# Patient Record
Sex: FEMALE | Race: WHITE | Employment: FULL TIME | URBAN - METROPOLITAN AREA
[De-identification: names, ages, dates, MRNs, and addresses within clinical notes are randomized per-mention and may not be internally consistent; named-entity substitution may affect disease eponyms.]

---

## 2017-03-08 ENCOUNTER — OFFICE VISIT (OUTPATIENT)
Dept: INTERNAL MEDICINE CLINIC | Age: 32
End: 2017-03-08

## 2017-03-08 VITALS
TEMPERATURE: 98.5 F | HEIGHT: 64 IN | DIASTOLIC BLOOD PRESSURE: 78 MMHG | BODY MASS INDEX: 50.02 KG/M2 | SYSTOLIC BLOOD PRESSURE: 124 MMHG | HEART RATE: 80 BPM | WEIGHT: 293 LBS

## 2017-03-08 DIAGNOSIS — Z01.419 ENCOUNTER FOR WELL WOMAN EXAM WITH ROUTINE GYNECOLOGICAL EXAM: ICD-10-CM

## 2017-03-08 DIAGNOSIS — I10 ESSENTIAL HYPERTENSION: Primary | ICD-10-CM

## 2017-03-08 LAB
ANION GAP SERPL CALCULATED.3IONS-SCNC: 18 MMOL/L (ref 3–16)
BUN BLDV-MCNC: 13 MG/DL (ref 7–20)
CALCIUM SERPL-MCNC: 9 MG/DL (ref 8.3–10.6)
CHLORIDE BLD-SCNC: 101 MMOL/L (ref 99–110)
CO2: 20 MMOL/L (ref 21–32)
CREAT SERPL-MCNC: 0.8 MG/DL (ref 0.6–1.1)
GFR AFRICAN AMERICAN: >60
GFR NON-AFRICAN AMERICAN: >60
GLUCOSE BLD-MCNC: 108 MG/DL (ref 70–99)
POTASSIUM SERPL-SCNC: 4.5 MMOL/L (ref 3.5–5.1)
SODIUM BLD-SCNC: 139 MMOL/L (ref 136–145)

## 2017-03-08 PROCEDURE — 99213 OFFICE O/P EST LOW 20 MIN: CPT | Performed by: NURSE PRACTITIONER

## 2017-05-25 RX ORDER — ERGOCALCIFEROL 1.25 MG/1
CAPSULE ORAL
Qty: 12 CAPSULE | Refills: 1 | Status: SHIPPED | OUTPATIENT
Start: 2017-05-25 | End: 2017-11-20 | Stop reason: SDUPTHER

## 2017-05-30 ENCOUNTER — OFFICE VISIT (OUTPATIENT)
Dept: INTERNAL MEDICINE CLINIC | Age: 32
End: 2017-05-30

## 2017-05-30 VITALS
HEART RATE: 91 BPM | WEIGHT: 293 LBS | OXYGEN SATURATION: 97 % | DIASTOLIC BLOOD PRESSURE: 88 MMHG | BODY MASS INDEX: 51.94 KG/M2 | TEMPERATURE: 99.1 F | SYSTOLIC BLOOD PRESSURE: 134 MMHG

## 2017-05-30 DIAGNOSIS — J20.9 ACUTE BRONCHITIS, UNSPECIFIED ORGANISM: Primary | ICD-10-CM

## 2017-05-30 PROCEDURE — 99213 OFFICE O/P EST LOW 20 MIN: CPT | Performed by: NURSE PRACTITIONER

## 2017-05-30 RX ORDER — BENZONATATE 200 MG/1
200 CAPSULE ORAL 3 TIMES DAILY PRN
Qty: 21 CAPSULE | Refills: 0 | Status: SHIPPED | OUTPATIENT
Start: 2017-05-30 | End: 2017-06-06

## 2017-05-30 RX ORDER — AZITHROMYCIN 250 MG/1
TABLET, FILM COATED ORAL
Qty: 6 TABLET | Refills: 0 | Status: SHIPPED | OUTPATIENT
Start: 2017-05-30 | End: 2017-09-12 | Stop reason: ALTCHOICE

## 2017-05-30 ASSESSMENT — ENCOUNTER SYMPTOMS
COUGH: 1
SORE THROAT: 1

## 2017-06-12 ENCOUNTER — OFFICE VISIT (OUTPATIENT)
Dept: GYNECOLOGY | Age: 32
End: 2017-06-12

## 2017-06-12 VITALS
WEIGHT: 293 LBS | HEART RATE: 73 BPM | DIASTOLIC BLOOD PRESSURE: 83 MMHG | BODY MASS INDEX: 50.02 KG/M2 | RESPIRATION RATE: 17 BRPM | HEIGHT: 64 IN | SYSTOLIC BLOOD PRESSURE: 138 MMHG

## 2017-06-12 DIAGNOSIS — N92.6 IRREGULAR MENSES: ICD-10-CM

## 2017-06-12 DIAGNOSIS — Z01.419 WELL WOMAN EXAM WITH ROUTINE GYNECOLOGICAL EXAM: Primary | ICD-10-CM

## 2017-06-12 PROCEDURE — 99385 PREV VISIT NEW AGE 18-39: CPT | Performed by: OBSTETRICS & GYNECOLOGY

## 2017-06-14 LAB
HPV COMMENT: NORMAL
HPV TYPE 16: NOT DETECTED
HPV TYPE 18: NOT DETECTED
HPVOH (OTHER TYPES): NOT DETECTED

## 2017-06-14 ASSESSMENT — ENCOUNTER SYMPTOMS
RESPIRATORY NEGATIVE: 1
GASTROINTESTINAL NEGATIVE: 1
EYES NEGATIVE: 1

## 2017-07-14 DIAGNOSIS — I10 ESSENTIAL HYPERTENSION: ICD-10-CM

## 2017-07-14 RX ORDER — CHLORTHALIDONE 25 MG/1
TABLET ORAL
Qty: 30 TABLET | Refills: 2 | Status: SHIPPED | OUTPATIENT
Start: 2017-07-14 | End: 2017-10-26 | Stop reason: SDUPTHER

## 2017-07-14 RX ORDER — LISINOPRIL 40 MG/1
TABLET ORAL
Qty: 30 TABLET | Refills: 2 | Status: SHIPPED | OUTPATIENT
Start: 2017-07-14 | End: 2017-10-26 | Stop reason: SDUPTHER

## 2017-09-12 ENCOUNTER — OFFICE VISIT (OUTPATIENT)
Dept: INTERNAL MEDICINE CLINIC | Age: 32
End: 2017-09-12

## 2017-09-12 VITALS
TEMPERATURE: 98.9 F | SYSTOLIC BLOOD PRESSURE: 130 MMHG | WEIGHT: 293 LBS | BODY MASS INDEX: 52.87 KG/M2 | HEART RATE: 97 BPM | OXYGEN SATURATION: 97 % | DIASTOLIC BLOOD PRESSURE: 80 MMHG

## 2017-09-12 DIAGNOSIS — I10 ESSENTIAL HYPERTENSION: Primary | ICD-10-CM

## 2017-09-12 LAB
ANION GAP SERPL CALCULATED.3IONS-SCNC: 19 MMOL/L (ref 3–16)
BUN BLDV-MCNC: 18 MG/DL (ref 7–20)
CALCIUM SERPL-MCNC: 8.8 MG/DL (ref 8.3–10.6)
CHLORIDE BLD-SCNC: 96 MMOL/L (ref 99–110)
CO2: 19 MMOL/L (ref 21–32)
CREAT SERPL-MCNC: 0.7 MG/DL (ref 0.6–1.1)
GFR AFRICAN AMERICAN: >60
GFR NON-AFRICAN AMERICAN: >60
GLUCOSE BLD-MCNC: 107 MG/DL (ref 70–99)
POTASSIUM SERPL-SCNC: 4.4 MMOL/L (ref 3.5–5.1)
SODIUM BLD-SCNC: 134 MMOL/L (ref 136–145)

## 2017-09-12 PROCEDURE — 99213 OFFICE O/P EST LOW 20 MIN: CPT | Performed by: NURSE PRACTITIONER

## 2017-10-12 DIAGNOSIS — E87.1 HYPONATREMIA: Primary | ICD-10-CM

## 2017-10-12 LAB
ANION GAP SERPL CALCULATED.3IONS-SCNC: 17 MMOL/L (ref 3–16)
BUN BLDV-MCNC: 16 MG/DL (ref 7–20)
CALCIUM SERPL-MCNC: 8.9 MG/DL (ref 8.3–10.6)
CHLORIDE BLD-SCNC: 97 MMOL/L (ref 99–110)
CO2: 23 MMOL/L (ref 21–32)
CREAT SERPL-MCNC: 0.8 MG/DL (ref 0.6–1.1)
GFR AFRICAN AMERICAN: >60
GFR NON-AFRICAN AMERICAN: >60
GLUCOSE BLD-MCNC: 95 MG/DL (ref 70–99)
POTASSIUM SERPL-SCNC: 4.2 MMOL/L (ref 3.5–5.1)
SODIUM BLD-SCNC: 137 MMOL/L (ref 136–145)

## 2017-10-26 DIAGNOSIS — I10 ESSENTIAL HYPERTENSION: ICD-10-CM

## 2017-10-26 RX ORDER — CHLORTHALIDONE 25 MG/1
TABLET ORAL
Qty: 30 TABLET | Refills: 1 | Status: SHIPPED | OUTPATIENT
Start: 2017-10-26 | End: 2018-01-06 | Stop reason: SDUPTHER

## 2017-10-26 RX ORDER — LISINOPRIL 40 MG/1
TABLET ORAL
Qty: 30 TABLET | Refills: 1 | Status: SHIPPED | OUTPATIENT
Start: 2017-10-26 | End: 2018-01-06 | Stop reason: SDUPTHER

## 2017-11-21 RX ORDER — ERGOCALCIFEROL 1.25 MG/1
CAPSULE ORAL
Qty: 12 CAPSULE | Refills: 0 | Status: SHIPPED | OUTPATIENT
Start: 2017-11-21 | End: 2018-03-07 | Stop reason: SDUPTHER

## 2017-12-15 ENCOUNTER — OFFICE VISIT (OUTPATIENT)
Dept: ORTHOPEDIC SURGERY | Age: 32
End: 2017-12-15

## 2017-12-15 VITALS
DIASTOLIC BLOOD PRESSURE: 81 MMHG | HEIGHT: 64 IN | BODY MASS INDEX: 50.02 KG/M2 | SYSTOLIC BLOOD PRESSURE: 139 MMHG | WEIGHT: 293 LBS | HEART RATE: 87 BPM

## 2017-12-15 DIAGNOSIS — M25.562 LEFT KNEE PAIN, UNSPECIFIED CHRONICITY: Primary | ICD-10-CM

## 2017-12-15 PROCEDURE — 99214 OFFICE O/P EST MOD 30 MIN: CPT | Performed by: ORTHOPAEDIC SURGERY

## 2017-12-15 PROCEDURE — 73562 X-RAY EXAM OF KNEE 3: CPT | Performed by: ORTHOPAEDIC SURGERY

## 2017-12-15 PROCEDURE — 20610 DRAIN/INJ JOINT/BURSA W/O US: CPT | Performed by: ORTHOPAEDIC SURGERY

## 2017-12-15 NOTE — PROGRESS NOTES
[facility]  History and Physical  Knee Pain    Date:  12/15/2017    Name:  Miladis Vickers  Address:  [facility] [address]    :  1985      Age:   27 y.o.    SSN:  [id number]      Medical Record Number:  [id number]    Chief Complaint:  Knee Pain (new patient, left knee pain for about 2 weeks, reports she did start working out more 3 days a week, states she did not work out this week and pain is not better)      HPI:   Miladis Vickers is a healthy and well appearing 27y.o. year old female who presents to our office today complaining of  left knee pain. She is a previous patient of Dr. Nalini Ward, who performed bilateral patellar realignment surgeries on her greater than 1 decade ago. Her right knee is doing excellent. She did have screw removal on this knee several years ago. She did have some left knee symptoms in  that they chanted to treat with physical therapy. She had a cortisone injection in the past that she thinks got her about a month of pain relief. She also had the viscous supplementation injection that she does not note any relief from. Her symptoms are worse when she descends stairs. Pain Assessment  Location of Pain: Knee  Location Modifiers: Left  Severity of Pain: 7  Quality of Pain: Sharp, Throbbing  Duration of Pain: Persistent  Frequency of Pain: Constant  Date Pain First Started:  (increased work out)  Aggravating Factors: Stairs  Limiting Behavior: Yes  Relieving Factors: Ice, Nsaids (ibuprofen)  Result of Injury: Yes  Work-Related Injury: No  Are there other pain locations you wish to document?: No    Review of Systems:  A 14 point review of systems available in the scanned medical record, under the media tab, as documented by the patient. The review is negative with the exception of those things mentioned in the HPI and Past Medical History. Past History:  History reviewed.  No pertinent past medical intact  Mouth: Oral mucosa moist. No perioral lesions  Pulm: Respirations unlabored and regular. Heart: Regular rate and rhythm   Skin: warm, well perfused    Left Knee Exam:   Overall alignment is appreciated. Within normal limits. On standing she has mild worsening of her valgus     Gait/Alignment: No use of assistive devices.       Patella tracking: No J sign. She still has a mild valgus deformity with increased Q angle.     Inspection/Skin: Skin is intact without erythema or ecchymosis. No significant swelling. No deformity.      Effusion; none.      Palpation: She is tender about the lateral facet of patella. She has significant patellofemoral crepitus with pain exacerbated by she is tender on the lateral facet of her patella. She has significant crepitance that reproduces pain with compression to her arc of range of motion.     Range of Motion: From 0-110° limited by her body habitus degrees of flexion without pain. Internal and external rotation of the hip are maintained without pain or deficit.      Strength: 5/5 strength of the quadriceps and hamstrings.      Ligamentous Stability: Stable to valgus and varus stress testing at 0° and 30°. Marj's does not reproduce pain. Lachman's has a solid endpoint.     Neurologic and vascular: Intact sensation to light touch in all 5 digits. 2+ palpable pedal pulses.           Comparison right Knee Exam:   Overall alignment is appreciated. Within normal limits.      Gait/Alignment: No use of assistive devices.       Patella tracking: No J sign.      Inspection/Skin: Skin is intact without erythema or ecchymosis. No significant swelling. No deformity.      Effusion; none.      Palpation: Non-tender to the medial or lateral joint line. No fullness in the popliteal fossa. No pain with patellar grind testing. Non-tender to the medial or lateral patellar facets. Non-tender to medial and lateral collateral ligaments. No significant Patellofemoral crepitus.  Calf compartments are soft and non-tender. No signs of DVT.      Range of Motion: From 0-110° limited by her body habitus, flexion without pain. Internal and external rotation of the hip are maintained without pain or deficit.      Strength: 5/5 strength of the quadriceps and hamstrings.      Ligamentous Stability: Stable to valgus and varus stress testing at 0° and 30°. Marj's does not reproduce pain. Lachman's has a solid endpoint.     Neurologic and vascular: Intact sensation to light touch in all 5 digits. 2+ palpable pedal pulses. Laboratory:  No visits with results within 14 Day(s) from this visit. Latest known visit with results is:   Orders Only on 10/12/2017   Component Date Value    Sodium 10/12/2017 137     Potassium 10/12/2017 4.2     Chloride 10/12/2017 97*    CO2 10/12/2017 23     Anion Gap 10/12/2017 17*    Glucose 10/12/2017 95     BUN 10/12/2017 16     CREATININE 10/12/2017 0.8     GFR Non- 10/12/2017 >60     GFR  10/12/2017 >60     Calcium 10/12/2017 8.9       No results found for this or any previous visit (from the past 24 hour(s)). Radiographic:  3 xray of the left  knee taken in our office today reveal no fractures, dislocations, visible tumors, or signs of acute trauma. She has previous evidence of patellar realignment. Her merchant view shows significant maltracking with near complete wear of the lateral patellar facet and eccentric riding in the trochlea. She also has some narrowing in her medial compartment. Assessment:  Patellofemoral and medial compartment arthritis    Plan:   We discussed the pathophysiology, as well as the natural history of this type of disease process with the patient. We reviewed the imaging as well as the pertinent anatomy with handouts. We liked her to begin a more regimented program of anti-inflammatories as well as physical therapy today.   She she was also explained the risks and benefits of cortisone the left knee was prepped with Chlora-prep. The skin and subcutaneous tissues were anesthetized with ethyl chloride spray and a 6 mL solution of 4 mL ropivacaine and  2 ml of 40mg/ml DepoMedrol was injected. The needle was withdrawn and the puncture site sealed with a Band-Aid. The patient tolerated the procedure well. Elicia Massey MD  Board Certified Orthopaedic Surgeon  Fellow, Sports Medicine and Arthroscopic [facility]  Date:    12/15/2017  Attestation:  I was physically present and performed my own examination of this patient and have discussed the case, including pertinent history and exam findings with the fellow. I agree with the documented assessment and plan. Gregory Mcknight.  Miguelangel Siddiqi MD

## 2017-12-15 NOTE — PROGRESS NOTES
Depo medrol NDC [id number]  Lot# [id number]  Exp.4/2020   Knee, left    Ropivacaine    [facility]#:  [id number]  LOT#:  [id number]  Exp Date:  05/2021  Knee, left

## 2017-12-29 ENCOUNTER — HOSPITAL ENCOUNTER (OUTPATIENT)
Dept: PHYSICAL THERAPY | Age: 32
Discharge: HOME | End: 2017-12-31
Admitting: ORTHOPAEDIC SURGERY

## 2017-12-29 NOTE — PLAN OF CARE
two years ago, she started having high amount of pain in her left knee that gradually went away over time. About 3-4 months ago, started working out more consistently again and pain came back aggressively. The pain does not prevent her from doing anything, she just has pain after working out that got to the point that she came in to see Dr. Bouchra Virgen. She reports that she started doing crossfit on average 3x/week. Denies that any certain exercise that caused pain during workouts. Has had some relief in recent weeks after receiving cortisone injection in her knee. (+) clicking/popping/crepitus  (-) burning pain  (-) numbness and tingling  (-) giving way  (-) locking up  (-) spasms  (+) night pain: had to take 3-4 ibuprofen per night to fall asleep a few weeks ago, but no longer since cortisone injection  (+) pain with stairs ascending/descending (++)  (>20min) minutes walking/standing  (-) dislocation/subluxation  (-) pivoting symptoms  (-) Instability  (+) difficulty sleeping  (-) Falls in last year  (-) Difficulty with balance    Imaging: 3 xray of the left  knee taken in our office today reveal no fractures, dislocations, visible tumors, or signs of acute trauma. She has previous evidence of patellar realignment. Her merchant view shows significant maltracking with near complete wear of the lateral patellar facet and eccentric riding in the trochlea. She also has some narrowing in her medial compartment. Relevant Medical History: Bilateral patellar realignment surgeries + hardware removal (R Only)   Functional Disability Index:PT G-Codes  Functional Assessment Tool Used: LEFS  Score: 25% Deficit  Functional Limitation: Mobility: Walking and moving around  Mobility: Walking and Moving Around Current Status (): At least 20 percent but less than 40 percent impaired, limited or restricted  Mobility: Walking and Moving Around Goal Status ():  At least 1 percent but less than 20 percent impaired, n/a    Gait: (include devices/WB status) Goes into hyperextension during stance phase bilaterally; mild trendelenburg bilaterally; calcaneal eversion during mid-stance    Orthopedic Special Tests: see above                       [x] Patient history, allergies, meds reviewed. Medical chart reviewed. See intake form. Review Of Systems (ROS):  [x]Performed Review of systems (Integumentary, CardioPulmonary, Neurological) by intake and observation. Intake form has been scanned into medical record. Patient has been instructed to contact their primary care physician regarding ROS issues if not already being addressed at this time.       Co-morbidities/Complexities (which will affect course of rehabilitation):   []None           Arthritic conditions   []Rheumatoid arthritis (M05.9)  [x]Osteoarthritis (M19.91)   Cardiovascular conditions   [x]Hypertension (I10)  []Hyperlipidemia (E78.5)  []Angina pectoris (I20)  []Atherosclerosis (I70)   Musculoskeletal conditions   []Disc pathology   []Congenital spine pathologies   [x]Prior surgical intervention  []Osteoporosis (M81.8)  []Osteopenia (M85.8)   Endocrine conditions   []Hypothyroid (E03.9)  []Hyperthyroid Gastrointestinal conditions   []Constipation (F33.91)   Metabolic conditions   [x]Morbid obesity (E66.01)  []Diabetes type 1(E10.65) or 2 (E11.65)   []Neuropathy (G60.9)     Pulmonary conditions   []Asthma (J45)  []Coughing   []COPD (J44.9)   Psychological Disorders  []Anxiety (F41.9)  []Depression (F32.9)   []Other:   []Other:          Barriers to/and or personal factors that will affect rehab potential:              []Age  []Sex              []Motivation/Lack of Motivation                        [x]Co-Morbidities              []Cognitive Function, education/learning barriers              []Environmental, home barriers              []profession/work barriers  [x]past PT/medical experience  [x]other:  Justification: Patient has high body weight with underlying cartilage damage to patella which may limit symptom relief without adequate activity modification    Falls Risk Assessment (30 days):   [x] Falls Risk assessed and no intervention required. [] Falls Risk assessed and Patient requires intervention due to being higher risk   TUG score (>12s at risk):     [] Falls education provided, including       G-Codes:  PT G-Codes  Functional Assessment Tool Used: LEFS  Score: 25% Deficit  Functional Limitation: Mobility: Walking and moving around  Mobility: Walking and Moving Around Current Status (): At least 20 percent but less than 40 percent impaired, limited or restricted  Mobility: Walking and Moving Around Goal Status ():  At least 1 percent but less than 20 percent impaired, limited or restricted    ASSESSMENT:   Functional Impairments:     []Noted lumbar/proximal hip/LE hypomobility   []Decreased LE functional ROM   [x]Decreased core/proximal hip strength and neuromuscular control   [x]Decreased LE functional strength   [x]Reduced balance/proprioceptive control   []other:      Functional Activity Limitations (from functional questionnaire and intake)   []Reduced ability to tolerate prolonged functional positions   [x]Reduced ability or difficulty with changes of positions or transfers between positions   [x]Reduced ability to maintain good posture and demonstrate good body mechanics with sitting, bending, and lifting   [x]Reduced ability to sleep   [] Reduced ability or tolerance with driving and/or computer work   [x]Reduced ability to perform lifting, carrying tasks   [x]Reduced ability to squat   []Reduced ability to forward bend   [x]Reduced ability to ambulate prolonged functional periods/distances/surfaces   [x]Reduced ability to ascend/descend stairs   [x]Reduced ability to run, hop or jump   []other:     Participation Restrictions   []Reduced participation in self care activities   []Reduced participation in home management activities   []Reduced participation

## 2017-12-29 NOTE — FLOWSHEET NOTE
Flexibility L R Comment   Hamstring Mild WNL 90/90   Gastroc NT NT     ITB WNL WNL Malissa   Quad Moderate Moderate Ely's   Piriformis Moderate Mild     Hip Flexor Moderate Moderate Jordan                ROM PROM AROM Overpressure Comment     L R L R L R     Flexion     120 120         Extension     0 +5                                                   Strength L R Comment   Quad 4 5     Hamstring 4 4+     Gastroc NT NT     Hip  flexion 4- 4     Hip abd 4- 4     Hip Ext 4- 4                     Special Test Results/Comment   Meniscal Click Neg   Crepitus Pos   Flexion Test Neg   Valgus Laxity Neg   Varus Laxity Neg   Lachmans Neg   Drop Back Neg   Homans Neg         Girth L R   Mid Patella 58.2 58.0   Suprapatellar 60.4 60.2   5cm above 61.9 66.1   15cm above          TEST INITIAL     12/28/17 FOLLOW  UP GOAL   SINGLE LEG SQUAT TEST L: FAIR (-)     R: FAIR   NO KNEE VALGUS, MEDIAL/LATERAL MOVEMENT, OR PELVIC TILT  GOOD, FAIR, POOR   SLS EC L: 26.18:     R: 8.38\"                  >30 seconds         Reflexes/Sensation:               [x]Dermatomes/Myotomes intact               [x]Reflexes equal and normal bilaterally              []Other:     Joint mobility:                [x]Normal               []Hypo              []Hyper     Palpation: Mild tenderness over medial and lateral joint lines and lateral patella on L knee     Functional Mobility/Transfers: Independent     Posture: Hyperextension in both knees     Bandages/Dressings/Incisions: n/a     Gait: (include devices/WB status) Goes into hyperextension during stance phase bilaterally; mild trendelenburg bilaterally; calcaneal eversion during mid-stance     Orthopedic Special Tests: see above    RESTRICTIONS/PRECAUTIONS: HTN (Controlled), OA (knees), PF Protection Program    Exercises/Interventions:  Exercise/Equipment Resistance/Repetitions Other comments   Stretching       Hamstring 3 x 30\"    Hip Flexor 3 x 30\"    ITB     Figure 4 3 x 30\" Chair   [address] [facility] Inclined Calf     Towel Pull             ROM       Passive     Active     Weight Shift     Hang Weights     Sheet Pulls     Recumbent Bike     ERMI     Ankle Pumps             Patellar Glides       Medial       Superior       Inferior               Isometrics       Quad sets      Add sets              SLR       Supine     Prone     Abduction     Adducton     SLR+  3 x 15\"             Glutes       Bridges     Supine Clams     S/L Clams 3 x 10 Blue   Side Stepping       Monster walks               CKC       Weight Shift     Calf raises     Wall sits     Step ups       Squatting     CC TKE     SL DL               PRE       Extension     Flexion     Leg Press       Cable Column               Balance       Tilt board       SLS      Biodex balance               Other       Bike       Treadmill       Gait Training          Manual Interventions             Patient Education:   12/29/17: Patient educated about PT diagnosis, prognosis, and plan of care; educated on role of physical therapy; educated on HEP; educated on anatomy of PF joint; on avoiding activities such as deep squats, jumping, running, and kneeling. HEP: Patient instructed on HEP on this date with handout provided and all questions answered. Patient was instructed to contact PT with any complications or questions about HEP moving forward. Patient verbally stated she understood. Updated 12/29/17      Therapeutic Exercise and NMR EXR  [x] (93909) Provided verbal/tactile cueing for activities related to strengthening, flexibility, endurance, ROM for improvements in LE, proximal hip, and core control with self care, mobility, lifting, ambulation.  [] (23048) Provided verbal/tactile cueing for activities related to improving balance, coordination, kinesthetic sense, posture, motor skill, proprioception  to assist with LE, proximal hip, and core control in self care, mobility, lifting, ambulation and eccentric single leg control.      NMR and Therapeutic Activities:    [x] (03160 or 33117) Provided verbal/tactile cueing for activities related to improving balance, coordination, kinesthetic sense, posture, motor skill, proprioception and motor activation to allow for proper function of core, proximal hip and LE with self care and ADLs  [] (98528) Gait Re-education- Provided training and instruction to the patient for proper LE, core and proximal hip recruitment and positioning and eccentric body weight control with ambulation re-education including up and down stairs     Home Exercise Program:    [x] (73787) Reviewed/Progressed HEP activities related to strengthening, flexibility, endurance, ROM of core, proximal hip and LE for functional self-care, mobility, lifting and ambulation/stair navigation   [] (46704)Reviewed/Progressed HEP activities related to improving balance, coordination, kinesthetic sense, posture, motor skill, proprioception of core, proximal hip and LE for self care, mobility, lifting, and ambulation/stair navigation      Manual Treatments:  PROM / STM / Oscillations-Mobs:  G-I, II, III, IV (PA's, Inf., Post.)  [] (10731) Provided manual therapy to mobilize LE, proximal hip and/or LS spine soft tissue/joints for the purpose of modulating pain, promoting relaxation,  increasing ROM, reducing/eliminating soft tissue swelling/inflammation/restriction, improving soft tissue extensibility and allowing for proper ROM for normal function with self care, mobility, lifting and ambulation.      Modalities:  Declined    Charges:  Timed Code Treatment Minutes: 30   Total Treatment Minutes: 55       [x] EVAL (LOW) 55302 (typically 20 minutes face-to-face)  [] EVAL (MOD) 12197 (typically 30 minutes face-to-face)  [] EVAL (HIGH) 12258 (typically 45 minutes face-to-face)  [] RE-EVAL   [x] PT(43375) x  1   [] IONTO  [] NMR (38909) x      [] VASO  [] Manual (52252) x       [] Other:  [x] TA x  1    [] Mech Traction (27619)  [] ES(attended) (20206)      [] ES (un) (44656):     GOALS:  Patient stated goal: Increase strength, reduce pain     Therapist goals for Patient:   Short Term Goals: To be achieved in: 2 weeks  1. Independent in HEP and progression per patient tolerance, in order to prevent re-injury. 2. Patient will have a decrease in pain at rest to 0/10 to facilitate improvement in movement, function, and ADLs as indicated by Functional Deficits.     Long Term Goals: To be achieved in: 8 weeks  1. Disability index score of 25% or less for the LEFS to assist with reaching prior level of function. 2. Patient will demonstrate increased flexibility in R LE to WNL for hamstrings and Mild for hip flexor, quad, and piriformis muscles to allow for proper joint functioning as indicated by patients Functional Deficits. 3. Patient will demonstrate an increase in Strength to 4+/5 or greater in B LE to allow for proper functional mobility as indicated by patients Functional Deficits. 4. Patient will return to home gym program without increased symptoms or restriction. (patient specific functional goal)   5. Patient will be able to ascend and descend stairs without pain in knee. Progression Towards Functional goals:  [] Patient is progressing as expected towards functional goals listed. [] Progression is slowed due to complexities listed. [] Progression has been slowed due to co-morbidities.   [x] Plan just implemented, too soon to assess goals progression  [] Other:     ASSESSMENT:  See eval    Treatment/Activity Tolerance:  [x] Patient tolerated treatment well [] Patient limited by fatique  [] Patient limited by pain  [] Patient limited by other medical complications  [] Other:     Prognosis: [] Good [x] Fair  [] Poor    Patient Requires Follow-up: [x] Yes  [] No    PLAN: See eval  [] Continue per plan of care [] Alter current plan (see comments)  [x] Plan of care initiated [] Hold pending MD visit [] Discharge    Add wall sits, bridges, quad stretch, slant board, calf raises, 4 way hip    Electronically signed by: Kelly Dexter PT, DPT

## 2018-01-01 ENCOUNTER — HOSPITAL ENCOUNTER (OUTPATIENT)
Dept: PHYSICAL THERAPY | Age: 33
Discharge: HOME | End: 2018-01-31
Attending: ORTHOPAEDIC SURGERY | Admitting: ORTHOPAEDIC SURGERY

## 2018-01-05 ENCOUNTER — HOSPITAL ENCOUNTER (OUTPATIENT)
Dept: PHYSICAL THERAPY | Age: 33
Discharge: HOME | End: 2018-01-05
Admitting: ORTHOPAEDIC SURGERY

## 2018-01-05 NOTE — FLOWSHEET NOTE
Moderate Moderate Ely's   Piriformis Moderate Mild     Hip Flexor Moderate Moderate Jordan                ROM PROM AROM Overpressure Comment     L R L R L R     Flexion     120 120         Extension     0 +5                                                   Strength L R Comment   Quad 4 5     Hamstring 4 4+     Gastroc NT NT     Hip  flexion 4- 4     Hip abd 4- 4     Hip Ext 4- 4                     Special Test Results/Comment   Meniscal Click Neg   Crepitus Pos   Flexion Test Neg   Valgus Laxity Neg   Varus Laxity Neg   Lachmans Neg   Drop Back Neg   Homans Neg         Girth L R   Mid Patella 58.2 58.0   Suprapatellar 60.4 60.2   5cm above 61.9 66.1   15cm above          TEST INITIAL     12/28/17 FOLLOW  UP GOAL   SINGLE LEG SQUAT TEST L: FAIR (-)     R: FAIR   NO KNEE VALGUS, MEDIAL/LATERAL MOVEMENT, OR PELVIC TILT  GOOD, FAIR, POOR   SLS EC L: 26.18:     R: 8.38\"                  >30 seconds         Reflexes/Sensation:               [x]Dermatomes/Myotomes intact               [x]Reflexes equal and normal bilaterally              []Other:     Joint mobility:                [x]Normal               []Hypo              []Hyper     Palpation: Mild tenderness over medial and lateral joint lines and lateral patella on L knee     Functional Mobility/Transfers: Independent     Posture: Hyperextension in both knees     Bandages/Dressings/Incisions: n/a     Gait: (include devices/WB status) Goes into hyperextension during stance phase bilaterally; mild trendelenburg bilaterally; calcaneal eversion during mid-stance     Orthopedic Special Tests: see above    RESTRICTIONS/PRECAUTIONS: HTN (Controlled), OA (knees), PF Protection Program    Exercises/Interventions:  Exercise/Equipment Resistance/Repetitions Other comments   Stretching       Hamstring 3 x 30\"    Hip Flexor 3 x 30\" Half kneel on Airex   ITB     Figure 4 3 x 30\" Chair   Quad 3 x 30\"    Inclined Calf 3 x 30\"    Towel Pull     Upright bike 5' warm up Level 3           ROM       Passive     Active     Weight Shift     Hang Weights     Sheet Pulls     Recumbent Bike     ERMI     Ankle Pumps             Patellar Glides       Medial       Superior       Inferior               Isometrics       Quad sets      Add sets              SLR       Supine     Prone     Abduction     Adducton     SLR+  3 x Fatigue 30\" each 1/5/18           Glutes       Bridges 3 x 10 Blue   Supine Clams     S/L Clams 3 x 10 Blue   Side Stepping       Monster walks               CKC       Weight Shift     Calf raises 3 x 10 10# DBs   Wall sits 2 x fatigue    Step ups       Squatting     CC TKE     SL DL               PRE       Extension     Flexion     Leg Press       Cable Column  3 x 10 each Abd, Ext 4.5pl           Balance       Tilt board       SLS      Biodex balance               Other       Bike       Treadmill       Gait Training          Manual Interventions             Patient Education:   12/29/17: Patient educated about PT diagnosis, prognosis, and plan of care; educated on role of physical therapy; educated on HEP; educated on anatomy of PF joint; on avoiding activities such as deep squats, jumping, running, and kneeling. HEP: Patient instructed on HEP on this date with handout provided and all questions answered. Patient was instructed to contact PT with any complications or questions about HEP moving forward. Patient verbally stated she understood. Updated 1/5/18    Therapeutic Exercise and NMR EXR  [x] (64254) Provided verbal/tactile cueing for activities related to strengthening, flexibility, endurance, ROM for improvements in LE, proximal hip, and core control with self care, mobility, lifting, ambulation.  [] (18325) Provided verbal/tactile cueing for activities related to improving balance, coordination, kinesthetic sense, posture, motor skill, proprioception  to assist with LE, proximal hip, and core control in self care, mobility, lifting, ambulation and eccentric single leg control. (61438)      [] ES (un) (38927):     GOALS:  Patient stated goal: Increase strength, reduce pain     Therapist goals for Patient:   Short Term Goals: To be achieved in: 2 weeks  1. Independent in HEP and progression per patient tolerance, in order to prevent re-injury. 2. Patient will have a decrease in pain at rest to 0/10 to facilitate improvement in movement, function, and ADLs as indicated by Functional Deficits.     Long Term Goals: To be achieved in: 8 weeks  1. Disability index score of 25% or less for the LEFS to assist with reaching prior level of function. 2. Patient will demonstrate increased flexibility in R LE to WNL for hamstrings and Mild for hip flexor, quad, and piriformis muscles to allow for proper joint functioning as indicated by patients Functional Deficits. 3. Patient will demonstrate an increase in Strength to 4+/5 or greater in B LE to allow for proper functional mobility as indicated by patients Functional Deficits. 4. Patient will return to home gym program without increased symptoms or restriction. (patient specific functional goal)   5. Patient will be able to ascend and descend stairs without pain in knee. Progression Towards Functional goals:  [] Patient is progressing as expected towards functional goals listed. [] Progression is slowed due to complexities listed. [] Progression has been slowed due to co-morbidities. [x] Plan just implemented, too soon to assess goals progression  [] Other:     ASSESSMENT:  See eval    Treatment/Activity Tolerance:  [x] Patient tolerated treatment well [] Patient limited by fatique  [] Patient limited by pain  [] Patient limited by other medical complications  [x] Other: Good tolerance today to therapy. Demonstrated good understanding to HEP as she could independently set herself up for these exercises.  Was able to increase her hold times for SLR+ to 30 seconds this visit, doubling her time from last week demonstrating improved quad

## 2018-01-12 ENCOUNTER — HOSPITAL ENCOUNTER (OUTPATIENT)
Dept: PHYSICAL THERAPY | Age: 33
Discharge: HOME | End: 2018-01-12
Admitting: ORTHOPAEDIC SURGERY

## 2018-01-12 NOTE — FLOWSHEET NOTE
[facility]  Orthopaedics and Sports Rehabilitation, [facility]    Physical Therapy Daily Treatment Note  Date:  2018    Patient Name:  Noni Call    :  1985  MRN: [id number]  Restrictions/Precautions:    Medical/Treatment Diagnosis Information:  Diagnosis: M25.562 Left Knee Pain  · Treatment Diagnosis: Decreased strength, Decreased flexibility, increased pain, impaired balance  Insurance/Certification information:  PT Insurance Information: PT BENEFITS 2017 FACILITY/ [employer]/ EFFECTIVE 2017/ ACTIVE/ DED DOES NOT APPLY/ COPAY 60/ PAYS 100%/ OOP 6500/ 60 VPCY COMB/ 0 AUTH/ JAM/ REF# [id number]/ 17 PAG  Physician Information:  Referring Practitioner: Jatin Copping of care signed (Y/N): Pending    Date of Patient follow up with Physician: 18    G-Code (if applicable):      Date G-Code Applied:  17       Progress Note: [x]  Yes  []  No  Next due by: Visit #10       Latex Allergy:  [x]NO      []YES  Preferred Language for Healthcare:   [x]English       []other:    Visit # Insurance Allowable   2 ()   () 60 VPCY  *Needs Updated*     Pain level:  3-4/10 rest, 6/10 worst in past week    SUBJECTIVE:  Patient doing about the same today. Feels her knee symptoms are unchanged. Notes her most soreness in knee at night time and associated stiffness in the morning. Is doing her HEP daily without any problems but does feel fatigued after. Imaging: 3 xray of the left  knee taken in our office today reveal no fractures, dislocations, visible tumors, or signs of acute trauma.  She has previous evidence of patellar realignment.  Her merchant view shows significant maltracking with near complete wear of the lateral patellar facet and eccentric riding in the trochlea.  She also has some narrowing in her medial compartment.     OBJECTIVE:       Flexibility L R Comment   Hamstring Mild WNL 90/90   Gastroc NT NT     ITB WNL WNL Malissa   Quad Moderate Moderate Ely's Other:  [x] TA x  2    [] [facility] T[facility] (78300)  [] ES(attended) (89924)      [] ES (un) (02289):     GOALS:  Patient stated goal: Increase strength, reduce pain     Therapist goals for Patient:   Short Term Goals: To be achieved in: 2 weeks  1. Independent in HEP and progression per patient tolerance, in order to prevent re-injury. 2. Patient will have a decrease in pain at rest to 0/10 to facilitate improvement in movement, function, and ADLs as indicated by Functional Deficits.     Long Term Goals: To be achieved in: 8 weeks  1. Disability index score of 25% or less for the LEFS to assist with reaching prior level of function. 2. Patient will demonstrate increased flexibility in R LE to WNL for hamstrings and Mild for hip flexor, quad, and piriformis muscles to allow for proper joint functioning as indicated by patients Functional Deficits. 3. Patient will demonstrate an increase in Strength to 4+/5 or greater in B LE to allow for proper functional mobility as indicated by patients Functional Deficits. 4. Patient will return to home gym program without increased symptoms or restriction. (patient specific functional goal)   5. Patient will be able to ascend and descend stairs without pain in knee. Progression Towards Functional goals:  [] Patient is progressing as expected towards functional goals listed. [] Progression is slowed due to complexities listed. [] Progression has been slowed due to co-morbidities. [x] Plan just implemented, too soon to assess goals progression  [] Other:     ASSESSMENT:  See eval    Treatment/Activity Tolerance:  [x] Patient tolerated treatment well [] Patient limited by fatique  [] Patient limited by pain  [] Patient limited by other medical complications  [x] Other: Good tolerance today to therapy. Still fatigues quickly during SLR+ and wall sits demonstrating poor quad endurance.  Patient still has symptoms in the evening/night from fatigue of work so PT changed HEP

## 2018-01-17 ENCOUNTER — OFFICE VISIT (OUTPATIENT)
Dept: ORTHOPEDIC SURGERY | Age: 33
End: 2018-01-17

## 2018-01-17 VITALS
HEIGHT: 64 IN | BODY MASS INDEX: 50.02 KG/M2 | HEART RATE: 96 BPM | SYSTOLIC BLOOD PRESSURE: 120 MMHG | WEIGHT: 293 LBS | DIASTOLIC BLOOD PRESSURE: 89 MMHG

## 2018-01-17 DIAGNOSIS — M22.42 PATELLA, CHONDROMALACIA, LEFT: ICD-10-CM

## 2018-01-17 DIAGNOSIS — M17.12 PRIMARY OSTEOARTHRITIS OF LEFT KNEE: ICD-10-CM

## 2018-01-17 DIAGNOSIS — M25.562 LEFT KNEE PAIN, UNSPECIFIED CHRONICITY: Primary | ICD-10-CM

## 2018-01-17 PROCEDURE — 99213 OFFICE O/P EST LOW 20 MIN: CPT | Performed by: ORTHOPAEDIC SURGERY

## 2018-01-17 RX ORDER — DICLOFENAC SODIUM 75 MG/1
75 TABLET, DELAYED RELEASE ORAL 2 TIMES DAILY
Qty: 60 TABLET | Refills: 2 | Status: SHIPPED | OUTPATIENT
Start: 2018-01-17 | End: 2018-03-07 | Stop reason: ALTCHOICE

## 2018-01-17 NOTE — PROGRESS NOTES
erythema or effusion. She has full active range of motion. There is no patellofemoral crepitus with active range of motion or with superior-inferior glide. She really has no medial or lateral joint line tenderness today. There is no medial or lateral retinacular tenderness. Anterior posterior crucially was her intact. Medial and lateral collateral ligaments are intact. She has negative straight leg raise. Sensibility left lower external is normal.  There is no swelling foot or ankle. Her quadriceps contracture appears to be improving. Impression left knee osteoarthritis. Plan: She will continue excess programs. She just wishes to come back if needed.

## 2018-01-31 ENCOUNTER — TELEPHONE (OUTPATIENT)
Dept: BARIATRICS/WEIGHT MGMT | Age: 33
End: 2018-01-31

## 2018-02-01 ENCOUNTER — HOSPITAL ENCOUNTER (OUTPATIENT)
Dept: PHYSICAL THERAPY | Age: 33
Discharge: HOME | End: 2018-02-28
Attending: ORTHOPAEDIC SURGERY | Admitting: ORTHOPAEDIC SURGERY

## 2018-02-07 ENCOUNTER — OFFICE VISIT (OUTPATIENT)
Dept: BARIATRICS/WEIGHT MGMT | Age: 33
End: 2018-02-07

## 2018-02-07 VITALS
HEIGHT: 64 IN | DIASTOLIC BLOOD PRESSURE: 84 MMHG | HEART RATE: 93 BPM | WEIGHT: 293 LBS | BODY MASS INDEX: 50.02 KG/M2 | SYSTOLIC BLOOD PRESSURE: 120 MMHG

## 2018-02-07 DIAGNOSIS — M17.12 PRIMARY OSTEOARTHRITIS OF LEFT KNEE: ICD-10-CM

## 2018-02-07 DIAGNOSIS — E66.01 MORBID OBESITY WITH BMI OF 50.0-59.9, ADULT (HCC): Primary | ICD-10-CM

## 2018-02-07 DIAGNOSIS — I10 ESSENTIAL HYPERTENSION: ICD-10-CM

## 2018-02-07 PROCEDURE — 99204 OFFICE O/P NEW MOD 45 MIN: CPT | Performed by: SURGERY

## 2018-02-07 NOTE — PROGRESS NOTES
[facility]) Physicians   General & Laparoscopic Surgery  [employer]      HPI:    Dave Capellan is a very pleasant 27 y.o. obese female ,   Body mass index is 54.96 kg/m². And multiple medical problems who is presenting for weight loss surgery evaluation and consultation by Dr. Jocelyn Vargas. Patient has been struggling for several years now with obesity. Patient feels the weight is an obstacle to achieve and perform things in daily living as well risk on health. Tries to diet, and exercise but can't keep the weight off. Patient  is very determined to lose weight and be healthy, and is interested in surgical weight loss to achieve this goal.    Otherwise patient denies any nausea, vomiting, fevers, chills, shortness of breath, chest pain, constipation or urinary symptoms. Pain Assessment   Denies any abdominal pain     Past Medical History:   Diagnosis Date    Arthritis     left knee    Hypertension      Past Surgical History:   Procedure Laterality Date    KNEE SURGERY Bilateral     patella realignment-2 on right knee and one on left     Family History   Problem Relation Age of Onset    High Blood Pressure Mother     Other Mother      migraine headaches    Cancer Father      lymphoma    High Blood Pressure Sister     Other Sister      migraine headaches    Arthritis Maternal Grandmother     Arthritis Maternal Grandfather     Cancer Paternal Grandmother      esophageal    Arthritis Paternal Grandmother     Arthritis Paternal Grandfather      Social History   Substance Use Topics    Smoking status: Never Smoker    Smokeless tobacco: Never Used    Alcohol use 0.0 oz/week      Comment: once a week     I counseled the patient on the importance of not smoking and risks of ETOH.    No Known Allergies  Vitals:    02/07/18 1259   BP: 120/84   Pulse: 93   Weight: (!) 320 lb 3.2 oz (145.2 kg)   Height: 5' 4\" (1.626 m)       Body mass index is 54.96 kg/m². Current Outpatient Prescriptions:     diclofenac (VOLTAREN) 75 MG EC tablet, Take 1 tablet by mouth 2 times daily 1 tablet by mouth twice a day, Disp: 60 tablet, Rfl: 2    lisinopril (PRINIVIL;ZESTRIL) 40 MG tablet, TAKE ONE TABLET BY MOUTH DAILY, Disp: 30 tablet, Rfl: 3    chlorthalidone (HYGROTON) 25 MG tablet, TAKE ONE TABLET BY MOUTH DAILY, Disp: 30 tablet, Rfl: 3    vitamin D (ERGOCALCIFEROL) 92982 units CAPS capsule, TAKE ONE CAPSULE BY MOUTH ONCE WEEKLY, Disp: 12 capsule, Rfl: 0      Review of Systems - History obtained from the patient  General ROS: negative  Psychological ROS: negative  Ophthalmic ROS: negative  Neurological ROS: negative  ENT ROS: negative  Allergy and Immunology ROS: negative  Hematological and Lymphatic ROS: negative  Endocrine ROS: negative  Respiratory ROS: negative  Cardiovascular ROS: negative  Gastrointestinal ROS:negative  Genito-Urinary ROS: negative  Musculoskeletal ROS: negative   Skin ROS: negative    Physical Exam   Constitutional: Patient is oriented to person, place, and time. Vital signs are normal. Patient  appears well-developed and well-nourished. Patient  is active and cooperative. Non-toxic appearance. No distress. HENT:   Head: Normocephalic and atraumatic. Head is without laceration. Right Ear: External ear normal. No lacerations. No drainage, swelling or tenderness. Left Ear: External ear normal. No lacerations. No drainage, swelling or tenderness. Nose: Nose normal. No nose lacerations or nasal deformity. Mouth/Throat: Uvula is midline, oropharynx is clear and moist and mucous membranes are normal. No oropharyngeal exudate. Eyes: Conjunctivae, EOM and lids are normal. Pupils are equal, round, and reactive to light. Right eye exhibits no discharge. No foreign body present in the right eye. Left eye exhibits no discharge. No foreign body present in the left eye. No scleral icterus. Neck: Trachea normal and normal range of motion.  Neck motivated to lose weight and being more healthy. We discussed how her weight affects her overall health including:  Patient Active Problem List   Diagnosis    Left knee pain    Acquired varus deformity knee    Effusion of knee joint    Lateral subluxation of patella    Primary osteoarthritis of left knee    Obesity, morbid, BMI 50 or higher ([address]    Essential hypertension      The patient underwent 30 minutes of dietary counseling. I have reviewed, discussed and agree with the dietary plan. Medical weight loss and different surgical options were discussed in details with patient. Jose L East is interested in surgical weight loss. Patient is interested in Laparoscopic Sleeve Gastrectomy, which I believe is an excellent option for the patient. We will proceed with pre-operative work up labs and studies. Will also petition patient's  insurance for approval for this procedure. Patient received dietary handouts and education. Patient advised that its their responsibility to follow up for studies and/or labs ordered today. Also discussed in details the importance of follow up, as well following the recommendations and completing the whole program to improve outcomes when it comes to healthier lifestyle as well weight loss. Patient also advised about risks and benefits being on a strict dietary regimen as well using supplements. Patient agrees and wants to proceed with weight loss planning     Labs ordered. Cardiac Clearance. Pulmonary Clearance. Psych Evaluation. EGD  Support Group. PCP Letter. Weight History   F/U in 4 weeks. Patient advised that its their responsibility to follow up for studies and/or labs ordered today. Pamela Herrera, DO    I spent about 45 minutes on this consultation, with >50% time spent face-to-face counseling and coordinating care.

## 2018-02-10 LAB — LDL CHOLESTEROL DIRECT: 145 MG/DL

## 2018-02-22 ENCOUNTER — OFFICE VISIT (OUTPATIENT)
Dept: PULMONOLOGY | Age: 33
End: 2018-02-22

## 2018-02-22 VITALS
RESPIRATION RATE: 16 BRPM | DIASTOLIC BLOOD PRESSURE: 74 MMHG | OXYGEN SATURATION: 98 % | HEART RATE: 83 BPM | HEIGHT: 64 IN | WEIGHT: 293 LBS | BODY MASS INDEX: 50.02 KG/M2 | SYSTOLIC BLOOD PRESSURE: 108 MMHG

## 2018-02-22 DIAGNOSIS — Z01.811 PREOP PULMONARY/RESPIRATORY EXAM: Primary | ICD-10-CM

## 2018-02-22 PROCEDURE — 99242 OFF/OP CONSLTJ NEW/EST SF 20: CPT | Performed by: INTERNAL MEDICINE

## 2018-02-22 ASSESSMENT — ENCOUNTER SYMPTOMS
EYE REDNESS: 0
SHORTNESS OF BREATH: 0
COUGH: 0
WHEEZING: 0

## 2018-02-22 NOTE — PROGRESS NOTES
[facility] Pulmonary and Critical Care    Outpatient Note    Subjective:   CHIEF COMPLAINT: No chief complaint on file. HPI:     The patient is 27 y.o. female who presents today for a new patient visit for preop pulmonary eval for gastric sleeve surgery. No hx of chronic lung condition  Hx of bronchitis last year. No hx of recurrent infection. No hx of heart problems. Fairly functional.  She can walk 1.5h on level ground. She she can go two flights of stairs and then gets SOB. No hx of anemia. No prior problems with knee surgeries. No hx of sleep apnea. Hx of snoring. No hx of witnessed apnea. Refreshed in the morning with no naps. Past Medical History:    Past Medical History:   Diagnosis Date    Arthritis     left knee    Hypertension        Social History:    History   Smoking Status    Never Smoker   Smokeless Tobacco    Never Used       Family History:  Family History   Problem Relation Age of Onset    High Blood Pressure Mother     Other Mother      migraine headaches    Cancer Father      lymphoma    High Blood Pressure Sister     Other Sister      migraine headaches    Arthritis Maternal Grandmother     Arthritis Maternal Grandfather     Cancer Paternal Grandmother      esophageal    Arthritis Paternal Grandmother     Arthritis Paternal Grandfather        Current Medications:  Current Outpatient Prescriptions on File Prior to Visit   Medication Sig Dispense Refill    diclofenac (VOLTAREN) 75 MG EC tablet Take 1 tablet by mouth 2 times daily 1 tablet by mouth twice a day 60 tablet 2    lisinopril (PRINIVIL;ZESTRIL) 40 MG tablet TAKE ONE TABLET BY MOUTH DAILY 30 tablet 3    chlorthalidone (HYGROTON) 25 MG tablet TAKE ONE TABLET BY MOUTH DAILY 30 tablet 3    vitamin D (ERGOCALCIFEROL) 16305 units CAPS capsule TAKE ONE CAPSULE BY MOUTH ONCE WEEKLY 12 capsule 0     No current facility-administered medications on file prior to visit.         REVIEW OF SYSTEMS:    Review of Systems   Constitutional: Negative for chills and fever. HENT: Negative for congestion. Eyes: Negative for redness. Respiratory: Negative for cough, shortness of breath and wheezing. Cardiovascular: Negative for chest pain, palpitations and leg swelling. Neurological: Negative for weakness. Psychiatric/Behavioral: The patient is not nervous/anxious. All other systems reviewed and are negative. Objective:   PHYSICAL EXAM:        VITALS:  There were no vitals taken for this visit. Physical Exam   Constitutional: She is oriented to person, place, and time. She appears well-developed and well-nourished. HENT:   Head: Normocephalic and atraumatic. Eyes: EOM are normal. Pupils are equal, round, and reactive to light. Neck: Neck supple. No JVD present. No tracheal deviation present. Cardiovascular: Normal rate and regular rhythm. Exam reveals no gallop and no friction rub. No murmur heard. Pulmonary/Chest: Effort normal and breath sounds normal. No stridor. No respiratory distress. She has no wheezes. She has no rales. Abdominal: Soft. Bowel sounds are normal. She exhibits no distension. Musculoskeletal: She exhibits no edema or deformity. Neurological: She is alert and oriented to person, place, and time. Skin: Skin is warm and dry. Psychiatric: She has a normal mood and affect. Her behavior is normal.   Vitals reviewed. DATA:    HGB on 2/10/18 12.9    Assessment:     1.  Preop pulmonary/respiratory exam       Plan:   27years old morbidly obese patient is here for preop pulmonary evaluation before gastric sleeve surgery  She had good functional status with no limitation on level ground  No history of recurrent chest infections  No history of anemia  Pulse ox at rest is 98%  Apart from hypertension she doesn't have any chronic illnesses  ASA-3 based on morbid obesity alone  Non smoker  ARISCAT risk index is 15    She is considered low risk for

## 2018-03-07 ENCOUNTER — OFFICE VISIT (OUTPATIENT)
Dept: BARIATRICS/WEIGHT MGMT | Age: 33
End: 2018-03-07

## 2018-03-07 VITALS
DIASTOLIC BLOOD PRESSURE: 82 MMHG | SYSTOLIC BLOOD PRESSURE: 146 MMHG | HEART RATE: 89 BPM | HEIGHT: 64 IN | BODY MASS INDEX: 50.02 KG/M2 | WEIGHT: 293 LBS

## 2018-03-07 DIAGNOSIS — E66.01 MORBID OBESITY WITH BMI OF 50.0-59.9, ADULT (HCC): Primary | ICD-10-CM

## 2018-03-07 DIAGNOSIS — M17.12 PRIMARY OSTEOARTHRITIS OF LEFT KNEE: ICD-10-CM

## 2018-03-07 DIAGNOSIS — I10 ESSENTIAL HYPERTENSION: ICD-10-CM

## 2018-03-07 PROCEDURE — 99214 OFFICE O/P EST MOD 30 MIN: CPT | Performed by: SURGERY

## 2018-03-07 RX ORDER — ERGOCALCIFEROL 1.25 MG/1
CAPSULE ORAL
Qty: 12 CAPSULE | Refills: 2 | Status: SHIPPED | OUTPATIENT
Start: 2018-03-07 | End: 2019-02-18 | Stop reason: SDUPTHER

## 2018-03-07 NOTE — PROGRESS NOTES
patient  General ROS: negative  Psychological ROS: negative  ENT ROS: negative  Allergy and Immunology ROS: negative  Hematological and Lymphatic ROS: negative  Endocrine ROS: negative  Respiratory ROS: negative  Cardiovascular ROS: negative  Gastrointestinal ROS:negative  Genito-Urinary ROS: negative  Musculoskeletal ROS: negative   Skin ROS: negative    Physical Exam   Vitals Reviewed   Constitutional: Patient is oriented to person, place, and time. Patient appears well-developed and well-nourished. Patient is active and cooperative. Non-toxic appearance. No distress. Nose: Nose normal. No nose lacerations or nasal deformity. Eyes: Conjunctivae, EOM and lids are normal. Right eye exhibits no discharge. No foreign body present in the right eye. Left eye exhibits no discharge. No foreign body present in the left eye. No scleral icterus. Neck: Trachea normal and normal range of motion. No JVD present. Pulmonary/Chest: Effort normal. No accessory muscle usage or stridor. No apnea. No respiratory distress. Cardiovascular: Normal rate and no JVD. Abdominal: Normal appearance. Patient exhibits no distension. Abdomen is soft, obese, non tender. Musculoskeletal: Normal range of motion. Patient exhibits no edema. Neurological: Patient is alert and oriented to person, place, and time. Patient has normal strength. GCS eye subscore is 4. GCS verbal subscore is 5. GCS motor subscore is 6. Skin: Skin is warm and dry. No abrasion and no rash noted. Patient is not diaphoretic. No cyanosis or erythema. Psychiatric: Patient has a normal mood and affect. Speech is normal and behavior is normal. Cognition and memory are normal.       A/P    Dorsie Base is 27 y.o. female, Body mass index is 52.97 kg/m². pre surgery, has lost 11# since last visit. The patient underwent dietary counseling with registered dietician. I have reviewed, discussed and agree with the dietary plan.  Patient is trying hard to keep

## 2018-03-12 ENCOUNTER — OFFICE VISIT (OUTPATIENT)
Dept: INTERNAL MEDICINE CLINIC | Age: 33
End: 2018-03-12

## 2018-03-12 VITALS
SYSTOLIC BLOOD PRESSURE: 142 MMHG | DIASTOLIC BLOOD PRESSURE: 86 MMHG | WEIGHT: 293 LBS | OXYGEN SATURATION: 99 % | BODY MASS INDEX: 52.7 KG/M2 | HEART RATE: 78 BPM

## 2018-03-12 DIAGNOSIS — I10 ESSENTIAL HYPERTENSION: Primary | ICD-10-CM

## 2018-03-12 LAB
ANION GAP SERPL CALCULATED.3IONS-SCNC: 19 MMOL/L (ref 3–16)
BUN BLDV-MCNC: 15 MG/DL (ref 7–20)
CALCIUM SERPL-MCNC: 8.9 MG/DL (ref 8.3–10.6)
CHLORIDE BLD-SCNC: 100 MMOL/L (ref 99–110)
CO2: 23 MMOL/L (ref 21–32)
CREAT SERPL-MCNC: 0.8 MG/DL (ref 0.6–1.1)
GFR AFRICAN AMERICAN: >60
GFR NON-AFRICAN AMERICAN: >60
GLUCOSE BLD-MCNC: 99 MG/DL (ref 70–99)
POTASSIUM SERPL-SCNC: 4 MMOL/L (ref 3.5–5.1)
SODIUM BLD-SCNC: 142 MMOL/L (ref 136–145)

## 2018-03-12 PROCEDURE — 99213 OFFICE O/P EST LOW 20 MIN: CPT | Performed by: NURSE PRACTITIONER

## 2018-03-12 ASSESSMENT — ENCOUNTER SYMPTOMS: EYES NEGATIVE: 1

## 2018-03-20 DIAGNOSIS — E66.01 OBESITY, MORBID, BMI 50 OR HIGHER (HCC): Primary | ICD-10-CM

## 2018-03-29 RX ORDER — SODIUM CHLORIDE, SODIUM LACTATE, POTASSIUM CHLORIDE, CALCIUM CHLORIDE 600; 310; 30; 20 MG/100ML; MG/100ML; MG/100ML; MG/100ML
INJECTION, SOLUTION INTRAVENOUS CONTINUOUS
Status: CANCELLED | OUTPATIENT
Start: 2018-03-29

## 2018-03-29 RX ORDER — SODIUM CHLORIDE 0.9 % (FLUSH) 0.9 %
10 SYRINGE (ML) INJECTION EVERY 12 HOURS SCHEDULED
Status: CANCELLED | OUTPATIENT
Start: 2018-03-29

## 2018-03-29 RX ORDER — LIDOCAINE HYDROCHLORIDE 10 MG/ML
1 INJECTION, SOLUTION EPIDURAL; INFILTRATION; INTRACAUDAL; PERINEURAL
Status: CANCELLED | OUTPATIENT
Start: 2018-03-29 | End: 2018-03-29

## 2018-03-29 RX ORDER — SODIUM CHLORIDE 0.9 % (FLUSH) 0.9 %
10 SYRINGE (ML) INJECTION PRN
Status: CANCELLED | OUTPATIENT
Start: 2018-03-29

## 2018-03-30 ENCOUNTER — HOSPITAL ENCOUNTER (OUTPATIENT)
Dept: ENDOSCOPY | Age: 33
Discharge: HOME | End: 2018-03-30
Attending: SURGERY | Admitting: SURGERY

## 2018-03-30 VITALS
RESPIRATION RATE: 15 BRPM | SYSTOLIC BLOOD PRESSURE: 116 MMHG | OXYGEN SATURATION: 98 % | WEIGHT: 293 LBS | HEIGHT: 64 IN | TEMPERATURE: 97.5 F | HEART RATE: 71 BPM | DIASTOLIC BLOOD PRESSURE: 77 MMHG | BODY MASS INDEX: 50.02 KG/M2

## 2018-03-30 LAB — PREGNANCY, URINE: NEGATIVE

## 2018-03-30 PROCEDURE — 43239 EGD BIOPSY SINGLE/MULTIPLE: CPT | Performed by: SURGERY

## 2018-03-30 ASSESSMENT — PAIN SCALES - GENERAL: PAINLEVEL_OUTOF10: 0

## 2018-03-30 ASSESSMENT — LIFESTYLE VARIABLES: SMOKING_STATUS: 0

## 2018-03-30 NOTE — PROGRESS NOTES
Admitted to PACU bed 6 from endoscopy. Placed on cardiac monitor.   Report received from CRNA and Endo RN
Crutches   []Drain    [] Ty Lawrence   []Other  [] Inpatient / significant other understands the plan for transfer to the inpatient unit

## 2018-03-30 NOTE — ANESTHESIA PRE-OP
Department of Anesthesiology  Preprocedure Note       Name:  Casper Velasquez   Age:  27 y.o.  :  1985                                          MRN:  [id number]         Date:  3/30/2018      Surgeon:Mike    Procedure:EGD    Medications prior to admission:   Prior to Admission medications    Medication Sig Start Date End Date Taking? Authorizing Provider   vitamin D (ERGOCALCIFEROL) 30399 units CAPS capsule TAKE ONE CAPSULE BY MOUTH ONCE WEEKLY 3/7/18   Ketan Damon NP   lisinopril (PRINIVIL;ZESTRIL) 40 MG tablet TAKE ONE TABLET BY MOUTH DAILY 18   Ketan Damon NP   chlorthalidone (HYGROTON) 25 MG tablet TAKE ONE TABLET BY MOUTH DAILY 18   Ketan Damon NP       Current medications:    Current Outpatient Prescriptions   Medication Sig Dispense Refill    vitamin D (ERGOCALCIFEROL) 20161 units CAPS capsule TAKE ONE CAPSULE BY MOUTH ONCE WEEKLY 12 capsule 2    lisinopril (PRINIVIL;ZESTRIL) 40 MG tablet TAKE ONE TABLET BY MOUTH DAILY 30 tablet 3    chlorthalidone (HYGROTON) 25 MG tablet TAKE ONE TABLET BY MOUTH DAILY 30 tablet 3     No current facility-administered medications for this encounter.         Allergies:  No Known Allergies    Problem List:    Patient Active Problem List   Diagnosis Code    Left knee pain M25.562    Acquired varus deformity knee M21.169    Effusion of knee joint M25.469    Lateral subluxation of patella S83.013A    Primary osteoarthritis of left knee M17.12    Obesity, morbid, BMI 50 or higher ([facility] [address] E66.01    Essential hypertension I10       Past Medical History:        Diagnosis Date    Arthritis     left knee    Hypertension        Past Surgical History:        Procedure Laterality Date    KNEE SURGERY Bilateral     patella realignment-2 on right knee and one on left       Social History:    Social History   Substance Use Topics    Smoking status: Never Smoker    Smokeless tobacco: Never Used    Alcohol use 0.0 oz/week      Comment: once a week

## 2018-03-30 NOTE — OP NOTE
Esophagogastroduodenoscopy     Indications: Pre-op gastric Surgery, history of / rule out Gastroesophageal reflux disease. Pre-operative Diagnosis: Obesity/pre-op bariatric surgery . Post-operative Diagnosis: Hiatal Hernia Hill Grade 2, mild esophagitis    Surgeon: Chris Miller DO    Anesthesia: MAC      Procedure Details   The patient was seen in the Holding Room. The risks, benefits, complications, treatment options, and expected outcomes were discussed with the patient. Pre-op Endoscopy recommended to rule any intragastric pathology that would interfere with proposed procedure /  And or due to current conditions. The possibilities of reaction to medication, pulmonary aspiration, perforation of viscus, bleeding, recurrent infection, the need for additional procedures, failure to diagnose a condition, and creating a complication requiring transfusion or operation were discussed with the patient. The patient concurred with the proposed plan, giving informed consent. The site of surgery properly noted/marked. The patient was taken to Endoscopy Suite, identified as Lisette Dee and the procedure verified as  Esophagogastroduodenoscopy  A Time Out was held and the above information confirmed. Upper Endoscopy: An endoscope was inserted through the oropharynx into the upper esophagus. The endoscope was passed into the stomach to the level of the pylorus and passed to the duodenum where the ampulla was visualized and duodenum was normal. Then the scope was retracted back to the stomach and it was normal , biopsy of the antrum obtained, then retroflexed and the gastroesophageal junction was inspected.  There was a moderate sized hiatal hernia and no evidence of Marcano's     Findings:  Esophageal findings include:  Upper: normal Esophageal Mucosa  Lower:normal Esophageal Mucosa  Distal: abnormal Esophageal Mucosa      Gastric findings include: normal Gastric Mucosa    Duodenal Findings: normal Duodenal

## 2018-04-13 ENCOUNTER — OFFICE VISIT (OUTPATIENT)
Dept: CARDIOLOGY CLINIC | Age: 33
End: 2018-04-13

## 2018-04-13 VITALS
WEIGHT: 293 LBS | HEART RATE: 71 BPM | BODY MASS INDEX: 52.11 KG/M2 | SYSTOLIC BLOOD PRESSURE: 120 MMHG | DIASTOLIC BLOOD PRESSURE: 60 MMHG

## 2018-04-13 DIAGNOSIS — E66.01 OBESITY, MORBID, BMI 50 OR HIGHER (HCC): ICD-10-CM

## 2018-04-13 DIAGNOSIS — Z01.818 PRE-OP EXAM: Primary | ICD-10-CM

## 2018-04-13 DIAGNOSIS — I10 ESSENTIAL HYPERTENSION: ICD-10-CM

## 2018-04-13 PROCEDURE — 93000 ELECTROCARDIOGRAM COMPLETE: CPT | Performed by: INTERNAL MEDICINE

## 2018-04-13 PROCEDURE — 99204 OFFICE O/P NEW MOD 45 MIN: CPT | Performed by: INTERNAL MEDICINE

## 2018-04-17 RX ORDER — OSELTAMIVIR PHOSPHATE 75 MG/1
75 CAPSULE ORAL 2 TIMES DAILY
Qty: 10 CAPSULE | Refills: 0 | Status: SHIPPED | OUTPATIENT
Start: 2018-04-17 | End: 2018-04-22

## 2018-04-18 ENCOUNTER — OFFICE VISIT (OUTPATIENT)
Dept: BARIATRICS/WEIGHT MGMT | Age: 33
End: 2018-04-18

## 2018-04-18 VITALS
DIASTOLIC BLOOD PRESSURE: 76 MMHG | SYSTOLIC BLOOD PRESSURE: 135 MMHG | BODY MASS INDEX: 50.02 KG/M2 | HEIGHT: 64 IN | WEIGHT: 293 LBS

## 2018-04-18 DIAGNOSIS — K21.00 HIATAL HERNIA WITH GERD AND ESOPHAGITIS: ICD-10-CM

## 2018-04-18 DIAGNOSIS — I10 ESSENTIAL HYPERTENSION: ICD-10-CM

## 2018-04-18 DIAGNOSIS — K44.9 HIATAL HERNIA WITH GERD AND ESOPHAGITIS: ICD-10-CM

## 2018-04-18 DIAGNOSIS — M17.12 PRIMARY OSTEOARTHRITIS OF LEFT KNEE: ICD-10-CM

## 2018-04-18 DIAGNOSIS — E66.01 MORBID OBESITY WITH BMI OF 50.0-59.9, ADULT (HCC): Primary | ICD-10-CM

## 2018-04-18 PROCEDURE — 99214 OFFICE O/P EST MOD 30 MIN: CPT | Performed by: SURGERY

## 2018-04-24 ENCOUNTER — TELEPHONE (OUTPATIENT)
Dept: BARIATRICS/WEIGHT MGMT | Age: 33
End: 2018-04-24

## 2018-05-21 DIAGNOSIS — I10 ESSENTIAL HYPERTENSION: ICD-10-CM

## 2018-05-21 RX ORDER — LISINOPRIL 40 MG/1
TABLET ORAL
Qty: 30 TABLET | Refills: 5 | Status: SHIPPED | OUTPATIENT
Start: 2018-05-21 | End: 2018-12-08 | Stop reason: SDUPTHER

## 2018-05-21 RX ORDER — CHLORTHALIDONE 25 MG/1
TABLET ORAL
Qty: 30 TABLET | Refills: 5 | Status: SHIPPED | OUTPATIENT
Start: 2018-05-21 | End: 2018-12-08 | Stop reason: SDUPTHER

## 2018-08-22 ENCOUNTER — TELEPHONE (OUTPATIENT)
Dept: INTERNAL MEDICINE CLINIC | Age: 33
End: 2018-08-22

## 2018-08-22 RX ORDER — SULFAMETHOXAZOLE AND TRIMETHOPRIM 800; 160 MG/1; MG/1
1 TABLET ORAL 2 TIMES DAILY
Qty: 6 TABLET | Refills: 0 | Status: SHIPPED | OUTPATIENT
Start: 2018-08-22 | End: 2018-08-25

## 2018-08-22 NOTE — TELEPHONE ENCOUNTER
PT CALLING SAYING SHE WOKE UP THIS MORNING WITH WHAT SHE IS SURE IS A UTI. SHE SAYS SHE HAS THE CLASSIC SYMPTOMS OF URGENCY AND NOTHING HAPPENS, \"CRAMP\" IN THE LOWER ABDOMEN. NO BLOOD IN URINE, NO FEVER, NO BACK PAIN. SHE IS WANTING MED CALLED IN TO [facility] [phone] SAYING SHE IS REALLY NOT ABLE TO COME IN TO THE OFFICE TODAY. NKDA. NOT PREGNANT OR BREAST FEEDING. PLEASE LET HER KNOW WHAT CAN BE DONE.

## 2018-09-10 ENCOUNTER — OFFICE VISIT (OUTPATIENT)
Dept: INTERNAL MEDICINE CLINIC | Age: 33
End: 2018-09-10

## 2018-09-10 VITALS
WEIGHT: 293 LBS | OXYGEN SATURATION: 99 % | BODY MASS INDEX: 52.66 KG/M2 | SYSTOLIC BLOOD PRESSURE: 130 MMHG | HEART RATE: 88 BPM | DIASTOLIC BLOOD PRESSURE: 82 MMHG

## 2018-09-10 DIAGNOSIS — Z23 FLU VACCINE NEED: ICD-10-CM

## 2018-09-10 DIAGNOSIS — I10 ESSENTIAL HYPERTENSION: Primary | ICD-10-CM

## 2018-09-10 LAB
ANION GAP SERPL CALCULATED.3IONS-SCNC: 14 MMOL/L (ref 3–16)
BUN BLDV-MCNC: 15 MG/DL (ref 7–20)
CALCIUM SERPL-MCNC: 9.4 MG/DL (ref 8.3–10.6)
CHLORIDE BLD-SCNC: 100 MMOL/L (ref 99–110)
CO2: 24 MMOL/L (ref 21–32)
CREAT SERPL-MCNC: 0.7 MG/DL (ref 0.6–1.1)
GFR AFRICAN AMERICAN: >60
GFR NON-AFRICAN AMERICAN: >60
GLUCOSE BLD-MCNC: 116 MG/DL (ref 70–99)
POTASSIUM SERPL-SCNC: 3.9 MMOL/L (ref 3.5–5.1)
SODIUM BLD-SCNC: 138 MMOL/L (ref 136–145)

## 2018-09-10 PROCEDURE — 99213 OFFICE O/P EST LOW 20 MIN: CPT | Performed by: NURSE PRACTITIONER

## 2018-09-10 PROCEDURE — 90686 IIV4 VACC NO PRSV 0.5 ML IM: CPT | Performed by: NURSE PRACTITIONER

## 2018-09-10 PROCEDURE — 90471 IMMUNIZATION ADMIN: CPT | Performed by: NURSE PRACTITIONER

## 2018-09-10 ASSESSMENT — ENCOUNTER SYMPTOMS: RESPIRATORY NEGATIVE: 1

## 2018-12-08 DIAGNOSIS — I10 ESSENTIAL HYPERTENSION: ICD-10-CM

## 2018-12-10 RX ORDER — LISINOPRIL 40 MG/1
TABLET ORAL
Qty: 30 TABLET | Refills: 4 | Status: SHIPPED | OUTPATIENT
Start: 2018-12-10 | End: 2019-03-12 | Stop reason: SINTOL

## 2018-12-10 RX ORDER — CHLORTHALIDONE 25 MG/1
TABLET ORAL
Qty: 30 TABLET | Refills: 4 | Status: SHIPPED | OUTPATIENT
Start: 2018-12-10 | End: 2019-05-31 | Stop reason: SDUPTHER

## 2019-02-20 RX ORDER — ERGOCALCIFEROL 1.25 MG/1
CAPSULE ORAL
Qty: 12 CAPSULE | Refills: 1 | Status: SHIPPED | OUTPATIENT
Start: 2019-02-20 | End: 2019-06-24 | Stop reason: SDUPTHER

## 2019-03-12 RX ORDER — LOSARTAN POTASSIUM 25 MG/1
25 TABLET ORAL DAILY
Qty: 30 TABLET | Refills: 0 | Status: SHIPPED | OUTPATIENT
Start: 2019-03-12 | End: 2019-04-09 | Stop reason: SDUPTHER

## 2019-04-09 RX ORDER — LOSARTAN POTASSIUM 25 MG/1
TABLET ORAL
Qty: 30 TABLET | Refills: 0 | Status: SHIPPED | OUTPATIENT
Start: 2019-04-09 | End: 2019-04-25 | Stop reason: SDUPTHER

## 2019-04-29 RX ORDER — LOSARTAN POTASSIUM 25 MG/1
TABLET ORAL
Qty: 30 TABLET | Refills: 0 | Status: SHIPPED | OUTPATIENT
Start: 2019-04-29 | End: 2019-06-24 | Stop reason: SDUPTHER

## 2019-05-31 ENCOUNTER — TELEPHONE (OUTPATIENT)
Dept: PRIMARY CARE CLINIC | Age: 34
End: 2019-05-31

## 2019-05-31 DIAGNOSIS — I10 ESSENTIAL HYPERTENSION: ICD-10-CM

## 2019-05-31 RX ORDER — CHLORTHALIDONE 25 MG/1
TABLET ORAL
Qty: 30 TABLET | Refills: 0 | Status: SHIPPED | OUTPATIENT
Start: 2019-05-31 | End: 2019-06-24 | Stop reason: SDUPTHER

## 2019-06-18 RX ORDER — LOSARTAN POTASSIUM 25 MG/1
TABLET ORAL
Qty: 30 TABLET | Refills: 0 | OUTPATIENT
Start: 2019-06-18

## 2019-06-18 NOTE — TELEPHONE ENCOUNTER
Last Visit: 09.10.18  Next Visit: none   Return in about 6 months (around 3/10/2019) for Hypertension.

## 2019-06-19 RX ORDER — LOSARTAN POTASSIUM 25 MG/1
TABLET ORAL
Qty: 30 TABLET | Refills: 0 | OUTPATIENT
Start: 2019-06-19

## 2019-06-19 NOTE — TELEPHONE ENCOUNTER
Last Visit: 09.10.18  Next Visit: none    Return in about 6 months (around 3/10/2019) for Hypertension

## 2019-06-24 ENCOUNTER — OFFICE VISIT (OUTPATIENT)
Dept: PRIMARY CARE CLINIC | Age: 34
End: 2019-06-24
Payer: COMMERCIAL

## 2019-06-24 VITALS
OXYGEN SATURATION: 97 % | WEIGHT: 293 LBS | SYSTOLIC BLOOD PRESSURE: 129 MMHG | BODY MASS INDEX: 50.02 KG/M2 | HEIGHT: 64 IN | DIASTOLIC BLOOD PRESSURE: 83 MMHG | HEART RATE: 71 BPM

## 2019-06-24 DIAGNOSIS — I10 ESSENTIAL HYPERTENSION: Primary | ICD-10-CM

## 2019-06-24 DIAGNOSIS — I10 ESSENTIAL HYPERTENSION: ICD-10-CM

## 2019-06-24 DIAGNOSIS — E66.01 OBESITY, MORBID, BMI 50 OR HIGHER (HCC): ICD-10-CM

## 2019-06-24 LAB
ANION GAP SERPL CALCULATED.3IONS-SCNC: 14 MMOL/L (ref 3–16)
BUN BLDV-MCNC: 11 MG/DL (ref 7–20)
CALCIUM SERPL-MCNC: 9.2 MG/DL (ref 8.3–10.6)
CHLORIDE BLD-SCNC: 105 MMOL/L (ref 99–110)
CO2: 22 MMOL/L (ref 21–32)
CREAT SERPL-MCNC: 0.7 MG/DL (ref 0.6–1.1)
GFR AFRICAN AMERICAN: >60
GFR NON-AFRICAN AMERICAN: >60
GLUCOSE BLD-MCNC: 97 MG/DL (ref 70–99)
POTASSIUM SERPL-SCNC: 3.9 MMOL/L (ref 3.5–5.1)
SODIUM BLD-SCNC: 141 MMOL/L (ref 136–145)

## 2019-06-24 PROCEDURE — 99213 OFFICE O/P EST LOW 20 MIN: CPT | Performed by: NURSE PRACTITIONER

## 2019-06-24 RX ORDER — CHLORTHALIDONE 25 MG/1
TABLET ORAL
Qty: 90 TABLET | Refills: 0 | Status: SHIPPED | OUTPATIENT
Start: 2019-06-24 | End: 2019-10-07 | Stop reason: SDUPTHER

## 2019-06-24 RX ORDER — LOSARTAN POTASSIUM 25 MG/1
TABLET ORAL
Qty: 90 TABLET | Refills: 0 | Status: SHIPPED | OUTPATIENT
Start: 2019-06-24 | End: 2019-10-17 | Stop reason: SDUPTHER

## 2019-06-24 RX ORDER — ERGOCALCIFEROL 1.25 MG/1
CAPSULE ORAL
Qty: 12 CAPSULE | Refills: 0 | Status: SHIPPED | OUTPATIENT
Start: 2019-06-24 | End: 2019-10-17 | Stop reason: SDUPTHER

## 2019-06-24 NOTE — PROGRESS NOTES
Subjective:      Patient ID: Conrad Almaraz is a 37 y.o. female. HPI 38 yo female presents for htn follow up. She is no longer doing the weight loss surgery bc insurance denied it. She is currently working with [employer]. Has lost 12 lb. Patient past medical history, family history, social, and smoking reviewed and updated as pertinent. Health maintenance has been reviewed. Prior to Visit Medications    Medication Sig Taking? Authorizing Provider   losartan (COZAAR) 25 MG tablet TAKE ONE TABLET BY MOUTH DAILY Yes JORDY Baer CNP   vitamin D (ERGOCALCIFEROL) 41897 units CAPS capsule TAKE ONE CAPSULE BY MOUTH ONCE WEEKLY Yes JORDY Baer CNP   chlorthalidone (HYGROTON) 25 MG tablet TAKE ONE TABLET BY MOUTH DAILY Yes JORDY Baer CNP         Review of Systems   Constitutional: Negative. Cardiovascular: Negative for chest pain. Neurological: Negative for dizziness. All other systems reviewed and are negative. Objective:   Physical Exam   Constitutional: She is oriented to person, place, and time. She appears well-developed and well-nourished. Cardiovascular: Normal rate, regular rhythm and normal heart sounds. Pulmonary/Chest: Effort normal and breath sounds normal.   Neurological: She is alert and oriented to person, place, and time. Skin: Skin is warm and dry. Assessment:       Diagnosis Orders   1. Obesity, morbid, BMI 50 or higher ([facility])  Hemoglobin A1C   2.  Essential hypertension  chlorthalidone (HYGROTON) 25 MG tablet    BASIC METABOLIC PANEL    Hemoglobin A1C           Plan:      bp stable  Continue current therapy  Labs as above  rtc 6 months        JORDY Baer CNP

## 2019-06-25 LAB
ESTIMATED AVERAGE GLUCOSE: 128.4 MG/DL
HBA1C MFR BLD: 6.1 %

## 2019-08-20 ENCOUNTER — TELEPHONE (OUTPATIENT)
Dept: ORTHOPEDIC SURGERY | Age: 34
End: 2019-08-20

## 2019-08-20 ENCOUNTER — OFFICE VISIT (OUTPATIENT)
Dept: ORTHOPEDIC SURGERY | Age: 34
End: 2019-08-20
Payer: COMMERCIAL

## 2019-08-20 VITALS
BODY MASS INDEX: 49.34 KG/M2 | HEART RATE: 90 BPM | HEIGHT: 64 IN | DIASTOLIC BLOOD PRESSURE: 76 MMHG | WEIGHT: 289 LBS | SYSTOLIC BLOOD PRESSURE: 139 MMHG

## 2019-08-20 DIAGNOSIS — S93.601A FOOT SPRAIN, RIGHT, INITIAL ENCOUNTER: ICD-10-CM

## 2019-08-20 DIAGNOSIS — M79.671 RIGHT FOOT PAIN: Primary | ICD-10-CM

## 2019-08-20 PROCEDURE — 99213 OFFICE O/P EST LOW 20 MIN: CPT | Performed by: ORTHOPAEDIC SURGERY

## 2019-08-20 PROCEDURE — L3260 AMBULATORY SURGICAL BOOT EAC: HCPCS | Performed by: ORTHOPAEDIC SURGERY

## 2019-08-20 PROCEDURE — 29405 APPL SHORT LEG CAST: CPT | Performed by: ORTHOPAEDIC SURGERY

## 2019-08-23 ENCOUNTER — TELEPHONE (OUTPATIENT)
Dept: ORTHOPEDIC SURGERY | Age: 34
End: 2019-08-23

## 2019-08-26 ENCOUNTER — OFFICE VISIT (OUTPATIENT)
Dept: ORTHOPEDIC SURGERY | Age: 34
End: 2019-08-26
Payer: COMMERCIAL

## 2019-08-26 VITALS
SYSTOLIC BLOOD PRESSURE: 134 MMHG | BODY MASS INDEX: 49.34 KG/M2 | WEIGHT: 289 LBS | DIASTOLIC BLOOD PRESSURE: 77 MMHG | HEIGHT: 64 IN | HEART RATE: 76 BPM

## 2019-08-26 DIAGNOSIS — S93.601A FOOT SPRAIN, RIGHT, INITIAL ENCOUNTER: Primary | ICD-10-CM

## 2019-08-26 DIAGNOSIS — M79.671 RIGHT FOOT PAIN: ICD-10-CM

## 2019-08-26 PROCEDURE — 99213 OFFICE O/P EST LOW 20 MIN: CPT | Performed by: ORTHOPAEDIC SURGERY

## 2019-08-26 NOTE — PROGRESS NOTES
resource strain: Not on file    Food insecurity:     Worry: Not on file     Inability: Not on file    Transportation needs:     Medical: Not on file     Non-medical: Not on file   Tobacco Use    Smoking status: Never Smoker    Smokeless tobacco: Never Used   Substance and Sexual Activity    Alcohol use:  Yes     Alcohol/week: 0.0 standard drinks     Comment: once a week    Drug use: No    Sexual activity: Yes     Partners: Male   Lifestyle    Physical activity:     Days per week: Not on file     Minutes per session: Not on file    Stress: Not on file   Relationships    Social connections:     Talks on phone: Not on file     Gets together: Not on file     Attends Mosque service: Not on file     Active member of club or organization: Not on file     Attends meetings of clubs or organizations: Not on file     Relationship status: Not on file    Intimate partner violence:     Fear of current or ex partner: Not on file     Emotionally abused: Not on file     Physically abused: Not on file     Forced sexual activity: Not on file   Other Topics Concern    Not on file   Social History Narrative    Not on file     Family History   Problem Relation Age of Onset    High Blood Pressure Mother     Other Mother         migraine headaches    Cancer Father         lymphoma    High Blood Pressure Sister     Other Sister         migraine headaches    Arthritis Maternal Grandmother     Arthritis Maternal Grandfather     Cancer Paternal Grandmother         esophageal    Arthritis Paternal Grandmother     Arthritis Paternal Grandfather        Current Medications:    Current Outpatient Medications   Medication Sig Dispense Refill    losartan (COZAAR) 25 MG tablet TAKE ONE TABLET BY MOUTH DAILY 90 tablet 0    vitamin D (ERGOCALCIFEROL) 14951 units CAPS capsule TAKE ONE CAPSULE BY MOUTH ONCE WEEKLY 12 capsule 0    chlorthalidone (HYGROTON) 25 MG tablet TAKE ONE TABLET BY MOUTH DAILY 90 tablet 0     No current documented assessment and plan. Stephanie Osborn.  Felice Ruiz MD

## 2019-09-17 ENCOUNTER — OFFICE VISIT (OUTPATIENT)
Dept: ORTHOPEDIC SURGERY | Age: 34
End: 2019-09-17
Payer: COMMERCIAL

## 2019-09-17 VITALS
DIASTOLIC BLOOD PRESSURE: 68 MMHG | SYSTOLIC BLOOD PRESSURE: 119 MMHG | WEIGHT: 289 LBS | HEART RATE: 84 BPM | BODY MASS INDEX: 49.34 KG/M2 | HEIGHT: 64 IN

## 2019-09-17 DIAGNOSIS — S93.601A FOOT SPRAIN, RIGHT, INITIAL ENCOUNTER: Primary | ICD-10-CM

## 2019-09-17 PROCEDURE — 99213 OFFICE O/P EST LOW 20 MIN: CPT | Performed by: ORTHOPAEDIC SURGERY

## 2019-09-17 NOTE — PROGRESS NOTES
Date:  2019    Name:  Luigi Sher  Address:  [address]    :  1985      Age:   37 y.o.    SSN:  [id number]      Medical Record Number:  [id number]    Reason for Visit:    Chief Complaint    Foot Pain (follow up on right foot-Midfoot sprain with small nondisplaced osteophyte fracture proximal first metatarsal, reports feeling better- about 3/10 pain)      History of Present Illness:  Luigi Sher is a 37 y.o. female presenting for reevaluation of right foot. Patient was seen approximate 1 month ago with right midfoot sprain in addition to small avulsion type injury to proximal first metatarsal.  She has been immobilized in a short leg walking cast for the past 4 weeks with a cast shoe. She states that she is doing better. Her only area of tenderness in her foot now is at the end of the cast.  She denies any numbness or tingling. Review of Systems:  A 14 point review of systems available in the scanned medical record as documented by the patient. The review is negative with the exception of those things mentioned in the History of Present Illness and Past Medical History. Past History:  Past Medical History:   Diagnosis Date    Arthritis     left knee    Hypertension      Past Surgical History:   Procedure Laterality Date    KNEE SURGERY Bilateral     patella realignment-2 on right knee and one on left    OTHER SURGICAL HISTORY N/A 2018    EGD with biopsy     Current Outpatient Medications on File Prior to Visit   Medication Sig Dispense Refill    losartan (COZAAR) 25 MG tablet TAKE ONE TABLET BY MOUTH DAILY 90 tablet 0    vitamin D (ERGOCALCIFEROL) 55164 units CAPS capsule TAKE ONE CAPSULE BY MOUTH ONCE WEEKLY 12 capsule 0    chlorthalidone (HYGROTON) 25 MG tablet TAKE ONE TABLET BY MOUTH DAILY 90 tablet 0     No current facility-administered medications on file prior to visit.       Social History     Socioeconomic History

## 2019-09-27 ENCOUNTER — OFFICE VISIT (OUTPATIENT)
Dept: ORTHOPEDIC SURGERY | Age: 34
End: 2019-09-27
Payer: COMMERCIAL

## 2019-09-27 VITALS
HEIGHT: 64 IN | WEIGHT: 289 LBS | DIASTOLIC BLOOD PRESSURE: 74 MMHG | HEART RATE: 80 BPM | BODY MASS INDEX: 49.34 KG/M2 | SYSTOLIC BLOOD PRESSURE: 127 MMHG

## 2019-09-27 DIAGNOSIS — R29.898 ANKLE WEAKNESS: ICD-10-CM

## 2019-09-27 DIAGNOSIS — M79.671 RIGHT FOOT PAIN: ICD-10-CM

## 2019-09-27 DIAGNOSIS — S93.601D FOOT SPRAIN, RIGHT, SUBSEQUENT ENCOUNTER: Primary | ICD-10-CM

## 2019-09-27 PROCEDURE — 99213 OFFICE O/P EST LOW 20 MIN: CPT | Performed by: ORTHOPAEDIC SURGERY

## 2019-09-27 NOTE — PROGRESS NOTES
Strength: 4 out of 5 weakness to the peroneal tendons     Palpation: Pain through the plantar aspect of the metatarsals heads, tenderness to palpation over the first metatarsal cuneiform joint, tender to palpation over the base of the fifth metatarsal    Neurovascular: Skin is warm and well-perfused. Sensation normal.    Left foot Comparison Exam:    Inspection: There is normal alignment. No swelling or ecchymosis is present. Gait: Patient is able to weight-bear without pain. Range of motion: Patient can perform a toe rise without pain. There is full range of motion of the ankle, midfoot, hindfoot and forefoot. Stability: No instability is present. Strength: Normal strength is present. Palpation: There is no focal tenderness. Neurovascular: Skin is warm and well-perfused. Sensation normal.      Radiology:       No new images         Assessment :  37 y.o. female with right midfoot sprain, ankle weakness, healing proximal first metatarsal osteophyte avulsion fracture    Impression:  Encounter Diagnoses   Name Primary?  Foot sprain, right, subsequent encounter Yes    Ankle weakness     Right foot pain        Office Procedures:  Orders Placed This Encounter   Procedures    Ambulatory referral to Physical Therapy     Referral Priority:   Routine     Referral Type:   Eval and Treat     Referral Reason:   Specialty Services Required     Requested Specialty:   Physical Therapy     Number of Visits Requested:   1           Plan: Pertinent imaging was reviewed. The etiology, natural history, and treatment options for the disorder were discussed. The roles of activity medication, antiinflammatories, bracing, and physical therapy were all described to the patient and questions were answered. We believe patient is a candidate for formal physical therapy for stretching strengthening and modalities as indicated. We have given her prescription for physical therapy today.   She also can

## 2019-09-27 NOTE — LETTER
Patient Name: Nisreen Coronel MRN: [id number]  DOS: 9/27/2019   Diagnosis:   1. Foot sprain, right, subsequent encounter    2. Ankle weakness    3. Right foot pain                           Goal:  [x]Decrease Pain and/or Swelling [x]Increase ROM and/or Flexibility     [x]Increase Function                           [x]Increase Strength and/or Endurance   []Other   Evaluation:  [x]Evaluation and Treatment []KT-1000   []Isokinetic Exam   []Preoperative Eval    Recommended Modalities:  [x]Modalities of Choice      []HCVS            []Electrical Stimulation     [] Remove Dressing  []Ultrasound        []TENS/TNS    [] Lumbar Traction           [] Cervical Traction   []Phonophoresis         []Hot Pack/Cold Pack   []PT Treatment, Unlisted []Other:  Therapeutic Exercises:    [x]Isometrics    [x]Range of Motion []Progressive Exer. []Balance Coordination   []Flexibility  []ROM Limited  []Total Hip Replacement   []Passive  [x]ROM Full   []Total Knee Replacement  []Active Assisted    []Shoulder Impingement Prog  []Active   []Tennis Elbow Program   []Capsular Shift Regular        []Isokinetics      []Spine Program   []Straight Leg Raises  [x] Gait    []Fixation                   [] Supine                                              [] Running   [] Extension   [] Prone   [] Throwing   [] Stabilization   [] AB    [] [place] Hurter   [] AD      [] Spine Eval   [] Cervical Eval  [x] Conditioning   [] Lumbar  [] Stationary Bike   [] [place] Track   [] Lumbar Exer.  [] Stairmaster         [] Treadmill   [] Functional Cap [] Aquatic Prog.      [] Return to work    Treatment Program:  Frequency: [] 1x  [x] 2x  [] 3x  [] 4x  [] 5x week/month  Duration: [] 1  [] 2  [] 3  [x] 4  [] 5 week/month  Weight Bearing: [] Non  [] 1/4  [] 1/2  [] 3/4  [x] Full  ROM: [] Restricted  [x] Full  [] Follow established:        [] Other:

## 2019-10-01 ENCOUNTER — HOSPITAL ENCOUNTER (OUTPATIENT)
Dept: PHYSICAL THERAPY | Age: 34
Setting detail: THERAPIES SERIES
Discharge: HOME | End: 2019-10-01
Payer: COMMERCIAL

## 2019-10-01 PROCEDURE — 97110 THERAPEUTIC EXERCISES: CPT | Performed by: PHYSICAL THERAPIST

## 2019-10-01 PROCEDURE — 97161 PT EVAL LOW COMPLEX 20 MIN: CPT | Performed by: PHYSICAL THERAPIST

## 2019-10-07 ENCOUNTER — HOSPITAL ENCOUNTER (OUTPATIENT)
Dept: PHYSICAL THERAPY | Age: 34
Setting detail: THERAPIES SERIES
Discharge: HOME | End: 2019-10-07
Payer: COMMERCIAL

## 2019-10-07 DIAGNOSIS — I10 ESSENTIAL HYPERTENSION: ICD-10-CM

## 2019-10-07 PROCEDURE — 97112 NEUROMUSCULAR REEDUCATION: CPT | Performed by: PHYSICAL THERAPIST

## 2019-10-07 PROCEDURE — 97110 THERAPEUTIC EXERCISES: CPT | Performed by: PHYSICAL THERAPIST

## 2019-10-09 RX ORDER — CHLORTHALIDONE 25 MG/1
TABLET ORAL
Qty: 90 TABLET | Refills: 0 | Status: SHIPPED | OUTPATIENT
Start: 2019-10-09 | End: 2020-01-21

## 2019-10-11 ENCOUNTER — HOSPITAL ENCOUNTER (OUTPATIENT)
Dept: PHYSICAL THERAPY | Age: 34
Setting detail: THERAPIES SERIES
Discharge: HOME | End: 2019-10-11
Payer: COMMERCIAL

## 2019-10-11 PROCEDURE — 97112 NEUROMUSCULAR REEDUCATION: CPT | Performed by: PHYSICAL THERAPIST

## 2019-10-11 PROCEDURE — 97110 THERAPEUTIC EXERCISES: CPT | Performed by: PHYSICAL THERAPIST

## 2019-10-16 ENCOUNTER — HOSPITAL ENCOUNTER (OUTPATIENT)
Dept: PHYSICAL THERAPY | Age: 34
Setting detail: THERAPIES SERIES
Discharge: HOME | End: 2019-10-16
Payer: COMMERCIAL

## 2019-10-16 PROCEDURE — 97110 THERAPEUTIC EXERCISES: CPT | Performed by: PHYSICAL THERAPIST

## 2019-10-16 PROCEDURE — 97112 NEUROMUSCULAR REEDUCATION: CPT | Performed by: PHYSICAL THERAPIST

## 2019-10-17 ENCOUNTER — OFFICE VISIT (OUTPATIENT)
Dept: PRIMARY CARE CLINIC | Age: 34
End: 2019-10-17
Payer: COMMERCIAL

## 2019-10-17 VITALS
DIASTOLIC BLOOD PRESSURE: 80 MMHG | WEIGHT: 288.6 LBS | BODY MASS INDEX: 49.27 KG/M2 | TEMPERATURE: 98.1 F | SYSTOLIC BLOOD PRESSURE: 122 MMHG | HEIGHT: 64 IN | HEART RATE: 68 BPM

## 2019-10-17 DIAGNOSIS — E66.01 OBESITY, MORBID, BMI 50 OR HIGHER (HCC): ICD-10-CM

## 2019-10-17 DIAGNOSIS — Z23 FLU VACCINE NEED: ICD-10-CM

## 2019-10-17 DIAGNOSIS — R73.02 IMPAIRED GLUCOSE TOLERANCE: ICD-10-CM

## 2019-10-17 DIAGNOSIS — I10 ESSENTIAL HYPERTENSION: Primary | ICD-10-CM

## 2019-10-17 PROCEDURE — 90471 IMMUNIZATION ADMIN: CPT | Performed by: FAMILY MEDICINE

## 2019-10-17 PROCEDURE — 99213 OFFICE O/P EST LOW 20 MIN: CPT | Performed by: FAMILY MEDICINE

## 2019-10-17 PROCEDURE — 90686 IIV4 VACC NO PRSV 0.5 ML IM: CPT | Performed by: FAMILY MEDICINE

## 2019-10-17 RX ORDER — LOSARTAN POTASSIUM 25 MG/1
TABLET ORAL
Qty: 90 TABLET | Refills: 1 | Status: SHIPPED | OUTPATIENT
Start: 2019-10-17 | End: 2020-04-23

## 2019-10-17 RX ORDER — ERGOCALCIFEROL 1.25 MG/1
CAPSULE ORAL
Qty: 12 CAPSULE | Refills: 1 | Status: SHIPPED | OUTPATIENT
Start: 2019-10-17 | End: 2021-03-04

## 2019-10-17 ASSESSMENT — PATIENT HEALTH QUESTIONNAIRE - PHQ9
SUM OF ALL RESPONSES TO PHQ QUESTIONS 1-9: 0
SUM OF ALL RESPONSES TO PHQ9 QUESTIONS 1 & 2: 0
1. LITTLE INTEREST OR PLEASURE IN DOING THINGS: 0
2. FEELING DOWN, DEPRESSED OR HOPELESS: 0
SUM OF ALL RESPONSES TO PHQ QUESTIONS 1-9: 0

## 2019-10-17 ASSESSMENT — ENCOUNTER SYMPTOMS
VOMITING: 0
DIARRHEA: 0
NAUSEA: 0
SHORTNESS OF BREATH: 0
ABDOMINAL PAIN: 0

## 2019-10-18 ENCOUNTER — HOSPITAL ENCOUNTER (OUTPATIENT)
Dept: PHYSICAL THERAPY | Age: 34
Setting detail: THERAPIES SERIES
Discharge: HOME | End: 2019-10-18
Payer: COMMERCIAL

## 2019-10-18 PROCEDURE — 97112 NEUROMUSCULAR REEDUCATION: CPT | Performed by: PHYSICAL THERAPIST

## 2019-10-18 PROCEDURE — 97110 THERAPEUTIC EXERCISES: CPT | Performed by: PHYSICAL THERAPIST

## 2019-10-21 NOTE — TELEPHONE ENCOUNTER
Warfarin- No protocol.  LR: 07/18/19 #90  LOV: 09/11/19 (CPX)  1yr. F/u advised.  Last INR: 10/18/19, 1 wk recheck advised.  Pending f/u MA/RN apt: 10/25/19    rx sent to pharmacy.  If no improvement in symptoms she will need to come in for appt

## 2019-10-22 ENCOUNTER — HOSPITAL ENCOUNTER (OUTPATIENT)
Dept: PHYSICAL THERAPY | Age: 34
Setting detail: THERAPIES SERIES
Discharge: HOME | End: 2019-10-22
Payer: COMMERCIAL

## 2019-10-22 PROCEDURE — 97110 THERAPEUTIC EXERCISES: CPT | Performed by: PHYSICAL THERAPIST

## 2019-10-22 PROCEDURE — 97112 NEUROMUSCULAR REEDUCATION: CPT | Performed by: PHYSICAL THERAPIST

## 2019-10-28 ENCOUNTER — HOSPITAL ENCOUNTER (OUTPATIENT)
Dept: PHYSICAL THERAPY | Age: 34
Setting detail: THERAPIES SERIES
Discharge: HOME | End: 2019-10-28
Payer: COMMERCIAL

## 2019-10-28 PROCEDURE — 97110 THERAPEUTIC EXERCISES: CPT | Performed by: PHYSICAL THERAPIST

## 2019-10-28 PROCEDURE — 97140 MANUAL THERAPY 1/> REGIONS: CPT | Performed by: PHYSICAL THERAPIST

## 2019-10-28 PROCEDURE — G0283 ELEC STIM OTHER THAN WOUND: HCPCS | Performed by: PHYSICAL THERAPIST

## 2019-10-28 PROCEDURE — 97112 NEUROMUSCULAR REEDUCATION: CPT | Performed by: PHYSICAL THERAPIST

## 2019-11-04 ENCOUNTER — HOSPITAL ENCOUNTER (OUTPATIENT)
Dept: PHYSICAL THERAPY | Age: 34
Setting detail: THERAPIES SERIES
Discharge: HOME | End: 2019-11-04
Payer: COMMERCIAL

## 2019-11-04 PROCEDURE — 97110 THERAPEUTIC EXERCISES: CPT | Performed by: PHYSICAL THERAPIST

## 2019-11-04 PROCEDURE — G0283 ELEC STIM OTHER THAN WOUND: HCPCS | Performed by: PHYSICAL THERAPIST

## 2019-11-04 PROCEDURE — 97112 NEUROMUSCULAR REEDUCATION: CPT | Performed by: PHYSICAL THERAPIST

## 2019-11-04 PROCEDURE — 97140 MANUAL THERAPY 1/> REGIONS: CPT | Performed by: PHYSICAL THERAPIST

## 2019-11-11 ENCOUNTER — HOSPITAL ENCOUNTER (OUTPATIENT)
Dept: PHYSICAL THERAPY | Age: 34
Setting detail: THERAPIES SERIES
Discharge: HOME | End: 2019-11-11
Payer: COMMERCIAL

## 2019-11-11 PROCEDURE — 97112 NEUROMUSCULAR REEDUCATION: CPT | Performed by: PHYSICAL THERAPIST

## 2019-11-11 PROCEDURE — 97140 MANUAL THERAPY 1/> REGIONS: CPT | Performed by: PHYSICAL THERAPIST

## 2019-11-11 PROCEDURE — 97110 THERAPEUTIC EXERCISES: CPT | Performed by: PHYSICAL THERAPIST

## 2019-11-18 ENCOUNTER — HOSPITAL ENCOUNTER (OUTPATIENT)
Dept: PHYSICAL THERAPY | Age: 34
Setting detail: THERAPIES SERIES
Discharge: HOME | End: 2019-11-18
Payer: COMMERCIAL

## 2019-11-18 PROCEDURE — 97110 THERAPEUTIC EXERCISES: CPT | Performed by: PHYSICAL THERAPIST

## 2019-11-18 PROCEDURE — 97140 MANUAL THERAPY 1/> REGIONS: CPT | Performed by: PHYSICAL THERAPIST

## 2019-11-18 PROCEDURE — 97112 NEUROMUSCULAR REEDUCATION: CPT | Performed by: PHYSICAL THERAPIST

## 2019-11-25 ENCOUNTER — HOSPITAL ENCOUNTER (OUTPATIENT)
Dept: PHYSICAL THERAPY | Age: 34
Setting detail: THERAPIES SERIES
End: 2019-11-25
Payer: COMMERCIAL

## 2019-11-27 ENCOUNTER — HOSPITAL ENCOUNTER (OUTPATIENT)
Dept: PHYSICAL THERAPY | Age: 34
Setting detail: THERAPIES SERIES
End: 2019-11-27
Payer: COMMERCIAL

## 2019-12-04 ENCOUNTER — HOSPITAL ENCOUNTER (OUTPATIENT)
Dept: PHYSICAL THERAPY | Age: 34
Setting detail: THERAPIES SERIES
Discharge: HOME | End: 2019-12-04
Payer: COMMERCIAL

## 2019-12-04 PROCEDURE — 97110 THERAPEUTIC EXERCISES: CPT | Performed by: PHYSICAL THERAPIST

## 2019-12-04 PROCEDURE — 97140 MANUAL THERAPY 1/> REGIONS: CPT | Performed by: PHYSICAL THERAPIST

## 2019-12-04 PROCEDURE — 97112 NEUROMUSCULAR REEDUCATION: CPT | Performed by: PHYSICAL THERAPIST

## 2019-12-18 ENCOUNTER — HOSPITAL ENCOUNTER (OUTPATIENT)
Dept: PHYSICAL THERAPY | Age: 34
Setting detail: THERAPIES SERIES
Discharge: HOME | End: 2019-12-18
Payer: COMMERCIAL

## 2019-12-18 PROCEDURE — 97110 THERAPEUTIC EXERCISES: CPT | Performed by: PHYSICAL THERAPIST

## 2019-12-18 PROCEDURE — 97530 THERAPEUTIC ACTIVITIES: CPT | Performed by: PHYSICAL THERAPIST

## 2019-12-18 PROCEDURE — 97112 NEUROMUSCULAR REEDUCATION: CPT | Performed by: PHYSICAL THERAPIST

## 2020-01-21 RX ORDER — CHLORTHALIDONE 25 MG/1
TABLET ORAL
Qty: 90 TABLET | Refills: 0 | Status: SHIPPED | OUTPATIENT
Start: 2020-01-21 | End: 2020-04-23

## 2020-02-26 ENCOUNTER — OFFICE VISIT (OUTPATIENT)
Dept: ORTHOPEDIC SURGERY | Age: 35
End: 2020-02-26
Payer: COMMERCIAL

## 2020-02-26 VITALS
HEIGHT: 64 IN | SYSTOLIC BLOOD PRESSURE: 128 MMHG | HEART RATE: 85 BPM | BODY MASS INDEX: 50.02 KG/M2 | WEIGHT: 293 LBS | DIASTOLIC BLOOD PRESSURE: 89 MMHG

## 2020-02-26 PROBLEM — M79.671 RIGHT FOOT PAIN: Status: ACTIVE | Noted: 2020-02-26

## 2020-02-26 PROCEDURE — 99203 OFFICE O/P NEW LOW 30 MIN: CPT | Performed by: ORTHOPAEDIC SURGERY

## 2020-02-27 NOTE — PROGRESS NOTES
 Cancer Paternal Grandmother         esophageal    Arthritis Paternal Grandmother     Arthritis Paternal Grandfather        Current Medications:    Current Outpatient Medications   Medication Sig Dispense Refill    chlorthalidone (HYGROTON) 25 MG tablet TAKE ONE TABLET BY MOUTH DAILY . **NO FURTHER REFILLS UNTIL SEEN BY MD ** 90 tablet 0    losartan (COZAAR) 25 MG tablet TAKE ONE TABLET BY MOUTH DAILY 90 tablet 1    vitamin D (ERGOCALCIFEROL) 86336 units CAPS capsule TAKE ONE CAPSULE BY MOUTH ONCE WEEKLY 12 capsule 1     No current facility-administered medications for this visit. Allergies:  No Known Allergies    Physical Exam:  Vitals:    02/26/20 0904   BP: 128/89   Pulse: 85       Physical Exam   Constitutional: Patient is oriented to person, place, and time and well-developed, well-nourished, and in no distress. HENT:   Head: Normocephalic and atraumatic. Eyes: Pupils are equal, round, and reactive to light. Neck: No tracheal deviation present. No thyromegaly present. Pulmonary/Chest: Effort normal.   Abdominal: Soft. There is no guarding. Musculoskeletal: Patient exhibits tenderness and pain. Neurological: Patient is alert and oriented to person, place, and time. Skin: Skin is warm. Psychiatric: Affect normal.     General: Alfredo Downs is a healthy and well appearing 37y.o. year old female who is sitting comfortably in our office in acute distress. Alert and oriented. Physical Exam:  RUE:   No gross deformities noted. Sensation is intact to light touch throughout the median, ulnar and radial nerve distribution. Able to wiggle fingers, gives thumbs up, A-okay and cross index and middle fingers. Full range of motion of the hand, wrist, elbow and shoulder. LUE:   No gross deformities noted. Sensation is intact to light touch throughout the median, ulnar and radial nerve distribution.    Able to wiggle fingers, gives thumbs up, A-okay and cross index and middle and whether this relates to sesamoid issues or additional tendon or bony abnormality  2. Right knee pain, unspecified chronicity    - XR KNEE RIGHT (MIN 4 VIEWS); Future  Right knee x-rays 4 view AP lateral PA flexion and sunrise shows mild to moderate arthritis medially and mild to moderate at the patellofemoral joint with lateral translation of the patella    Assessment: I discussed the diagnosis of arthritis with the patient. We've discussed treatment options which would include anti-inflammatory medication, physical therapy, bracing, cortisone injections, and Visco supplementation. We have also discussed the benefits of low impact exercise and weight loss. We have also discussed the possibility of joint replacement surgery in the future. With regard to knee arthritis certainly her symptoms are worsened because of foot issues.   We have recommended at least shoe inserts for both her feet and nutritional supplements for the knees to see if there can be some ability to control some of her pain with the other than nonsteroidal anti-inflammatories    We will see her back after the MRI    [unfilled]     Dung Avina    Date:    2/27/2020

## 2020-03-03 ENCOUNTER — TELEPHONE (OUTPATIENT)
Dept: ORTHOPEDIC SURGERY | Age: 35
End: 2020-03-03

## 2020-03-03 RX ORDER — TRAMADOL HYDROCHLORIDE 50 MG/1
50 TABLET ORAL EVERY 4 HOURS PRN
Qty: 42 TABLET | Refills: 0 | Status: SHIPPED | OUTPATIENT
Start: 2020-03-03 | End: 2020-03-10

## 2020-03-03 NOTE — TELEPHONE ENCOUNTER
PT HAS SEVER PAIN IN KNEE AND NOT GETTING ANY RELIEF. STATES SHE'S TAKING IBUPROPEN THAT'S NOT HELPING AT ALL.  SHE ALSO WONDER IF A MRI IS NEEDED FOR KNEE TO SEE WHAT'S GOING ON WITH IT.

## 2020-03-06 ENCOUNTER — TELEPHONE (OUTPATIENT)
Dept: ORTHOPEDIC SURGERY | Age: 35
End: 2020-03-06

## 2020-03-09 ENCOUNTER — TELEPHONE (OUTPATIENT)
Dept: ORTHOPEDIC SURGERY | Age: 35
End: 2020-03-09

## 2020-03-11 ENCOUNTER — OFFICE VISIT (OUTPATIENT)
Dept: ORTHOPEDIC SURGERY | Age: 35
End: 2020-03-11
Payer: COMMERCIAL

## 2020-03-11 VITALS
BODY MASS INDEX: 50.02 KG/M2 | WEIGHT: 293 LBS | SYSTOLIC BLOOD PRESSURE: 120 MMHG | DIASTOLIC BLOOD PRESSURE: 63 MMHG | HEIGHT: 64 IN | HEART RATE: 83 BPM

## 2020-03-11 PROBLEM — M25.561 CHRONIC PAIN OF RIGHT KNEE: Status: ACTIVE | Noted: 2020-03-11

## 2020-03-11 PROBLEM — G89.29 CHRONIC PAIN OF RIGHT KNEE: Status: ACTIVE | Noted: 2020-03-11

## 2020-03-11 PROCEDURE — 20610 DRAIN/INJ JOINT/BURSA W/O US: CPT | Performed by: ORTHOPAEDIC SURGERY

## 2020-03-11 PROCEDURE — 99214 OFFICE O/P EST MOD 30 MIN: CPT | Performed by: ORTHOPAEDIC SURGERY

## 2020-03-11 RX ORDER — LIDOCAINE HYDROCHLORIDE 10 MG/ML
20 INJECTION, SOLUTION INFILTRATION; PERINEURAL ONCE
Status: COMPLETED | OUTPATIENT
Start: 2020-03-11 | End: 2020-03-11

## 2020-03-11 RX ORDER — HYDROCODONE BITARTRATE AND ACETAMINOPHEN 5; 325 MG/1; MG/1
1 TABLET ORAL EVERY 4 HOURS PRN
Qty: 30 TABLET | Refills: 0 | Status: SHIPPED | OUTPATIENT
Start: 2020-03-11 | End: 2020-04-09 | Stop reason: SDUPTHER

## 2020-03-11 RX ORDER — BUPIVACAINE HYDROCHLORIDE 2.5 MG/ML
30 INJECTION, SOLUTION INFILTRATION; PERINEURAL ONCE
Status: COMPLETED | OUTPATIENT
Start: 2020-03-11 | End: 2020-03-11

## 2020-03-11 RX ORDER — TRIAMCINOLONE ACETONIDE 40 MG/ML
40 INJECTION, SUSPENSION INTRA-ARTICULAR; INTRAMUSCULAR ONCE
Status: COMPLETED | OUTPATIENT
Start: 2020-03-11 | End: 2020-03-11

## 2020-03-11 RX ADMIN — TRIAMCINOLONE ACETONIDE 40 MG: 40 INJECTION, SUSPENSION INTRA-ARTICULAR; INTRAMUSCULAR at 09:43

## 2020-03-11 RX ADMIN — LIDOCAINE HYDROCHLORIDE 20 ML: 10 INJECTION, SOLUTION INFILTRATION; PERINEURAL at 09:58

## 2020-03-11 RX ADMIN — BUPIVACAINE HYDROCHLORIDE 75 MG: 2.5 INJECTION, SOLUTION INFILTRATION; PERINEURAL at 09:57

## 2020-03-11 NOTE — PROGRESS NOTES
Date:  3/11/2020    Name:  Adi Maldonado  Address:  [address]    :  1985      Age:   37 y.o.    SSN:  [id number]      Medical Record Number:  <[id number]>    Reason for Visit:    Chief Complaint    Knee Pain (RT KNEE-MRI RESULTS) and Foot Pain (RT FOOT-MRI RESULTS)  MRI evaluation of the foot shows some sesamoiditis and some potentially some osteoarthritis at the base of the first metatarsal.    Bipartite type sesamoid was indicated rather than a fracture that was suggested by the x-rays. MRI which was also reviewed with the patient of the knee shows significant osteoarthritis of the lateral patellofemoral joint and evidence of joint space narrowing chondral loss erosions but no major uptake in the bone. Had joint stem cell show some meniscal alterations that are degenerative but not through and through tears and some mild chondral fissuring      She is continuing to have pain although inserts have improved some of the pain in her foot she still limping and is very tearful in the office today because of significant knee pain. She is attempted some nutritional supplements including tart cherry as well as CBD with limited improvement in pain    Tramadol does help for some period of time but does not allow her to sleep more than 3 hours  DOS:      HPI: Jose Elias Lou is a 37 y.o. female here today for right foot pain from an injury back in August of last year. She was evaluated at another orthopedist office at that point placed into a cast and has had slow resolution of the problem but continued pain in the medial aspect of the great toe and foot plantarly.   She is reached a plateau and is now having increased pain with difficulty that is compounded upon knees that have had significant arthritis with previous patellofemoral realignment    She had secondary surgery on the right knee with screw removal left knee still has residual residual screw in the tibial tubercle         Review of Systems:  Review of Systems   Constitutional: Negative for chills and fever. HENT: Negative for nosebleeds. Eyes: Negative for double vision. Cardiovascular: Negative for chest pain. Gastrointestinal: Negative for abdominal pain. Musculoskeletal: Positive for joint pain and myalgias. Skin: Negative for rash. Neurological: Negative for seizures. Psychiatric/Behavioral: Negative for hallucinations. Past History:  Past Medical History:   Diagnosis Date    Arthritis     left knee    Hypertension     Impaired fasting glucose      Past Surgical History:   Procedure Laterality Date    KNEE SURGERY Bilateral     patella realignment-2 on right knee and one on left    OTHER SURGICAL HISTORY N/A 03/30/2018    EGD with biopsy     Current Outpatient Medications on File Prior to Visit   Medication Sig Dispense Refill    chlorthalidone (HYGROTON) 25 MG tablet TAKE ONE TABLET BY MOUTH DAILY . **NO FURTHER REFILLS UNTIL SEEN BY MD ** 90 tablet 0    losartan (COZAAR) 25 MG tablet TAKE ONE TABLET BY MOUTH DAILY 90 tablet 1    vitamin D (ERGOCALCIFEROL) 24148 units CAPS capsule TAKE ONE CAPSULE BY MOUTH ONCE WEEKLY 12 capsule 1     No current facility-administered medications on file prior to visit. Social History     Socioeconomic History    Marital status: Single     Spouse name: Not on file    Number of children: Not on file    Years of education: Not on file    Highest education level: Not on file   Occupational History    Not on file   Social Needs    Financial resource strain: Not on file    Food insecurity     Worry: Not on file     Inability: Not on file    Transportation needs     Medical: Not on file     Non-medical: Not on file   Tobacco Use    Smoking status: Never Smoker    Smokeless tobacco: Never Used   Substance and Sexual Activity    Alcohol use:  Yes     Alcohol/week: 0.0 standard drinks     Comment: once a week    Drug use: No    Sexual mg Intramuscular Once Melissa Jasso MD           Allergies:  No Known Allergies    Physical Exam:  Vitals:    03/11/20 0902   BP: 120/63   Pulse: 83       Physical Exam   Constitutional: Patient is oriented to person, place, and time and well-developed, well-nourished, and in no distress. HENT:   Head: Normocephalic and atraumatic. Eyes: Pupils are equal, round, and reactive to light. Neck: No tracheal deviation present. No thyromegaly present. Pulmonary/Chest: Effort normal.   Abdominal: Soft. There is no guarding. Musculoskeletal: Patient exhibits tenderness and pain. Neurological: Patient is alert and oriented to person, place, and time. Skin: Skin is warm. Psychiatric: Affect normal.     General: Casi Patel is a healthy and well appearing 37y.o. year old female who is sitting comfortably in our office in acute distress. Alert and oriented. Physical Exam:  RUE:   No gross deformities noted. Sensation is intact to light touch throughout the median, ulnar and radial nerve distribution. Able to wiggle fingers, gives thumbs up, A-okay and cross index and middle fingers. Full range of motion of the hand, wrist, elbow and shoulder. LUE:   No gross deformities noted. Sensation is intact to light touch throughout the median, ulnar and radial nerve distribution. Able to wiggle fingers, gives thumbs up, A-okay and cross index and middle fingers. Full range of motion of the hand wrist elbow and shoulder. RLE: Localized pain and tenderness is noted about the sesamoid and the right great toe she has exacerbated pain with extension of the great toe against resistance and manipulation of the sesamoid also creates exaggerated pain.   Flexion and extension of the great toe against resistance however is fairly controlled she has a relatively good arch and does demonstrate increased pain with weightbearing on that side she tends to try to rotate her foot to weight-bear on the

## 2020-03-12 ENCOUNTER — TELEPHONE (OUTPATIENT)
Dept: ORTHOPEDIC SURGERY | Age: 35
End: 2020-03-12

## 2020-03-12 ENCOUNTER — TELEPHONE (OUTPATIENT)
Dept: PRIMARY CARE CLINIC | Age: 35
End: 2020-03-12

## 2020-03-12 PROCEDURE — E0114 CRUTCH UNDERARM PAIR NO WOOD: HCPCS | Performed by: ORTHOPAEDIC SURGERY

## 2020-03-12 NOTE — TELEPHONE ENCOUNTER
Pt said she was walking and her something pop today and she wants to see if she can get some crutches which she said the doctor told her she could do. Please call to advise. [phone].

## 2020-04-09 ENCOUNTER — VIRTUAL VISIT (OUTPATIENT)
Dept: ORTHOPEDIC SURGERY | Age: 35
End: 2020-04-09
Payer: COMMERCIAL

## 2020-04-09 PROCEDURE — 99442 PR PHYS/QHP TELEPHONE EVALUATION 11-20 MIN: CPT | Performed by: PHYSICIAN ASSISTANT

## 2020-04-09 RX ORDER — HYDROCODONE BITARTRATE AND ACETAMINOPHEN 5; 325 MG/1; MG/1
1 TABLET ORAL EVERY 4 HOURS PRN
Qty: 30 TABLET | Refills: 0 | Status: SHIPPED | OUTPATIENT
Start: 2020-04-09 | End: 2020-04-16

## 2020-04-23 RX ORDER — CHLORTHALIDONE 25 MG/1
TABLET ORAL
Qty: 90 TABLET | Refills: 1 | Status: SHIPPED | OUTPATIENT
Start: 2020-04-23 | End: 2020-11-12

## 2020-04-23 RX ORDER — LOSARTAN POTASSIUM 25 MG/1
TABLET ORAL
Qty: 90 TABLET | Refills: 1 | Status: SHIPPED | OUTPATIENT
Start: 2020-04-23 | End: 2020-11-12

## 2020-05-12 ENCOUNTER — TELEPHONE (OUTPATIENT)
Dept: ORTHOPEDIC SURGERY | Age: 35
End: 2020-05-12

## 2020-05-12 NOTE — TELEPHONE ENCOUNTER
I spoke with the patient and let her know that her surgery has been approved through her insurance. We are looking at 5/29/20 for the surgery date if she can get a pre-op physical by then.

## 2020-05-14 ENCOUNTER — VIRTUAL VISIT (OUTPATIENT)
Dept: PRIMARY CARE CLINIC | Age: 35
End: 2020-05-14
Payer: COMMERCIAL

## 2020-05-14 PROBLEM — M79.671 RIGHT FOOT PAIN: Status: RESOLVED | Noted: 2020-02-26 | Resolved: 2020-05-14

## 2020-05-14 PROCEDURE — 99213 OFFICE O/P EST LOW 20 MIN: CPT | Performed by: FAMILY MEDICINE

## 2020-05-14 RX ORDER — HYDROCODONE BITARTRATE AND ACETAMINOPHEN 5; 325 MG/1; MG/1
1 TABLET ORAL EVERY 8 HOURS PRN
COMMUNITY
End: 2021-03-04 | Stop reason: ALTCHOICE

## 2020-05-14 ASSESSMENT — ENCOUNTER SYMPTOMS
SHORTNESS OF BREATH: 0
COUGH: 0

## 2020-05-20 ENCOUNTER — NURSE ONLY (OUTPATIENT)
Dept: INTERNAL MEDICINE CLINIC | Age: 35
End: 2020-05-20
Payer: COMMERCIAL

## 2020-05-20 VITALS — TEMPERATURE: 98.2 F | BODY MASS INDEX: 51.81 KG/M2 | WEIGHT: 293 LBS

## 2020-05-20 DIAGNOSIS — Z01.818 PRE-OPERATIVE EXAM: ICD-10-CM

## 2020-05-20 DIAGNOSIS — R73.02 IMPAIRED GLUCOSE TOLERANCE: ICD-10-CM

## 2020-05-20 DIAGNOSIS — I10 ESSENTIAL HYPERTENSION: ICD-10-CM

## 2020-05-20 LAB
ANION GAP SERPL CALCULATED.3IONS-SCNC: 15 MMOL/L (ref 3–16)
BASOPHILS ABSOLUTE: 0 K/UL (ref 0–0.2)
BASOPHILS RELATIVE PERCENT: 0.5 %
BUN BLDV-MCNC: 15 MG/DL (ref 7–20)
CALCIUM SERPL-MCNC: 9.5 MG/DL (ref 8.3–10.6)
CHLORIDE BLD-SCNC: 101 MMOL/L (ref 99–110)
CHOLESTEROL, TOTAL: 207 MG/DL (ref 0–199)
CO2: 23 MMOL/L (ref 21–32)
CREAT SERPL-MCNC: 0.7 MG/DL (ref 0.6–1.1)
EOSINOPHILS ABSOLUTE: 0.1 K/UL (ref 0–0.6)
EOSINOPHILS RELATIVE PERCENT: 1.6 %
ESTIMATED AVERAGE GLUCOSE: 122.6 MG/DL
GFR AFRICAN AMERICAN: >60
GFR NON-AFRICAN AMERICAN: >60
GLUCOSE BLD-MCNC: 118 MG/DL (ref 70–99)
HBA1C MFR BLD: 5.9 %
HCT VFR BLD CALC: 40.8 % (ref 36–48)
HDLC SERPL-MCNC: 40 MG/DL (ref 40–60)
HEMOGLOBIN: 13.6 G/DL (ref 12–16)
INR BLD: 1.02 (ref 0.86–1.14)
LDL CHOLESTEROL CALCULATED: ABNORMAL MG/DL
LDL CHOLESTEROL DIRECT: 129 MG/DL
LYMPHOCYTES ABSOLUTE: 1.9 K/UL (ref 1–5.1)
LYMPHOCYTES RELATIVE PERCENT: 23 %
MCH RBC QN AUTO: 27.3 PG (ref 26–34)
MCHC RBC AUTO-ENTMCNC: 33.3 G/DL (ref 31–36)
MCV RBC AUTO: 82.1 FL (ref 80–100)
MONOCYTES ABSOLUTE: 0.7 K/UL (ref 0–1.3)
MONOCYTES RELATIVE PERCENT: 7.9 %
NEUTROPHILS ABSOLUTE: 5.7 K/UL (ref 1.7–7.7)
NEUTROPHILS RELATIVE PERCENT: 67 %
PDW BLD-RTO: 13.8 % (ref 12.4–15.4)
PLATELET # BLD: 347 K/UL (ref 135–450)
PMV BLD AUTO: 7.5 FL (ref 5–10.5)
POTASSIUM SERPL-SCNC: 3.7 MMOL/L (ref 3.5–5.1)
PROTHROMBIN TIME: 11.8 SEC (ref 10–13.2)
RBC # BLD: 4.97 M/UL (ref 4–5.2)
SODIUM BLD-SCNC: 139 MMOL/L (ref 136–145)
TRIGL SERPL-MCNC: 350 MG/DL (ref 0–150)
VLDLC SERPL CALC-MCNC: ABNORMAL MG/DL
WBC # BLD: 8.5 K/UL (ref 4–11)

## 2020-05-20 PROCEDURE — 93000 ELECTROCARDIOGRAM COMPLETE: CPT | Performed by: FAMILY MEDICINE

## 2020-05-29 ENCOUNTER — OUTSIDE SERVICES (OUTPATIENT)
Dept: ORTHOPEDIC SURGERY | Age: 35
End: 2020-05-29
Payer: COMMERCIAL

## 2020-05-29 PROCEDURE — 20610 DRAIN/INJ JOINT/BURSA W/O US: CPT | Performed by: ORTHOPAEDIC SURGERY

## 2020-05-29 PROCEDURE — 29881 ARTHRS KNE SRG MNISECTMY M/L: CPT | Performed by: ORTHOPAEDIC SURGERY

## 2020-05-29 NOTE — PROGRESS NOTES
mmHg.)  A time-out confirmation was performed, according to the universal protocol. Appropriate antibiotics were given prior to the start of the surgery.)   Arthroscopy was then carried out through the inferolateral portal; saline inflow was superomedial, and an inferomedial portal was also utilized. The suprapatellar pouch was examined first.  noneloose bodies were seen. The patella was found to have Grade III chondromalacia of theLateral and Medial  facet(s). The superior compartment had  significant synovitis and fat pad hypertrophy. The medial compartment was then examined. There were none loose bodies. The anterior horn and body of the meniscus weredegenerative  The posterior horn was found to bedegenerative The [address] had  Grade IV  In small areas with flaps and other areas of weight bearing grade III chondromalacia of the weight-bearing portion. The MTP had  Grade II chondromalacia. The ACL was examined and noted to be intact. The lateral compartment was examined. There were none loose bodies. The anterior horn and body of the meniscus were   intact . The posterior horn was found to intact  The LFC had Grade I chondromalacia of the weight-bearing portion. The LTP had Grade I chondromalacia. A Medial menisectomy was performed, using the aggressor shaver and the linear baskets, which were used to morselize the remaining portion of the torn meniscus. The stump of the meniscus was sealed using the ArthroWand coblation tool. The lower portion of the trochlea was noted to have Grade III and small areas of grade 4chondromalacia. A chondroplasty of the MFC, MTP, PATELLA and TROCHLEA. was performed using the ArthroWand coblation tool, through the medial /lateral  portals. Marrow aspirate was obtained through stab incision in the  right anterior iliac crest.   This was fractionated into platelet rich/poor plasma and stem cell aggregate.  Injection was done through needle

## 2020-06-09 ENCOUNTER — OFFICE VISIT (OUTPATIENT)
Dept: ORTHOPEDIC SURGERY | Age: 35
End: 2020-06-09

## 2020-06-09 VITALS
SYSTOLIC BLOOD PRESSURE: 129 MMHG | BODY MASS INDEX: 50.02 KG/M2 | HEART RATE: 90 BPM | TEMPERATURE: 98.1 F | HEIGHT: 64 IN | WEIGHT: 293 LBS | DIASTOLIC BLOOD PRESSURE: 90 MMHG

## 2020-06-09 PROBLEM — M17.11 PRIMARY OSTEOARTHRITIS OF RIGHT KNEE: Status: ACTIVE | Noted: 2020-06-09

## 2020-06-09 PROCEDURE — 99024 POSTOP FOLLOW-UP VISIT: CPT | Performed by: ORTHOPAEDIC SURGERY

## 2020-06-09 NOTE — LETTER
[facility]  [address] [address]  Phone: [phone]  Fax: [phone]    Yasir Brooks MD        June 9, 2020     Patient: Trudy Aleman   YOB: 1985   Date of Visit: 6/9/2020       To Whom It May Concern: It is my medical opinion that Tatum Barnes may return to work on 06/15/2020. If you have any questions or concerns, please don't hesitate to call.     Sincerely,        Yasir Brooks MD

## 2020-06-12 ENCOUNTER — TELEPHONE (OUTPATIENT)
Dept: ORTHOPEDIC SURGERY | Age: 35
End: 2020-06-12

## 2020-08-03 ENCOUNTER — TELEPHONE (OUTPATIENT)
Dept: PRIMARY CARE CLINIC | Age: 35
End: 2020-08-03

## 2020-08-03 ENCOUNTER — NURSE TRIAGE (OUTPATIENT)
Dept: OTHER | Facility: CLINIC | Age: 35
End: 2020-08-03

## 2020-08-03 ENCOUNTER — E-VISIT (OUTPATIENT)
Dept: PRIMARY CARE CLINIC | Age: 35
End: 2020-08-03
Payer: COMMERCIAL

## 2020-08-03 PROCEDURE — 99421 OL DIG E/M SVC 5-10 MIN: CPT | Performed by: FAMILY MEDICINE

## 2020-08-03 RX ORDER — PREDNISONE 20 MG/1
TABLET ORAL
Qty: 18 TABLET | Refills: 0 | Status: SHIPPED | OUTPATIENT
Start: 2020-08-03 | End: 2020-08-12 | Stop reason: SDUPTHER

## 2020-08-03 NOTE — TELEPHONE ENCOUNTER
Reason for Disposition   SEVERE itching interferes with normal activities (e.g., work or school) or prevents sleep    Protocols used: POISON IVY - OAK - SUMAC-ADULT-OH    Received call from [facility]. Caller is reporting poison ivy on her face, neck, arm and hands. Poison ivy continues to spread. Provided care advice to help with symptoms. Recommended caller complete an E-visit. E-visit will not let caller complete at this time. Will set up a VV. Call soft transferred to [address] to schedule appointment. Please do not reply to the triage nurse through this encounter. Any subsequent communication should be directly with the patient.

## 2020-08-12 RX ORDER — PREDNISONE 20 MG/1
TABLET ORAL
Qty: 12 TABLET | Refills: 0 | Status: SHIPPED | OUTPATIENT
Start: 2020-08-12 | End: 2021-03-04

## 2020-08-12 RX ORDER — HYDROXYZINE HYDROCHLORIDE 25 MG/1
25 TABLET, FILM COATED ORAL NIGHTLY
Qty: 30 TABLET | Refills: 0 | Status: SHIPPED | OUTPATIENT
Start: 2020-08-12 | End: 2020-09-11

## 2020-10-12 ENCOUNTER — TELEPHONE (OUTPATIENT)
Dept: PRIMARY CARE CLINIC | Age: 35
End: 2020-10-12

## 2020-11-12 RX ORDER — LOSARTAN POTASSIUM 25 MG/1
TABLET ORAL
Qty: 90 TABLET | Refills: 0 | Status: SHIPPED | OUTPATIENT
Start: 2020-11-12 | End: 2021-02-19

## 2020-11-12 RX ORDER — CHLORTHALIDONE 25 MG/1
TABLET ORAL
Qty: 90 TABLET | Refills: 0 | Status: SHIPPED | OUTPATIENT
Start: 2020-11-12 | End: 2021-02-19

## 2021-03-04 ENCOUNTER — VIRTUAL VISIT (OUTPATIENT)
Dept: PRIMARY CARE CLINIC | Age: 36
End: 2021-03-04
Payer: COMMERCIAL

## 2021-03-04 DIAGNOSIS — M25.561 CHRONIC PAIN OF RIGHT KNEE: ICD-10-CM

## 2021-03-04 DIAGNOSIS — R73.02 IMPAIRED GLUCOSE TOLERANCE: ICD-10-CM

## 2021-03-04 DIAGNOSIS — E78.2 MIXED HYPERLIPIDEMIA: ICD-10-CM

## 2021-03-04 DIAGNOSIS — I10 ESSENTIAL HYPERTENSION: Primary | ICD-10-CM

## 2021-03-04 DIAGNOSIS — G89.29 CHRONIC PAIN OF RIGHT KNEE: ICD-10-CM

## 2021-03-04 PROCEDURE — 99214 OFFICE O/P EST MOD 30 MIN: CPT | Performed by: FAMILY MEDICINE

## 2021-03-04 ASSESSMENT — ENCOUNTER SYMPTOMS
SHORTNESS OF BREATH: 0
VOMITING: 0
DIARRHEA: 0
ABDOMINAL PAIN: 0
NAUSEA: 0

## 2021-03-04 ASSESSMENT — PATIENT HEALTH QUESTIONNAIRE - PHQ9: SUM OF ALL RESPONSES TO PHQ QUESTIONS 1-9: 0

## 2021-03-04 NOTE — PROGRESS NOTES
[address] [facility] [address] PRIMARY CARE  [address] [place] [address]  [address] [facility] [address]  Dept: [phone]  Dept Fax: [phone]     3/4/2021      [place] Dry   1985     Chief Complaint   Patient presents with    Hypertension    Concern For COVID-19       HPI    Pt encounter today completed virtual visit using doxy. me platform. Services were provided through a video synchronous discussion virtually to substitute for in-person clinic visit. Patient and provider were located at their individual homes. Patient is a 37 yo CF with h/o HTN, HLD, IGT and chronic knee pain 2/2 OA. Underwent right knee arthroscopy last spring. Somewhat improved pain level but feels it is starting to get worse with some recent weight gain. She is working on eating better and exercise in an effort to get this weight off. Otherwise, no major changes in her health since last visit. She did get Flu vaccine at place of employment. Taking BP meds as prescribed. No side effects. C/o sore throat and headaches. Went to urgent care yesterday for COVID testing. Awaiting results. Denies high fever, cough, SOB. Prior to Visit Medications    Medication Sig Taking? Authorizing Provider   losartan (COZAAR) 25 MG tablet TAKE ONE TABLET BY MOUTH DAILY Yes Gene Faith,    chlorthalidone (HYGROTON) 25 MG tablet TAKE ONE TABLET BY MOUTH DAILY Yes Gene Faith, DO       Past Medical History:   Diagnosis Date    Arthritis     left knee    Hyperlipidemia     Hypertension     Impaired fasting glucose         Social History     Tobacco Use    Smoking status: Never Smoker    Smokeless tobacco: Never Used   Substance Use Topics    Alcohol use:  Yes     Alcohol/week: 0.0 standard drinks     Comment: once a week    Drug use: No        Past Surgical History:   Procedure Laterality Date    KNEE ARTHROSCOPY Right 05/2020    KNEE SURGERY Bilateral     patella realignment-2 on right knee and one on left    OTHER SURGICAL HISTORY N/A 03/30/2018    EGD with biopsy        No Known Allergies     Family History   Problem Relation Age of Onset    High Blood Pressure Mother     Other Mother         migraine headaches    Cancer Father         lymphoma    High Blood Pressure Sister     Other Sister         migraine headaches    Arthritis Maternal Grandmother     Arthritis Maternal Grandfather     Cancer Paternal Grandmother         esophageal    Arthritis Paternal Grandmother     Arthritis Paternal Grandfather         Patient's past medical history, surgical history, family history, medications, and allergies  were all reviewed and updated as appropriate today. Review of Systems   Constitutional: Negative for chills, fever and unexpected weight change. Respiratory: Negative for shortness of breath. Cardiovascular: Negative for chest pain. Gastrointestinal: Negative for abdominal pain, diarrhea, nausea and vomiting. Vitals unable to obtain and physical exam is limited 2/2 virtual visit nature of this encounter. Physical Exam  Constitutional:       General: She is not in acute distress. Appearance: Normal appearance. She is not ill-appearing. Pulmonary:      Effort: Pulmonary effort is normal.   Neurological:      General: No focal deficit present. Mental Status: She is alert. Psychiatric:         Mood and Affect: Mood normal.         Assessment:  Encounter Diagnoses   Name Primary?  Essential hypertension Yes    Impaired glucose tolerance     Mixed hyperlipidemia     Chronic pain of right knee        Plan:    - Chronic conditions stable. UTD on HM.   - Discussed diet/exercise for weight loss. - F/u ortho for chronic right knee pain. - Supportive care measures for COVID sx.      New Prescriptions    No medications on file        Orders Placed This Encounter   Procedures    CBC WITH AUTO DIFFERENTIAL    COMPREHENSIVE METABOLIC PANEL    HEMOGLOBIN A1C    LIPID PANEL Return in about 6 months (around 9/4/2021) for Physical.     Virtual visit time spent (minutes) - 4302 DO Jovon Littlejohn is a 38 y.o. female being evaluated by a Virtual Visit (video visit) encounter to address concerns as mentioned above. A caregiver was present when appropriate. Due to this being a TeleHealth encounter (During ZREYD-13 public health emergency), evaluation of the following organ systems was limited: Vitals/Constitutional/EENT/Resp/CV/GI//MS/Neuro/Skin/Heme-Lymph-Imm. Pursuant to the emergency declaration under the [address], [address] [place] [address] authority and the [employer] Act, this Virtual Visit was conducted with patient's (and/or legal guardian's) consent, to reduce the patient's risk of exposure to COVID-19 and provide necessary medical care. The patient (and/or legal guardian) has also been advised to contact this office for worsening conditions or problems, and seek emergency medical treatment and/or call 911 if deemed necessary.

## 2021-04-12 NOTE — PATIENT INSTRUCTIONS
Patient received dietary handouts and education. Patient reports since starting Orencia she has been itching on and off all over body.  She reports sores on body and she reports they open up.  She reports fluid comes out of them.  She stopped the Orencia.  Patient saw pcp and prescribed her clotrimazole (LOTRIMIN) 1 % cream since 3-.  She reports sore keep coming back.  She stopped Orencia at least 3 weeks ago.  She still has itching, just a little rash.  She reports Orencia helped her a lot.  She stopped medication and still feels ok.  She does not have a temperature.  She has not contacted pcp again about this.  She is also using hydrocortisone a few times.

## 2021-05-26 DIAGNOSIS — E78.2 MIXED HYPERLIPIDEMIA: ICD-10-CM

## 2021-05-26 DIAGNOSIS — I10 ESSENTIAL HYPERTENSION: ICD-10-CM

## 2021-05-26 DIAGNOSIS — R73.02 IMPAIRED GLUCOSE TOLERANCE: ICD-10-CM

## 2021-05-26 LAB
BASOPHILS ABSOLUTE: 0 K/UL (ref 0–0.2)
BASOPHILS RELATIVE PERCENT: 0.5 %
EOSINOPHILS ABSOLUTE: 0.1 K/UL (ref 0–0.6)
EOSINOPHILS RELATIVE PERCENT: 1.7 %
HCT VFR BLD CALC: 37.6 % (ref 36–48)
HEMOGLOBIN: 12.5 G/DL (ref 12–16)
LYMPHOCYTES ABSOLUTE: 1.7 K/UL (ref 1–5.1)
LYMPHOCYTES RELATIVE PERCENT: 21 %
MCH RBC QN AUTO: 27.5 PG (ref 26–34)
MCHC RBC AUTO-ENTMCNC: 33.2 G/DL (ref 31–36)
MCV RBC AUTO: 83 FL (ref 80–100)
MONOCYTES ABSOLUTE: 0.6 K/UL (ref 0–1.3)
MONOCYTES RELATIVE PERCENT: 7.5 %
NEUTROPHILS ABSOLUTE: 5.5 K/UL (ref 1.7–7.7)
NEUTROPHILS RELATIVE PERCENT: 69.3 %
PDW BLD-RTO: 15 % (ref 12.4–15.4)
PLATELET # BLD: 303 K/UL (ref 135–450)
PMV BLD AUTO: 7.8 FL (ref 5–10.5)
RBC # BLD: 4.53 M/UL (ref 4–5.2)
WBC # BLD: 8 K/UL (ref 4–11)

## 2021-05-27 DIAGNOSIS — I10 ESSENTIAL HYPERTENSION: ICD-10-CM

## 2021-05-27 LAB
A/G RATIO: 1.6 (ref 1.1–2.2)
ALBUMIN SERPL-MCNC: 4.3 G/DL (ref 3.4–5)
ALP BLD-CCNC: 56 U/L (ref 40–129)
ALT SERPL-CCNC: 22 U/L (ref 10–40)
ANION GAP SERPL CALCULATED.3IONS-SCNC: 16 MMOL/L (ref 3–16)
AST SERPL-CCNC: 20 U/L (ref 15–37)
BILIRUB SERPL-MCNC: 0.4 MG/DL (ref 0–1)
BUN BLDV-MCNC: 11 MG/DL (ref 7–20)
CALCIUM SERPL-MCNC: 8.9 MG/DL (ref 8.3–10.6)
CHLORIDE BLD-SCNC: 99 MMOL/L (ref 99–110)
CHOLESTEROL, TOTAL: 195 MG/DL (ref 0–199)
CO2: 21 MMOL/L (ref 21–32)
CREAT SERPL-MCNC: 0.7 MG/DL (ref 0.6–1.1)
ESTIMATED AVERAGE GLUCOSE: 128.4 MG/DL
GFR AFRICAN AMERICAN: >60
GFR NON-AFRICAN AMERICAN: >60
GLOBULIN: 2.7 G/DL
GLUCOSE BLD-MCNC: 125 MG/DL (ref 70–99)
HBA1C MFR BLD: 6.1 %
HDLC SERPL-MCNC: 34 MG/DL (ref 40–60)
LDL CHOLESTEROL CALCULATED: ABNORMAL MG/DL
LDL CHOLESTEROL DIRECT: 108 MG/DL
POTASSIUM SERPL-SCNC: 3.6 MMOL/L (ref 3.5–5.1)
SODIUM BLD-SCNC: 136 MMOL/L (ref 136–145)
TOTAL PROTEIN: 7 G/DL (ref 6.4–8.2)
TRIGL SERPL-MCNC: 361 MG/DL (ref 0–150)
VLDLC SERPL CALC-MCNC: ABNORMAL MG/DL

## 2021-05-27 RX ORDER — CHLORTHALIDONE 25 MG/1
TABLET ORAL
Qty: 90 TABLET | Refills: 0 | Status: SHIPPED | OUTPATIENT
Start: 2021-05-27 | End: 2021-09-07

## 2021-05-27 RX ORDER — LOSARTAN POTASSIUM 25 MG/1
TABLET ORAL
Qty: 90 TABLET | Refills: 0 | Status: SHIPPED | OUTPATIENT
Start: 2021-05-27 | End: 2021-09-07

## 2021-06-01 ENCOUNTER — TELEPHONE (OUTPATIENT)
Dept: PRIMARY CARE CLINIC | Age: 36
End: 2021-06-01

## 2021-06-07 ENCOUNTER — OFFICE VISIT (OUTPATIENT)
Dept: PRIMARY CARE CLINIC | Age: 36
End: 2021-06-07
Payer: COMMERCIAL

## 2021-06-07 VITALS
HEIGHT: 64 IN | WEIGHT: 293 LBS | OXYGEN SATURATION: 99 % | HEART RATE: 90 BPM | DIASTOLIC BLOOD PRESSURE: 85 MMHG | SYSTOLIC BLOOD PRESSURE: 133 MMHG | BODY MASS INDEX: 50.02 KG/M2 | TEMPERATURE: 97.3 F

## 2021-06-07 DIAGNOSIS — Z48.02 ENCOUNTER FOR REMOVAL OF SUTURES: Primary | ICD-10-CM

## 2021-06-07 DIAGNOSIS — R20.0 NUMBNESS OF FINGERS: ICD-10-CM

## 2021-06-07 PROCEDURE — 99212 OFFICE O/P EST SF 10 MIN: CPT | Performed by: NURSE PRACTITIONER

## 2021-06-07 ASSESSMENT — ENCOUNTER SYMPTOMS
SHORTNESS OF BREATH: 0
COUGH: 0

## 2021-06-07 NOTE — PROGRESS NOTES
[address] [facility] [address] PRIMARY CARE  [address] [place] [id number]  Dept: [phone]  Dept Fax: [phone]     6/7/2021      Thom Peck   1985     Chief Complaint   Patient presents with    Suture / Staple Removal     placed 5/30/21  3 sutures  left hand       HPI     Patient presents for suture removal of left hand. She cut herself on a knife while cutting a bagel and had 3 sutures placed at urgent care on 5/30/21. She was given 5 days of an oral antibiotic and also given topical mupirocin that she has been using on it twice a day. She has also been keeping it clean and putting a bandage over it. She does have numbness/tingling in her two small fingers on left hand that occurred after cutting herself; she feels like over past few days she has been getting increased sensation in fingers but it is like a shooting sensation/tingling in fingers. PHQ Scores 3/4/2021 10/17/2019   PHQ2 Score 0 0   PHQ9 Score 0 0     Interpretation of Total Score Depression Severity: 1-4 = Minimal depression, 5-9 = Mild depression, 10-14 = Moderate depression, 15-19 = Moderately severe depression, 20-27 = Severe depression     Prior to Visit Medications    Medication Sig Taking? Authorizing Provider   losartan (COZAAR) 25 MG tablet TAKE ONE TABLET BY MOUTH DAILY Yes JORDY Meyers CNP   chlorthalidone (HYGROTON) 25 MG tablet TAKE ONE TABLET BY MOUTH DAILY Yes JORDY Meyers CNP       Past Medical History:   Diagnosis Date    Arthritis     left knee    Hyperlipidemia     Hypertension     Impaired fasting glucose         Social History     Tobacco Use    Smoking status: Never Smoker    Smokeless tobacco: Never Used   Substance Use Topics    Alcohol use:  Yes     Alcohol/week: 0.0 standard drinks     Comment: once a week    Drug use: No        Past Surgical History:   Procedure Laterality Date    KNEE ARTHROSCOPY Right 05/2020   [facility] KNEE SURGERY patient reports she has had increase in tingling and shooting pain in fingers. Movement is not impaired. Discussed that n/t may take time to resolve and to continue to monitor; if worsening or not improving in a few weeks then follow-up in office. Return if symptoms worsen or fail to improve.              Milagros Haas, APRN - CNP

## 2021-09-04 DIAGNOSIS — I10 ESSENTIAL HYPERTENSION: ICD-10-CM

## 2021-09-08 RX ORDER — LOSARTAN POTASSIUM 25 MG/1
TABLET ORAL
Qty: 90 TABLET | Refills: 1 | Status: SHIPPED | OUTPATIENT
Start: 2021-09-08 | End: 2022-03-31 | Stop reason: SDUPTHER

## 2021-09-08 RX ORDER — CHLORTHALIDONE 25 MG/1
TABLET ORAL
Qty: 90 TABLET | Refills: 1 | Status: SHIPPED | OUTPATIENT
Start: 2021-09-08 | End: 2022-03-31 | Stop reason: SDUPTHER

## 2022-03-31 NOTE — TELEPHONE ENCOUNTER
Pt calling for refills on Losartan and Chlorthalidone to be called in to [address]    She says Vivian Chaparro was supposed to call last week and even gave her 3 loaner pills of each    I see no request and she says she is now totally out of these meds    Last ov 6-7-21 LJ  No future ov

## 2022-04-18 ENCOUNTER — OFFICE VISIT (OUTPATIENT)
Dept: PRIMARY CARE CLINIC | Age: 37
End: 2022-04-18
Payer: COMMERCIAL

## 2022-04-18 VITALS
OXYGEN SATURATION: 96 % | HEART RATE: 81 BPM | TEMPERATURE: 97.2 F | WEIGHT: 293 LBS | DIASTOLIC BLOOD PRESSURE: 79 MMHG | HEIGHT: 64 IN | BODY MASS INDEX: 50.02 KG/M2 | SYSTOLIC BLOOD PRESSURE: 137 MMHG

## 2022-04-18 DIAGNOSIS — Z00.00 ROUTINE PHYSICAL EXAMINATION: ICD-10-CM

## 2022-04-18 DIAGNOSIS — I10 ESSENTIAL HYPERTENSION: ICD-10-CM

## 2022-04-18 DIAGNOSIS — E78.2 MIXED HYPERLIPIDEMIA: ICD-10-CM

## 2022-04-18 DIAGNOSIS — R73.02 IMPAIRED GLUCOSE TOLERANCE: ICD-10-CM

## 2022-04-18 DIAGNOSIS — Z00.00 ROUTINE PHYSICAL EXAMINATION: Primary | ICD-10-CM

## 2022-04-18 LAB
A/G RATIO: 1.7 (ref 1.1–2.2)
ALBUMIN SERPL-MCNC: 4.4 G/DL (ref 3.4–5)
ALP BLD-CCNC: 68 U/L (ref 40–129)
ALT SERPL-CCNC: 25 U/L (ref 10–40)
ANION GAP SERPL CALCULATED.3IONS-SCNC: 17 MMOL/L (ref 3–16)
AST SERPL-CCNC: 20 U/L (ref 15–37)
BASOPHILS ABSOLUTE: 0.1 K/UL (ref 0–0.2)
BASOPHILS RELATIVE PERCENT: 1 %
BILIRUB SERPL-MCNC: 0.4 MG/DL (ref 0–1)
BUN BLDV-MCNC: 10 MG/DL (ref 7–20)
CALCIUM SERPL-MCNC: 9 MG/DL (ref 8.3–10.6)
CHLORIDE BLD-SCNC: 100 MMOL/L (ref 99–110)
CHOLESTEROL, FASTING: 179 MG/DL (ref 0–199)
CO2: 21 MMOL/L (ref 21–32)
CREAT SERPL-MCNC: 0.6 MG/DL (ref 0.6–1.1)
EOSINOPHILS ABSOLUTE: 0.1 K/UL (ref 0–0.6)
EOSINOPHILS RELATIVE PERCENT: 1.5 %
GFR AFRICAN AMERICAN: >60
GFR NON-AFRICAN AMERICAN: >60
GLUCOSE BLD-MCNC: 117 MG/DL (ref 70–99)
HBA1C MFR BLD: 6.1 %
HCT VFR BLD CALC: 40.3 % (ref 36–48)
HDLC SERPL-MCNC: 33 MG/DL (ref 40–60)
HEMOGLOBIN: 13.2 G/DL (ref 12–16)
LDL CHOLESTEROL CALCULATED: 96 MG/DL
LYMPHOCYTES ABSOLUTE: 1.9 K/UL (ref 1–5.1)
LYMPHOCYTES RELATIVE PERCENT: 22.6 %
MCH RBC QN AUTO: 26.7 PG (ref 26–34)
MCHC RBC AUTO-ENTMCNC: 32.7 G/DL (ref 31–36)
MCV RBC AUTO: 81.7 FL (ref 80–100)
MONOCYTES ABSOLUTE: 0.6 K/UL (ref 0–1.3)
MONOCYTES RELATIVE PERCENT: 7.3 %
NEUTROPHILS ABSOLUTE: 5.8 K/UL (ref 1.7–7.7)
NEUTROPHILS RELATIVE PERCENT: 67.6 %
PDW BLD-RTO: 14.8 % (ref 12.4–15.4)
PLATELET # BLD: 344 K/UL (ref 135–450)
PMV BLD AUTO: 7.8 FL (ref 5–10.5)
POTASSIUM SERPL-SCNC: 3.8 MMOL/L (ref 3.5–5.1)
RBC # BLD: 4.93 M/UL (ref 4–5.2)
SODIUM BLD-SCNC: 138 MMOL/L (ref 136–145)
TOTAL PROTEIN: 7 G/DL (ref 6.4–8.2)
TRIGLYCERIDE, FASTING: 251 MG/DL (ref 0–150)
TSH REFLEX: 0.61 UIU/ML (ref 0.27–4.2)
VLDLC SERPL CALC-MCNC: 50 MG/DL
WBC # BLD: 8.5 K/UL (ref 4–11)

## 2022-04-18 PROCEDURE — 99395 PREV VISIT EST AGE 18-39: CPT | Performed by: FAMILY MEDICINE

## 2022-04-18 PROCEDURE — 83036 HEMOGLOBIN GLYCOSYLATED A1C: CPT | Performed by: FAMILY MEDICINE

## 2022-04-18 RX ORDER — LOSARTAN POTASSIUM 50 MG/1
TABLET ORAL
Qty: 90 TABLET | Refills: 1 | Status: SHIPPED | OUTPATIENT
Start: 2022-04-18

## 2022-04-18 SDOH — ECONOMIC STABILITY: FOOD INSECURITY: WITHIN THE PAST 12 MONTHS, YOU WORRIED THAT YOUR FOOD WOULD RUN OUT BEFORE YOU GOT MONEY TO BUY MORE.: NEVER TRUE

## 2022-04-18 SDOH — ECONOMIC STABILITY: FOOD INSECURITY: WITHIN THE PAST 12 MONTHS, THE FOOD YOU BOUGHT JUST DIDN'T LAST AND YOU DIDN'T HAVE MONEY TO GET MORE.: NEVER TRUE

## 2022-04-18 ASSESSMENT — ENCOUNTER SYMPTOMS
DIARRHEA: 0
ABDOMINAL PAIN: 0
SHORTNESS OF BREATH: 0
NAUSEA: 0
VOMITING: 0

## 2022-04-18 ASSESSMENT — PATIENT HEALTH QUESTIONNAIRE - PHQ9
SUM OF ALL RESPONSES TO PHQ QUESTIONS 1-9: 0
1. LITTLE INTEREST OR PLEASURE IN DOING THINGS: 0
2. FEELING DOWN, DEPRESSED OR HOPELESS: 0
SUM OF ALL RESPONSES TO PHQ QUESTIONS 1-9: 0
SUM OF ALL RESPONSES TO PHQ QUESTIONS 1-9: 0
SUM OF ALL RESPONSES TO PHQ9 QUESTIONS 1 & 2: 0
SUM OF ALL RESPONSES TO PHQ QUESTIONS 1-9: 0

## 2022-04-18 ASSESSMENT — SOCIAL DETERMINANTS OF HEALTH (SDOH): HOW HARD IS IT FOR YOU TO PAY FOR THE VERY BASICS LIKE FOOD, HOUSING, MEDICAL CARE, AND HEATING?: NOT HARD AT ALL

## 2022-04-18 NOTE — PATIENT INSTRUCTIONS
[facility]  [address]   APN:[phone]

## 2022-04-18 NOTE — PROGRESS NOTES
[address] [facility] [address] PRIMARY CARE  [address] [place] [address]  Dept: [phone]  Dept Fax: [phone]     4/18/2022      Kelvin Whiting   1985     Chief Complaint   Patient presents with    Annual Exam     fasting       HPI  Pt comes in today for routine physical.     Started weight watchers - Lost 17 lbs in the last 7 weeks. BP has been elevated at home. 130s/70-80s. Struggles with exercise due to knee pain. Last pap 2017. She is wanting to see a new OBGYN. IGT POC a1c = 6.1%. Wt Readings from Last 5 Encounters:   04/18/22 (!) 321 lb 12.8 oz (146 kg)   06/07/21 (!) 324 lb 9.6 oz (147.2 kg)   06/09/20 (!) 302 lb 0.5 oz (137 kg)   05/20/20 (!) 302 lb (137 kg)   03/11/20 294 lb 15.6 oz (133.8 kg)      BP Readings from Last 5 Encounters:   04/18/22 137/79   06/07/21 133/85   06/09/20 (!) 129/90   03/11/20 120/63   02/26/20 128/89        PHQ-9 Total Score: 0 (4/18/2022  8:10 AM)       Prior to Visit Medications    Medication Sig Taking? Authorizing Provider   chlorthalidone (HYGROTON) 25 MG tablet TAKE ONE TABLET BY MOUTH DAILY Yes JORDY Watt CNP   losartan (COZAAR) 25 MG tablet TAKE ONE TABLET BY MOUTH DAILY Yes JORDY Watt CNP        Past Medical History:   Diagnosis Date    Arthritis     left knee    Hyperlipidemia     Hypertension     Impaired fasting glucose     Obesity         Social History     Tobacco Use    Smoking status: Never Smoker    Smokeless tobacco: Never Used   Substance Use Topics    Alcohol use:  Yes     Alcohol/week: 2.0 standard drinks     Types: 1 Glasses of wine, 1 Cans of beer per week     Comment: once a week    Drug use: No        Past Surgical History:   Procedure Laterality Date    KNEE ARTHROSCOPY Right 05/2020    KNEE SURGERY Bilateral     patella realignment-2 on right knee and one on left    OTHER SURGICAL HISTORY N/A 03/30/2018    EGD with biopsy        No Known Allergies Family History   Problem Relation Age of Onset    High Blood Pressure Mother     Other Mother         migraine headaches    Arthritis Mother     Cancer Father         lymphoma    Other Father     High Blood Pressure Sister     Other Sister         migraine headaches    Arthritis Maternal Grandmother     Arthritis Maternal Grandfather     Cancer Paternal Grandmother         esophageal    Arthritis Paternal Grandmother     Arthritis Paternal Grandfather         Patient's past medical history, surgical history, family history, medications, and allergies  were all reviewed and updated as appropriate today. Review of Systems   Constitutional: Negative for chills, fever and unexpected weight change. Respiratory: Negative for shortness of breath. Cardiovascular: Negative for chest pain. Gastrointestinal: Negative for abdominal pain, diarrhea, nausea and vomiting. Musculoskeletal: Positive for arthralgias. /79 (Cuff Size: Large Adult)   Pulse 81   Temp 97.2 °F (36.2 °C) (Temporal)   Ht 5' 4\" (1.626 m)   Wt (!) 321 lb 12.8 oz (146 kg)   LMP 04/04/2022   SpO2 96% Comment: room air  Breastfeeding No   BMI 55.24 kg/m²      Physical Exam  Vitals reviewed. Constitutional:       General: She is not in acute distress. Appearance: Normal appearance. She is well-developed. HENT:      Right Ear: Tympanic membrane normal.      Left Ear: Tympanic membrane normal.   Eyes:      General: No scleral icterus. Conjunctiva/sclera: Conjunctivae normal.   Neck:      Thyroid: No thyromegaly. Cardiovascular:      Rate and Rhythm: Normal rate and regular rhythm. Heart sounds: No murmur heard. Pulmonary:      Effort: Pulmonary effort is normal. No respiratory distress. Breath sounds: Normal breath sounds. Abdominal:      General: Bowel sounds are normal. There is no distension. Palpations: Abdomen is soft. Tenderness: There is no abdominal tenderness. Musculoskeletal:      Cervical back: Neck supple. Right lower leg: No edema. Left lower leg: No edema. Lymphadenopathy:      Cervical: No cervical adenopathy. Skin:     General: Skin is warm and dry. Capillary Refill: Capillary refill takes less than 2 seconds. Neurological:      General: No focal deficit present. Mental Status: She is alert and oriented to person, place, and time. Mental status is at baseline. Psychiatric:         Mood and Affect: Mood normal.         Assessment:  Encounter Diagnoses   Name Primary?  Routine physical examination Yes    Impaired glucose tolerance     Essential hypertension     Mixed hyperlipidemia        Plan:    - Declines Tdap. - Will see GYN for pap. - BP uncontrolled. Dose increased. - Continue with weight loss efforts, low impact exercise. - Mammogram next year. - Fasting labs. New Prescriptions    No medications on file        Orders Placed This Encounter   Procedures    CBC with Auto Differential    Comprehensive Metabolic Panel    Lipid, Fasting    TSH with Reflex    POCT glycosylated hemoglobin (Hb A1C)        Return in about 6 months (around 10/18/2022) for Follow up HTN.           Christen Tubbs DO

## 2022-04-20 NOTE — RESULT ENCOUNTER NOTE
Your recent test results are all normal/stable.  Please don't hesitate to contact the office with any additional questions/concerns - Dr. Javier García

## 2022-05-05 ENCOUNTER — OFFICE VISIT (OUTPATIENT)
Dept: PRIMARY CARE CLINIC | Age: 37
End: 2022-05-05
Payer: COMMERCIAL

## 2022-05-05 VITALS
BODY MASS INDEX: 54.58 KG/M2 | DIASTOLIC BLOOD PRESSURE: 84 MMHG | SYSTOLIC BLOOD PRESSURE: 130 MMHG | WEIGHT: 293 LBS | TEMPERATURE: 96.8 F | HEART RATE: 76 BPM

## 2022-05-05 DIAGNOSIS — R05.8 POST-VIRAL COUGH SYNDROME: ICD-10-CM

## 2022-05-05 DIAGNOSIS — J06.9 VIRAL UPPER RESPIRATORY TRACT INFECTION: Primary | ICD-10-CM

## 2022-05-05 LAB
INFLUENZA A ANTIGEN, POC: NEGATIVE
INFLUENZA B ANTIGEN, POC: NEGATIVE

## 2022-05-05 PROCEDURE — 99213 OFFICE O/P EST LOW 20 MIN: CPT | Performed by: FAMILY MEDICINE

## 2022-05-05 PROCEDURE — 87804 INFLUENZA ASSAY W/OPTIC: CPT | Performed by: FAMILY MEDICINE

## 2022-05-05 RX ORDER — PREDNISONE 20 MG/1
20 TABLET ORAL DAILY
Qty: 5 TABLET | Refills: 0 | Status: SHIPPED | OUTPATIENT
Start: 2022-05-05 | End: 2022-05-10

## 2022-05-05 NOTE — PROGRESS NOTES
[address] [facility] [address] PRIMARY CARE  [address] [place] [address]  Dept: [phone]  Dept Fax: [phone]     5/5/2022      Danny Berumen   1985     Chief Complaint   Patient presents with    URI    Hypertension       HPI    Pt comes in today for recurrent URI sx. Mid April had URI for 2-3 days with lingering sore throat. COVID neg x 3. Symptoms recurred this past Friday. Sore throat, cough, congestion and fatigue. More severe than previous. Fever on Saturday but none since. COVID negative x 2. Took OTC mucinex DM with minimal relief. Still with congestion and lingering cough. Concerned that URI sx could be related to recent dose adjustment to Losartan. Prior to Visit Medications    Medication Sig Taking? Authorizing Provider   losartan (COZAAR) 50 MG tablet TAKE ONE TABLET BY MOUTH DAILY Yes Lisette Marcum,    chlorthalidone (HYGROTON) 25 MG tablet TAKE ONE TABLET BY MOUTH DAILY Yes Caesar Tilley APRN - CNP        Past Medical History:   Diagnosis Date    Arthritis     left knee    Hyperlipidemia     Hypertension     Impaired fasting glucose     Obesity         Social History     Tobacco Use    Smoking status: Never Smoker    Smokeless tobacco: Never Used   Substance Use Topics    Alcohol use:  Yes     Alcohol/week: 2.0 standard drinks     Types: 1 Glasses of wine, 1 Cans of beer per week     Comment: once a week    Drug use: No        Past Surgical History:   Procedure Laterality Date    KNEE ARTHROSCOPY Right 05/2020    KNEE SURGERY Bilateral     patella realignment-2 on right knee and one on left    OTHER SURGICAL HISTORY N/A 03/30/2018    EGD with biopsy        No Known Allergies     Family History   Problem Relation Age of Onset    High Blood Pressure Mother     Other Mother         migraine headaches    Arthritis Mother     Cancer Father         lymphoma    Other Father     High Blood Pressure Sister     Other Sister         migraine headaches    Arthritis Maternal Grandmother     Arthritis Maternal Grandfather     Cancer Paternal Grandmother         esophageal    Arthritis Paternal Grandmother     Arthritis Paternal Grandfather         Patient's past medical history, surgical history, family history, medications, and allergies  were all reviewed and updated as appropriate today. Review of Systems   Constitutional: Negative for fever. HENT: Positive for congestion. Respiratory: Positive for cough. Negative for shortness of breath and wheezing. Cardiovascular: Negative for chest pain, palpitations and leg swelling. /84 (Site: Left Upper Arm, Position: Sitting, Cuff Size: Medium Adult)   Pulse 76   Temp 96.8 °F (36 °C)   Wt (!) 318 lb (144.2 kg)   BMI 54.58 kg/m²      Physical Exam  Vitals reviewed. Constitutional:       General: She is not in acute distress. Appearance: She is well-developed. She is not ill-appearing or toxic-appearing. HENT:      Head: Normocephalic. Right Ear: Tympanic membrane and external ear normal.      Left Ear: Tympanic membrane and external ear normal.      Nose: No mucosal edema. Right Sinus: No maxillary sinus tenderness or frontal sinus tenderness. Left Sinus: No maxillary sinus tenderness or frontal sinus tenderness. Mouth/Throat:      Mouth: Mucous membranes are moist.      Pharynx: No oropharyngeal exudate or posterior oropharyngeal erythema. Eyes:      General: No scleral icterus. Conjunctiva/sclera: Conjunctivae normal.   Cardiovascular:      Rate and Rhythm: Normal rate and regular rhythm. Heart sounds: No murmur heard. Pulmonary:      Effort: Pulmonary effort is normal. No respiratory distress. Breath sounds: Normal breath sounds. Musculoskeletal:      Cervical back: Neck supple. Right lower leg: No edema. Left lower leg: No edema. Lymphadenopathy:      Cervical: No cervical adenopathy. Skin:     General: Skin is warm and dry. Capillary Refill: Capillary refill takes less than 2 seconds. Neurological:      Mental Status: She is alert and oriented to person, place, and time. Mental status is at baseline. Psychiatric:         Mood and Affect: Mood normal.         Assessment:  Encounter Diagnoses   Name Primary?  Viral upper respiratory tract infection Yes    Post-viral cough syndrome        Plan:    - Resolving URI with post viral cough. Recommended PO prednisone. Supportive care measures. Call if not resolving in 1-2 weeks. New Prescriptions    PREDNISONE (DELTASONE) 20 MG TABLET    Take 1 tablet by mouth daily for 5 days        Orders Placed This Encounter   Procedures    POCT Influenza A/B Antigen        Return if symptoms worsen or fail to improve. 20 total minutes were spent in direct patient care including chart review, face-to-face consultation and documentation.      Jayant Carmona, DO

## 2022-05-10 ASSESSMENT — ENCOUNTER SYMPTOMS
COUGH: 1
SHORTNESS OF BREATH: 0
WHEEZING: 0

## 2022-06-30 DIAGNOSIS — I10 ESSENTIAL HYPERTENSION: ICD-10-CM

## 2022-06-30 RX ORDER — CHLORTHALIDONE 25 MG/1
TABLET ORAL
Qty: 90 TABLET | Refills: 0 | Status: SHIPPED | OUTPATIENT
Start: 2022-06-30 | End: 2022-10-03

## 2022-10-01 DIAGNOSIS — I10 ESSENTIAL HYPERTENSION: ICD-10-CM

## 2022-10-03 RX ORDER — CHLORTHALIDONE 25 MG/1
TABLET ORAL
Qty: 90 TABLET | Refills: 0 | Status: SHIPPED | OUTPATIENT
Start: 2022-10-03

## 2022-10-03 NOTE — TELEPHONE ENCOUNTER
Medication:   Requested Prescriptions     Pending Prescriptions Disp Refills    chlorthalidone (HYGROTON) 25 MG tablet [Pharmacy Med Name: CHLORTHALIDONE 25 MG TABLET] 90 tablet 0     Sig: TAKE ONE TABLET BY MOUTH DAILY     Last Filled:  6/30/2022    Last appt: 5/5/2022   Next appt: 10/17/2022

## 2022-11-15 ENCOUNTER — OFFICE VISIT (OUTPATIENT)
Dept: PRIMARY CARE CLINIC | Age: 37
End: 2022-11-15
Payer: COMMERCIAL

## 2022-11-15 VITALS
TEMPERATURE: 98.3 F | BODY MASS INDEX: 55.44 KG/M2 | DIASTOLIC BLOOD PRESSURE: 77 MMHG | SYSTOLIC BLOOD PRESSURE: 117 MMHG | OXYGEN SATURATION: 97 % | WEIGHT: 293 LBS | HEART RATE: 93 BPM

## 2022-11-15 DIAGNOSIS — I10 ESSENTIAL HYPERTENSION: Primary | ICD-10-CM

## 2022-11-15 DIAGNOSIS — E66.01 OBESITY, MORBID, BMI 50 OR HIGHER (HCC): ICD-10-CM

## 2022-11-15 DIAGNOSIS — M17.11 PRIMARY OSTEOARTHRITIS OF RIGHT KNEE: ICD-10-CM

## 2022-11-15 DIAGNOSIS — R73.02 IMPAIRED GLUCOSE TOLERANCE: ICD-10-CM

## 2022-11-15 LAB — HBA1C MFR BLD: 6.2 %

## 2022-11-15 PROCEDURE — 3074F SYST BP LT 130 MM HG: CPT | Performed by: FAMILY MEDICINE

## 2022-11-15 PROCEDURE — 83036 HEMOGLOBIN GLYCOSYLATED A1C: CPT | Performed by: FAMILY MEDICINE

## 2022-11-15 PROCEDURE — 3078F DIAST BP <80 MM HG: CPT | Performed by: FAMILY MEDICINE

## 2022-11-15 PROCEDURE — 99213 OFFICE O/P EST LOW 20 MIN: CPT | Performed by: FAMILY MEDICINE

## 2022-11-15 RX ORDER — LOSARTAN POTASSIUM 50 MG/1
TABLET ORAL
Qty: 90 TABLET | Refills: 1 | Status: SHIPPED | OUTPATIENT
Start: 2022-11-15

## 2022-11-15 RX ORDER — CHLORTHALIDONE 25 MG/1
TABLET ORAL
Qty: 90 TABLET | Refills: 1 | Status: SHIPPED | OUTPATIENT
Start: 2022-11-15

## 2022-11-15 ASSESSMENT — ENCOUNTER SYMPTOMS
SHORTNESS OF BREATH: 0
COUGH: 0

## 2022-11-15 NOTE — PROGRESS NOTES
[address] [facility] [address] PRIMARY CARE  [address] [place] [address]  Dept: [phone]  Dept Fax: [phone]     11/15/2022      Christine Hernandez   1985     Chief Complaint   Patient presents with    Hypertension       HPI    Pt comes in today for follow up HTN and IGT. Wt Readings from Last 5 Encounters:   11/15/22 (!) 323 lb (146.5 kg)   05/05/22 (!) 318 lb (144.2 kg)   04/18/22 (!) 321 lb 12.8 oz (146 kg)   06/07/21 (!) 324 lb 9.6 oz (147.2 kg)   06/09/20 (!) 302 lb 0.5 oz (137 kg)      BP Readings from Last 5 Encounters:   11/15/22 117/77   05/05/22 130/84   04/18/22 137/79   06/07/21 133/85   06/09/20 (!) 129/90       Planning to see GYN in the near future for annual WWE. Would like new referral for ortho to follow up for chronic right knee pain. Last Ortho left the practice. She continues to struggle with her weight. Added situational stress seems to complicate these efforts. No data recorded     Prior to Visit Medications    Medication Sig Taking? Authorizing Provider   chlorthalidone (HYGROTON) 25 MG tablet TAKE ONE TABLET BY MOUTH DAILY  Lisette Marcum DO   losartan (COZAAR) 50 MG tablet TAKE ONE TABLET BY MOUTH DAILY  Lisette Guevara DO        Past Medical History:   Diagnosis Date    Arthritis     left knee    Hyperlipidemia     Hypertension     Impaired fasting glucose     Obesity         Social History     Tobacco Use    Smoking status: Never    Smokeless tobacco: Never   Substance Use Topics    Alcohol use:  Yes     Alcohol/week: 2.0 standard drinks     Types: 1 Glasses of wine, 1 Cans of beer per week     Comment: once a week    Drug use: No        Past Surgical History:   Procedure Laterality Date    KNEE ARTHROSCOPY Right 05/2020    KNEE SURGERY Bilateral     patella realignment-2 on right knee and one on left    OTHER SURGICAL HISTORY N/A 03/30/2018    EGD with biopsy        No Known Allergies     Family History Problem Relation Age of Onset    High Blood Pressure Mother     Other Mother         migraine headaches    Arthritis Mother     Cancer Father         lymphoma    Other Father     High Blood Pressure Sister     Other Sister         migraine headaches    Arthritis Maternal Grandmother     Arthritis Maternal Grandfather     Cancer Paternal Grandmother         esophageal    Arthritis Paternal Grandmother     Arthritis Paternal Grandfather         Patient's past medical history, surgical history, family history, medications, and allergies  were all reviewed and updated as appropriate today. Review of Systems   Constitutional:  Negative for appetite change and fever. Respiratory:  Negative for cough and shortness of breath. Psychiatric/Behavioral:  Negative for dysphoric mood. The patient is not nervous/anxious. /77   Pulse 93   Temp 98.3 °F (36.8 °C)   Wt (!) 323 lb (146.5 kg)   LMP  (LMP Unknown) Comment: End of October 2022  SpO2 97%   BMI 55.44 kg/m²      Physical Exam  Vitals reviewed. Constitutional:       General: She is not in acute distress. Appearance: Normal appearance. She is well-developed. She is not ill-appearing or toxic-appearing. Eyes:      General: No scleral icterus. Conjunctiva/sclera: Conjunctivae normal.   Neck:      Thyroid: No thyromegaly. Cardiovascular:      Rate and Rhythm: Normal rate and regular rhythm. Heart sounds: No murmur heard. Pulmonary:      Effort: Pulmonary effort is normal. No respiratory distress. Breath sounds: Normal breath sounds. Musculoskeletal:      Cervical back: Neck supple. Right lower leg: No edema. Left lower leg: No edema. Lymphadenopathy:      Cervical: No cervical adenopathy. Skin:     General: Skin is warm and dry. Capillary Refill: Capillary refill takes less than 2 seconds. Neurological:      General: No focal deficit present. Mental Status: She is alert. Mental status is at baseline. Psychiatric:         Mood and Affect: Mood normal.       Assessment:  Encounter Diagnoses   Name Primary? Essential hypertension Yes    Impaired glucose tolerance     Primary osteoarthritis of right knee     Obesity, morbid, BMI 50 or higher ([facility] [address]        Plan:    - BP well controlled. - POC a1c = 6.2%. - Discussed Ozempic as option for IGT/obesity. She is agreeable. Will send to pharmacy to see if insurance will approve.    - Ortho referral.     New Prescriptions    No medications on file        Orders Placed This Encounter   Procedures    Vikas Brewer MD, Orthopedic Surgery (Shoulder; Hip; Knee), [place]        Return in about 6 months (around 5/15/2023) for Physical.    Total time spent with patient including direct consultation, evaluation, review of medical records and documentation = 483 Jeremías Paris, DO

## 2022-11-17 RX ORDER — SEMAGLUTIDE 1.34 MG/ML
0.25 INJECTION, SOLUTION SUBCUTANEOUS WEEKLY
Qty: 1 ADJUSTABLE DOSE PRE-FILLED PEN SYRINGE | Refills: 0 | Status: SHIPPED | OUTPATIENT
Start: 2022-11-17 | End: 2022-11-17 | Stop reason: SDUPTHER

## 2022-11-17 RX ORDER — SEMAGLUTIDE 1.34 MG/ML
0.25 INJECTION, SOLUTION SUBCUTANEOUS WEEKLY
Qty: 1 ADJUSTABLE DOSE PRE-FILLED PEN SYRINGE | Refills: 0 | Status: SHIPPED | OUTPATIENT
Start: 2022-11-17

## 2022-11-21 ENCOUNTER — TELEPHONE (OUTPATIENT)
Dept: ORTHOPEDIC SURGERY | Age: 37
End: 2022-11-21

## 2022-11-21 ENCOUNTER — OFFICE VISIT (OUTPATIENT)
Dept: ORTHOPEDIC SURGERY | Age: 37
End: 2022-11-21
Payer: COMMERCIAL

## 2022-11-21 VITALS — BODY MASS INDEX: 50.02 KG/M2 | WEIGHT: 293 LBS | HEIGHT: 64 IN

## 2022-11-21 DIAGNOSIS — G89.29 CHRONIC PAIN OF RIGHT KNEE: ICD-10-CM

## 2022-11-21 DIAGNOSIS — M25.561 RIGHT KNEE PAIN, UNSPECIFIED CHRONICITY: ICD-10-CM

## 2022-11-21 DIAGNOSIS — M25.561 CHRONIC PAIN OF RIGHT KNEE: ICD-10-CM

## 2022-11-21 DIAGNOSIS — M17.11 PRIMARY OSTEOARTHRITIS OF RIGHT KNEE: Primary | ICD-10-CM

## 2022-11-21 PROCEDURE — 20610 DRAIN/INJ JOINT/BURSA W/O US: CPT | Performed by: STUDENT IN AN ORGANIZED HEALTH CARE EDUCATION/TRAINING PROGRAM

## 2022-11-21 PROCEDURE — 99204 OFFICE O/P NEW MOD 45 MIN: CPT | Performed by: STUDENT IN AN ORGANIZED HEALTH CARE EDUCATION/TRAINING PROGRAM

## 2022-11-21 RX ORDER — LIDOCAINE HYDROCHLORIDE 10 MG/ML
4 INJECTION, SOLUTION EPIDURAL; INFILTRATION; INTRACAUDAL; PERINEURAL ONCE
Status: COMPLETED | OUTPATIENT
Start: 2022-11-21 | End: 2022-11-21

## 2022-11-21 RX ADMIN — LIDOCAINE HYDROCHLORIDE 4 ML: 10 INJECTION, SOLUTION EPIDURAL; INFILTRATION; INTRACAUDAL; PERINEURAL at 09:43

## 2022-11-21 SDOH — HEALTH STABILITY: PHYSICAL HEALTH: ON AVERAGE, HOW MANY DAYS PER WEEK DO YOU ENGAGE IN MODERATE TO STRENUOUS EXERCISE (LIKE A BRISK WALK)?: 0 DAYS

## 2022-11-21 ASSESSMENT — SOCIAL DETERMINANTS OF HEALTH (SDOH)

## 2022-11-21 NOTE — TELEPHONE ENCOUNTER
Pt CALLED TO MAKE AQUATIC THERAPY APPT. THEY SAID THE ORDER DIDN'T SPEDIFICALLY MENTION \"AQUATIC THERAPY\" SO THEY SAID TO ASKFOR THE ORDER TO BE REDONE, TO MAKE CERTAIN THAT INSURANCE WILL COVER THE MODALITIES. MYCHART IS GOOD WAY TO LET Pt KNOW THAT THE ORDER HAS BEEN ADJUSTED.

## 2022-11-21 NOTE — LETTER
ZULEIMA FAY  [address] [facility] [address]  Phone: [phone]  Fax: [phone]    Nay Villanueva MD    November 21, 2022     Jarek Martinez, [address] [place] [address]    Patient: Merritt Du   MR Number: [id number]   YOB: 1985   Date of Visit: 11/21/2022       Dear Jarek Martinez: Thank you for referring Arlena Habermann to me for evaluation/treatment. Below are the relevant portions of my assessment and plan of care. If you have questions, please do not hesitate to call me. I look forward to following [facility] along with you.     Sincerely,      Nay Villanueva MD

## 2022-11-21 NOTE — PROGRESS NOTES
Dr oJrge Cardoso      Date /Time 11/21/2022       12:08 PM EST  Name Siddharth Patricio             1985   Location  Rebecca Hernandez  MRN [id number]                Chief Complaint   Patient presents with    Knee Pain     Right knee pain, sharp pain under patella and achy on the outside. Stairs make knee hurt the most.        History of Present Illness  Siddharth Patricio is a 42 y.o. female who presents with chronic right knee pain. Localized to the anteromedial aspect of the knee. 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 Of note she did have hardware removal of screws from the right knee but they were not removed from the left knee. Has had chronic bilateral knee pain right worse than left. She has previously followed with Dr. Bethel Lunsford for this. They have tried ibuprofen and steroid and gel injections. Steroid injections provided a few weeks of relief gel injections provided no relief. She has not done physical therapy yet. Regarding weight loss she is currently working with her primary care doctor and has recently been started on semaglutide. Past History  Past Medical History:   Diagnosis Date    Arthritis     left knee    Hyperlipidemia     Hypertension     Impaired fasting glucose     Obesity      Past Surgical History:   Procedure Laterality Date    KNEE ARTHROSCOPY Right 05/2020    KNEE SURGERY Bilateral     patella realignment-2 on right knee and one on left    OTHER SURGICAL HISTORY N/A 03/30/2018    EGD with biopsy     Social History     Tobacco Use    Smoking status: Never    Smokeless tobacco: Never   Substance Use Topics    Alcohol use:  Yes     Alcohol/week: 2.0 standard drinks     Types: 1 Glasses of wine, 1 Cans of beer per week     Comment: once a week      Current Outpatient Medications on File Prior to Visit   Medication Sig Dispense Refill    Semaglutide,0.25 or 0.5MG/DOS, (OZEMPIC, 0.25 OR 0.5 MG/DOSE,) 2 MG/1.5ML SOPN Inject 0.25 mg into the skin once a week 1 Adjustable Dose Pre-filled Pen Syringe 0    chlorthalidone (HYGROTON) 25 MG tablet TAKE ONE TABLET BY MOUTH DAILY 90 tablet 1    losartan (COZAAR) 50 MG tablet TAKE ONE TABLET BY MOUTH DAILY 90 tablet 1     No current facility-administered medications on file prior to visit. ASCVD 10-YEAR RISK SCORE  The ASCVD Risk score (Lebron DK, et al., 2019) failed to calculate for the following reasons: The 2019 ASCVD risk score is only valid for ages 36 to 78     Review of Systems  10-point ROS is negative other than HPI. Physical Exam  Ht 5' 4\" (1.626 m)   Wt (!) 323 lb (146.5 kg)   LMP 10/27/2022   BMI 55.44 kg/m²      Constitutional:       General: He is not in acute distress. Appearance: Normal appearance. Cardiovascular:      Rate and Rhythm: Normal rate and regular rhythm. Pulses: Normal pulses. Pulmonary:      Effort: Pulmonary effort is normal. No respiratory distress. Neurological:      Mental Status: He is alert and oriented to person, place, and time. Mental status is at baseline. Skin: Mean, dry, intact. No open sores  Lymphatics: No palpable lymph nodes    Musculoskeletal:  Gait:  antalgic    Cruz Hip: No pain or limitation to B hip IR    R Knee: Well-healed midline surgical scars and arthroscopic portals noted. She obtains full extension and 120 degrees flexion with some tightness in terminal flexion. There is no gross instability to varus valgus testing or AP instability. There is medial joint line and proximal medial tibial tenderness. No evidence of distal neurovascular compromise. Imaging  Multiple views of the right knee ordered obtained reviewed and interpreted, show Kellgren Damon grade 4 changes worse on the medial side. Osteophyte formation in all 3 compartments. Her medial joint space narrowing actually appears a lot worse than the x-rays on February 2020.     Procedure:    Risks benefits limitations and alternatives to right knee injection were discussed with patient who wished to proceed. Under aseptic technique 4ml 1% lidocaine and 40mg kenalog were injected into the knee without difficulty. There was no complications in the patient tolerated the procedure well. Administrations This Visit       lidocaine PF 1 % injection 4 mL       Admin Date  11/21/2022  09:43 Action  Given Dose  4 mL Route  IntraDERmal Site  Knee Right Administered By  Romina Rhodes MA    Ordering Provider: Jimenez Shrestha MD    [address]: [id number]    : HOSPIRA    Patient Supplied?: No              triamcinolone acetonide (KENALOG) injection 10 mg       Admin Date  11/21/2022  09:43 Action  Given Dose  10 mg Route  Intra-artICUlar Site   Administered By  Romina Rhodes MA    Ordering Provider: Jimenez Shrestha MD    NDC: 9038-8255-51    : [facility] (PRIMARY CARE)    Patient Supplied?: No                         Orders Placed This Encounter   Procedures    W[place] ARTHROCENTESIS ASPIR&/INJ MAJOR JT/BURSA W/O US       Assessment and Plan  Patt Hartmann was seen today for knee pain. Diagnoses and all orders for this visit:    Primary osteoarthritis of right knee  -     W[place] ARTHROCENTESIS ASPIR&/INJ MAJOR JT/BURSA W/O US    Right knee pain, unspecified chronicity  -     Cancel: XR KNEE RIGHT (MIN 4 VIEWS); Future  -     [place] ARTHROCENTESIS ASPIR&/INJ MAJOR JT/BURSA W/O US    Chronic pain of right knee  -     [place] ARTHROCENTESIS ASPIR&/INJ MAJOR JT/BURSA W/O US    Other orders  -     triamcinolone acetonide (KENALOG) injection 10 mg  -     lidocaine PF 1 % injection 4 mL        77-year-old female with bilateral knee osteoarthritis status post bilateral patellar realignment surgeries as a child. Despite her young age she is having significant disability and pain from her knee arthritis specifically on the right side.   She works as a  and having a lot of issues at work because she often has to use stairs and walk a lot at the properties. She has failed conservative measures including anti-inflammatory steroid and gel injections and with her significant degenerative changes advised her that knee replacement is the only thing I expect will provide her significantly improved function and pain relief. We did perform a steroid injection today. The 2 main considerations with proceeding with a right knee replacement are her age and obesity. We discussed that while we cannot control her age we cannot control her weight. She is already working with her primary care doctor Dr. Yee Marinelli with a new medication, which is supposed to help with weight loss over the course of the next few months. I would also like her to start physical therapy with aquatic therapy modalities for total knee prehab and weight loss. Plan to follow-up in early January to assess progress with weight loss. As long as things are going in the right direction and she has been able to lose some weight I think it is reasonable to schedule knee replacement after that with the hopes that she will be able to continue to lose weight after having her knee replaced. Electronically signed by Jimenez Shrestha MD on 68/53/0463 at 12:08 PM  This dictation was generated by voice recognition computer software. Although all attempts are made to edit the dictation for accuracy, there may be errors in the transcription that are not intended. Total time spent reviewing past notes, imaging, labs, clinical exam, and treatment plan was > 45 min.

## 2022-11-21 NOTE — TELEPHONE ENCOUNTER
Order mentioned Aquatic Therapy in referral, Added it again in the comment section also.    I will MyChart message patient

## 2022-11-23 ENCOUNTER — TELEPHONE (OUTPATIENT)
Dept: ADMINISTRATIVE | Age: 37
End: 2022-11-23

## 2022-11-23 NOTE — TELEPHONE ENCOUNTER
Submitted PA for [facility]  [address] (Key: [id number] STATUS: PENDING

## 2022-11-28 NOTE — TELEPHONE ENCOUNTER
CMM shows that the PA for Ozempic (0.25 or 0.5 MG/DOSE) 2MG/1.5ML pen-injectors was DENIED. Denial letter has not been received.

## 2022-11-30 NOTE — TELEPHONE ENCOUNTER
Tagging onto the [employer] started on 11.17.22 I informed patient of the denial. Asked her if she has checked into any co-pay cards for the product.   Will close this note and await patient's mychart response

## 2022-12-01 DIAGNOSIS — I10 ESSENTIAL HYPERTENSION: ICD-10-CM

## 2022-12-01 RX ORDER — LOSARTAN POTASSIUM 50 MG/1
TABLET ORAL
Qty: 30 TABLET | OUTPATIENT
Start: 2022-12-01

## 2022-12-01 NOTE — TELEPHONE ENCOUNTER
LAST SEEN       NEXT APPOINTMENT       LAST FILL    11.15.22  4.19.23.  11.1.22   DENIED the losartan, confirmed with pharmacy they have the 11/15/22 refill.

## 2022-12-08 ENCOUNTER — TELEPHONE (OUTPATIENT)
Dept: ORTHOPEDIC SURGERY | Age: 37
End: 2022-12-08

## 2022-12-12 NOTE — TELEPHONE ENCOUNTER
Per 11.23.22 note we were waiting on patient to tell us if she had looked into Ozempic co-pay cards. No response will mychart her this today.

## 2022-12-20 ENCOUNTER — HOSPITAL ENCOUNTER (OUTPATIENT)
Dept: PHYSICAL THERAPY | Age: 37
Setting detail: THERAPIES SERIES
Discharge: HOME | End: 2022-12-20
Payer: COMMERCIAL

## 2022-12-20 PROCEDURE — 97530 THERAPEUTIC ACTIVITIES: CPT

## 2022-12-20 PROCEDURE — 97161 PT EVAL LOW COMPLEX 20 MIN: CPT

## 2022-12-20 NOTE — FLOWSHEET NOTE
Physical Therapy Aquatic Flow Sheet  Date:  2022    Patient Name:  Lyssa Allan    :  1985  MRN: [id number]  Medical Diagnosis:  Primary osteoarthritis of right knee [M17.11]  Treatment Diagnosis:  weakness, pain  Onset Date:  s/p 2 years                 Referral Date: 2022   Referring Provider: Bobo Kc MD   Insurance Provider: Heather  Restrictions/Precautions:    none  Plan of care signed (Y/N):  sent  Visit# / total visits:     Pain level: /10   Electronically signed by:  Ricardo Phillip, PT, PT    Medicare Cap (if applicable):  NA = total amount used, updated 2022    Key  B= Belt DB= Dumbells T= Theratube   H= Hydrotone N= Noodles W= Weights   P= Paddles S= Speedo equipment K= Kickboard     Exercises/Activities   Warm-up/Amb    Exercises      Slow forward   nv  HR/TR  nv    Slow sideways  nv  Marches  nv    Slow backwards  nv  Mini-squats  nv    Medium forward    4-way SLR  nv    Medium sideways    Hip circles/fig 8  nv    Small shuffle    Hamstring curls  nv    Jog    Knee extension  nv    Braiding    Pelvic tilts  nv    Bicycling  nv  Scap squeezes          Shoulder flex/ext      Functional    Shoulder abd/add      Step  nv  Shoulder H. abd/add      Lifting    Shoulder IR/ER      Hand to opp knee    Rowing      Push down squat    Bilateral pull down      UE PNF    Push/pull      LE PNF    Push downs      Wall push ups    Arm circles      SLS  nv  Elbow flex/ext          Chin tuck      Stretching    UT shrugs/rolls      Gastroc/Soleus    Rocking horse      Hamstring          SKTC    Other      Piriformis          Hip flexor          Ladder pull          Pec stretch          Post deltoid           Time In:      Timed Code Treatment Minutes:       Total Treatment Minutes:      Treatment/Activity Tolerance:   [] Patient tolerated treatment well [] Patient limited by fatigue   [] Patient limited by pain [] Patient limited by other medical complications  [] Other: Prognosis: [] Good [] Fair  [] Poor    Patient Requires Follow-up:  [] Yes  [] No    Plan: [] Continue per plan of care [] Alter current plan (see comments)   [] Plan of care initiated [] Hold pending MD visit [] Discharge    See Weekly Progress Note: [] Yes  [] No  Next due:

## 2022-12-20 NOTE — FLOWSHEET NOTE
Sima  [facility] [facility], [facility] [address] [place]   Phone: [phone]  Fax [phone]    Physical Therapy Daily Treatment Note    Date:  2022    Patient Name:  Odilon Miranda    :  1985  MRN: [id number]  Medical Diagnosis:  Primary osteoarthritis of right knee [M17.11]  Treatment Diagnosis:  weakness, pain  Onset Date:  s/p 2 years                 Referral Date: 2022   Referring Provider: Rosalind Forbes MD   Insurance Provider: Heather  Restrictions/Precautions:    none  Plan of care signed (Y/N):  sent  Visit# / total visits:       G-Code (if applicable):          LEFS 46/80    Medicare Cap (if applicable):  NA = total amount used, updated 2022    Time in:   1:30      Timed Treatment: 10  Total Treatment Time:  45  ________________________________________________________________________________________    Pain Level:    /10  SUBJECTIVE:  see eval    OBJECTIVE:     Exercise/Equipment Resistance/Repetitions Other comments                                                                                                                                             Other Therapeutic Activities:  Pt given tour of [facility] aquatic facilities. Pt instructed in use of locker room and aquatic facilities. Also instructed in rules and regulations of pool.     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

## 2022-12-20 NOTE — THERAPY EVALUATION
Sima  77. and Therapy, [facility], [address], [address]  Phone: [phone]  Fax [phone]    Dear Referring Practitioner: Dr. Peggy Torres,     We had the pleasure of evaluating the following patient for physical therapy services at [facility]. A summary of our findings can be found in the initial assessment below. This includes our plan of care. If you have any questions or concerns regarding these findings, please do not hesitate to contact me at the office phone number. Thank you for the referral.         Physician Signature:_______________________________Date:__________________  By signing above (or electronic signature), therapists plan is approved by physician        LOWER EXTREMITY PHYSICAL THERAPY EVALUATION      Evaluation Date: 12/20/2022    Patient Name: Miri Morrissey   YOB: 1985    Medical Diagnosis:  Primary osteoarthritis of right knee [M17.11]  Treatment Diagnosis:  weakness, pain  Onset Date:  s/p 2 years    Referral Date: 12/20/2022   Referring Provider: Johana Adhikari MD   Insurance Provider: Heather  Restrictions/Precautions:    none    SUBJECTIVE FINDINGS    History of Present Illness:  Pt presents with c/o R knee pain. Notes that the MD is recommending a TKR. Notes that she has pretty severe arthritis. Notes that she has had 3 previous knee surgeries on the R knee. Most of her pain is on the inside underneath the patella. Also reports swelling and stiffness. Does note a decrease in mobility - some day she cannot bend of straighten all the way. Also reports numbness at lateral knee from previous surgery. Pain does not typically radiate. Pt reports that she can walk for about 20-25 minutes - increase in swelling and pain. Sitting is most comfortable but has to change positions. Usually sleeps well at night.  Has to do a lot of steps at work - going down is the worst.  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  Worsened by walking, standing, steps    Improved by sitting, rest   Pt's sleep is affected? []   Yes [x]   No  []   N/A      OBJECTIVE FINDINGS       Gait/Steps/Balance    []   [facility] [x]    Dysfunction Noted.    Comment: WBOS, waddling sequence with lateral sway, femoral ambulation patterns     []  All balance WFL unless otherwise noted below:  Single limb stance: Trendelenburg BLE  Squat: good glut activation, slight anterior tibial translation      Sensation/Motor Function   [x] All dermatomes WNL except as marked below   [x] All  myotomes WNL except as marked below      Dermatome Left Right   Anterior groin, 2-3 inches below ASIS (L1-L2)     Middle third anterior thigh (L3)     Patella and med malleolus (L4)     Fibular head and dorsum of foot (L5)     Lateral side and plantar surface of foot (S1)     Medial aspect of posterior thigh (S2)     Perianal area (S3,4)            Range of Motion/Strength Testing     [x] All ROM and strength WNL except as marked below   * Denotes limitation by pain    Range Tested AROM MMT/Resisted    Left Right Left Right   Hip Flexion   4 4-   Hip Extension   3+ 3+   Hip Abduction   3+ 3+   Hip ER   3+ 3+   Knee Flexion 2-0-128 2-123 5 5   Knee Extension   5 5   Ankle Dorsiflex   5 5     Flexibility     [x] All flexibility WNL except as marked below  Muscle Left Right   Hamstrings (90/90) [x]  WNL  [] Tight  [] NT [x]  WNL  [] Tight  [] NT   Gastroc [x]  WNL  [] Tight  [] NT [x]  WNL  [] Tight  [] NT   TFL/ITB (Lisa) []  WNL  [] Tight  [] NT []  WNL  [] Tight  [] NT   Iliopsoas (Jordan) []  WNL  [] Tight  [] NT []  WNL  [] Tight  [] NT Piriformis []  WNL  [] Tight  [] NT []  WNL  [] Tight  [] NT       Reflexes/Trunk Strength    [x] All WNL except as marked below     Reflex Left Right Strength Strength   Quadriceps (L3,4)   Transv Abdominis 3-/5   Achilles (S1,2)       Ankle clonus - -     Babinski - -         ASSESSMENT  Pt is a 41 y/o presenting to physical therapy with c/o R knee pain. Thorough evaluation and examination, identified findings consistent with altered muscle firing patterns of R glut complex. Poor functional movement patterns during SLS and gait. PT recommending skilled aquatic PT in order to address goals. Body Structures, Functions, Activity Limitations Requiring Skilled Therapeutic Intervention: Decreased functional mobility ,Decreased ADL status,Decreased ROM,Decreased body mechanics,Decreased strength,Decreased balance,Increased pain     Statement of Medical Necessity: Physical Therapy is both indicated and medically necessary as outlined in the POC to increase the likelihood of meeting the functionally related goals stated below. Eval Complexity:    Decision Making: LOW Complexity    PLAN OF CARE    Frequency: 2x/wk for 6 weeks  Current Treatment Recommendations: Therapeutic exercise, therapeutic activity, manual therapy, gait training, neuromuscular re-education    FUNCTIONAL OUTCOME MEASURE  LEFS 46/80    GOALS  Short term goal 1: Pt will tolerated 30 minutes of aquatic PT  Short term goal 2: Pt will decrease pain 25%  Short term goal 3: Pt will increase B hip strength to 4/5  Short term goal 4: Pt will ambulate with increased glut activation B  Short term goal 5: Pt will return to completing stairs for work without limitation    Thank you for the referral of this patient.      Time In:  1330  Time Out: 1415  Timed Code Treatment Minutes:  10  minutes            Total Treatment Time: 45  minutes      Grover France PT DPT  license #[id number]

## 2022-12-22 ENCOUNTER — HOSPITAL ENCOUNTER (OUTPATIENT)
Dept: PHYSICAL THERAPY | Age: 37
Setting detail: THERAPIES SERIES
Discharge: HOME | End: 2022-12-22
Payer: COMMERCIAL

## 2022-12-22 PROCEDURE — 97150 GROUP THERAPEUTIC PROCEDURES: CPT

## 2022-12-22 PROCEDURE — 97113 AQUATIC THERAPY/EXERCISES: CPT

## 2022-12-22 NOTE — FLOWSHEET NOTE
Physical Therapy Aquatic Flow Sheet  Date:  2022    Patient Name:  Maria Dolores Shaw    :  1985  MRN: [id number]  Medical Diagnosis:  Primary osteoarthritis of right knee [M17.11]  Treatment Diagnosis:  weakness, pain  Onset Date:  s/p 2 years                 Referral Date: 2022   Referring Provider: Maria Ortega MD   Insurance Provider: Heather  Restrictions/Precautions:    none  Plan of care signed (Y/N):  sent  Visit# / total visits:     Pain level: 6/10   Electronically signed by:  Dunia Arizmendi, PT, PT    Medicare Cap (if applicable):  NA = total amount used, updated 2022    Key  B= Belt DB= Dumbells T= Theratube   H= Hydrotone N= Noodles W= Weights   P= Paddles S= Speedo equipment K= Kickboard     Exercises/Activities   Warm-up/Amb    Exercises      Slow forward  X2 laps  HR/TR  x15    Slow sideways  X2 laps  Marches  x2 mins    Slow backwards  X2 laps  Mini-squats  x10    Medium forward    4-way SLR  x15    Medium sideways    Hip circles/fig 8  x15    Small shuffle    Hamstring curls  x10    Jog    Knee extension  x10    Braiding    Pelvic tilts  10x5 secs     Bicycling  X2 mins  Scap squeezes          Shoulder flex/ext      Functional    Shoulder abd/add      Step  nv  Shoulder H. abd/add      Lifting    Shoulder IR/ER      Hand to opp knee    Rowing      Push down squat    Bilateral pull down      UE PNF    Push/pull      LE PNF    Push downs      Wall push ups    Arm circles      SLS  nv  Elbow flex/ext          Chin tuck      Stretching    UT shrugs/rolls      Gastroc/Soleus  2x30 secs B  Rocking horse      Hamstring  2x30 secs B        SKTC    Other      Piriformis          Hip flexor          Ladder pull          Pec stretch          Post deltoid           Time In:  2:00    Timed Code Treatment Minutes:  15    Total Treatment Minutes:  35    Treatment/Activity Tolerance:   [x] Patient tolerated treatment well [] Patient limited by fatigue   [] Patient limited by pain [] Patient limited by other medical complications  [] Other:     Prognosis: [x] Good [] Fair  [] Poor    Patient Requires Follow-up:  [x] Yes  [] No    Plan: [x] Continue per plan of care [] Alter current plan (see comments)   [] Plan of care initiated [] Hold pending MD visit [] Discharge    See Weekly Progress Note: [] Yes  [x] No  Next due:

## 2022-12-27 ENCOUNTER — HOSPITAL ENCOUNTER (OUTPATIENT)
Dept: PHYSICAL THERAPY | Age: 37
Setting detail: THERAPIES SERIES
Discharge: HOME | End: 2022-12-27
Payer: COMMERCIAL

## 2022-12-27 PROCEDURE — 97150 GROUP THERAPEUTIC PROCEDURES: CPT

## 2022-12-27 PROCEDURE — 97113 AQUATIC THERAPY/EXERCISES: CPT

## 2022-12-29 ENCOUNTER — HOSPITAL ENCOUNTER (OUTPATIENT)
Dept: PHYSICAL THERAPY | Age: 37
Setting detail: THERAPIES SERIES
Discharge: HOME | End: 2022-12-29
Payer: COMMERCIAL

## 2022-12-29 PROCEDURE — 97150 GROUP THERAPEUTIC PROCEDURES: CPT

## 2022-12-29 PROCEDURE — 97113 AQUATIC THERAPY/EXERCISES: CPT

## 2022-12-29 NOTE — FLOWSHEET NOTE
[address] [place] [address] and Therapy[address]  Phone: [phone]  Fax [phone]      Physical Therapy Aquatic Flow Sheet  Date:  2022    Patient Name:  Desire Carpenter \"Lesa\" Anais Adorno    :  1985  MRN: [id number]  Medical Diagnosis:  Primary osteoarthritis of right knee [M17.11]  Treatment Diagnosis:  weakness, pain  Onset Date:  s/p 2 years                 Referral Date: 2022   Referring Provider: Earl Woodward MD   Insurance Provider: Heather  Restrictions/Precautions:    none  Plan of care signed (Y/N):  sent  Visit# / total visits:   exp 2023   Pain level: 5/10   Electronically signed by:  Jacob Emery PT, DPT    Medicare Cap (if applicable):  NA = total amount used, updated 2022    Key  B= Belt DB= Dumbells T= Theratube   H= Hydrotone N= Noodles W= Weights   P= Paddles S= Speedo equipment K= Kickboard     Exercises/Activities   Warm-up/Amb    Exercises      Slow forward  X2 laps  HR/TR  x20    Slow sideways  X2 laps  Marches  x2 mins    Slow backwards  X2 laps  Mini-squats  x20    Medium forward    4-way SLR  x20    Medium sideways    Hip circles/fig 8  x20    Small shuffle    Hamstring curls  x20    Jog    Knee extension  x20    Braiding    Pelvic tilts  10x5 secs     Bicycling  X2 mins  Scap squeezes          Shoulder flex/ext  1x10B w/ SLS + TA    Functional    Shoulder abd/add  1x10B w/ SLS + TA    Step  10x B  Shoulder H. abd/add  1x10B w/ SLS + TA    Lifting    Shoulder IR/ER      Hand to opp knee    Rowing      Push down squat    Bilateral pull down      UE PNF    Push/pull      LE PNF    Push downs      Wall push ups    Arm circles      SLS  2x30\" B  Elbow flex/ext          Chin tuck      Stretching    UT shrugs/rolls      Gastroc/Soleus  2x30 secs B  Rocking horse      Hamstring  2x30 secs B        SKTC    Other      Piriformis    ER into wall [id number] hold B    Hip flexor Ladder pull          Pec stretch          Post deltoid           Time In:  2:00pm    Timed Code Treatment Minutes:  15min    Total Treatment Minutes:  50min (1 aquatic TE individual, 1 group)    Treatment/Activity Tolerance:   [x] Patient tolerated treatment well [] Patient limited by fatigue   [] Patient limited by pain [] Patient limited by other medical complications  [] Other:     Prognosis: [x] Good [] Fair  [] Poor    Patient Requires Follow-up:  [x] Yes  [] No    Plan: [x] Continue per plan of care [] Alter current plan (see comments)   [] Plan of care initiated [] Hold pending MD visit [] Discharge    See Weekly Progress Note: [] Yes  [x] No  Next due:

## 2023-01-03 ENCOUNTER — HOSPITAL ENCOUNTER (OUTPATIENT)
Dept: PHYSICAL THERAPY | Age: 38
Setting detail: THERAPIES SERIES
Discharge: HOME | End: 2023-01-03
Payer: COMMERCIAL

## 2023-01-03 PROCEDURE — 97113 AQUATIC THERAPY/EXERCISES: CPT

## 2023-01-03 PROCEDURE — 97150 GROUP THERAPEUTIC PROCEDURES: CPT

## 2023-01-03 NOTE — FLOWSHEET NOTE
Sima  77. and Therapy, [address]   Phone: [phone]  Fax [phone]      Physical Therapy Aquatic Flow Sheet  Date:  1/3/2023    Patient Name:  Kae Mcleod \"Lesa\" Ana Lilia Stokes    :  1985  MRN: [id number]  Medical Diagnosis:  Primary osteoarthritis of right knee [M17.11]  Treatment Diagnosis:  weakness, pain  Onset Date:  s/p 2 years                 Referral Date: 2022   Referring Provider: Donald Trinidad MD   Insurance Provider: Heather  Restrictions/Precautions:    none  Plan of care signed (Y/N):  sent  Visit# / total visits:   exp 2023   Pain level: 5/10   Electronically signed by:  Jeanine Matthews, PT, DPT    Medicare Cap (if applicable):  NA = total amount used, updated 1/3/2023    Key  B= Belt DB= Dumbells T= Theratube   H= Hydrotone N= Noodles W= Weights   P= Paddles S= Speedo equipment K= Kickboard     Exercises/Activities   Warm-up/Amb    Exercises      Slow forward  X2 laps  HR/TR  x20    Slow sideways  X2 laps  Marches  x2 mins    Slow backwards  X2 laps  Mini-squats  x20    Medium forward    4-way SLR  x20    Medium sideways    Hip circles/fig 8  x20    Small shuffle    Hamstring curls  x20    Jog    Knee extension  x20    Braiding    Pelvic tilts  10x5 secs     Bicycling  X2 mins  Scap squeezes          Shoulder flex/ext  1x10B w/ SLS + TA    Functional    Shoulder abd/add  1x10B w/ SLS + TA    Step  10x B  Shoulder H. abd/add  1x10B w/ SLS + TA    Lifting    Shoulder IR/ER      Hand to opp knee    Rowing      Push down squat    Bilateral pull down      UE PNF    Push/pull      LE PNF    Push downs      Wall push ups    Arm circles      SLS  2x30\" B  Elbow flex/ext          Chin tuck      Stretching    UT shrugs/rolls      Gastroc/Soleus  2x30 secs B  Rocking horse      Hamstring  2x30 secs B        SKTC    Other      Piriformis    ER into wall  10x5\" B    Hip flexor    Hip ext into wall  10x5\" B    Ladder pull          Pec stretch          Post deltoid           Time In:  2:00pm    Timed Code Treatment Minutes:  15min    Total Treatment Minutes:  50min (1 aquatic TE individual, 1 group)    Treatment/Activity Tolerance:   [x] Patient tolerated treatment well [] Patient limited by fatigue   [] Patient limited by pain [] Patient limited by other medical complications  [] Other:     Prognosis: [x] Good [] Fair  [] Poor    Patient Requires Follow-up:  [x] Yes  [] No    Plan: [x] Continue per plan of care [] Alter current plan (see comments)   [] Plan of care initiated [] Hold pending MD visit [] Discharge    See Weekly Progress Note: [] Yes  [x] No  Next due:

## 2023-01-05 ENCOUNTER — HOSPITAL ENCOUNTER (OUTPATIENT)
Dept: PHYSICAL THERAPY | Age: 38
Setting detail: THERAPIES SERIES
Discharge: HOME | End: 2023-01-05
Payer: COMMERCIAL

## 2023-01-05 PROCEDURE — 97150 GROUP THERAPEUTIC PROCEDURES: CPT

## 2023-01-05 PROCEDURE — 97113 AQUATIC THERAPY/EXERCISES: CPT

## 2023-01-05 NOTE — FLOWSHEET NOTE
Sima  77. and Therapy, [facility], [address] [place] [address]  Phone: [phone]  Fax [phone]      Physical Therapy Aquatic Flow Sheet  Date:  2023    Patient Name:  Augustine Robledo \"Lesa\" Ricardo Vazquez    :  1985  MRN: [id number]  Medical Diagnosis:  Primary osteoarthritis of right knee [M17.11]  Treatment Diagnosis:  weakness, pain  Onset Date:  s/p 2 years                 Referral Date: 2022   Referring Provider: Ethan Bill MD   Insurance Provider: Heather  Restrictions/Precautions:    none  Plan of care signed (Y/N):  sent  Visit# / total visits:   exp 2023   Pain level: 4-5/10   Electronically signed by:  Louise Cunha PT, DPT    Medicare Cap (if applicable):  NA = total amount used, updated 2023    Key  B= Belt DB= Dumbells T= Theratube   H= Hydrotone N= Noodles W= Weights   P= Paddles S= Speedo equipment K= Kickboard     Exercises/Activities   Warm-up/Amb    Exercises      Slow forward  X2 laps  HR/TR  x25    Slow sideways  X2 laps  Marches  x2 mins    Slow backwards  X2 laps  Mini-squats  x25    Medium forward    4-way SLR  x25    Medium sideways    Hip circles/fig 8  x25    Small shuffle    Hamstring curls  x25    Jog    Knee extension  x25    Braiding    Pelvic tilts  10x5 secs     Bicycling  X2 mins  Scap squeezes          Shoulder flex/ext  2x20B w/ SLS + TA    Functional    Shoulder abd/add  2x20B w/ SLS + TA    Step  10x B  Shoulder H. abd/add  2x20B w/ SLS + TA    Lifting    Shoulder IR/ER      Hand to opp knee    Rowing      Push down squat    Bilateral pull down      UE PNF    Push/pull      LE PNF    Push downs      Wall push ups    Arm circles      SLS   Elbow flex/ext          Chin tuck      Stretching    UT shrugs/rolls      Gastroc/Soleus  2x30 secs B  Rocking horse      Hamstring  2x30 secs B        SKTC    Other      Piriformis    ER into wall  10x5\" B    Hip flexor    Hip ext into wall  10x5\" B    Ladder pull          Pec stretch          Post deltoid           Time In:  2:05pm    Timed Code Treatment Minutes:  15min    Total Treatment Minutes:  50min (1 aquatic TE individual, 1 group)    Treatment/Activity Tolerance:   [x] Patient tolerated treatment well [] Patient limited by fatigue   [] Patient limited by pain [] Patient limited by other medical complications  [] Other:     Prognosis: [x] Good [] Fair  [] Poor    Patient Requires Follow-up:  [x] Yes  [] No    Plan: [x] Continue per plan of care [] Alter current plan (see comments)   [] Plan of care initiated [] Hold pending MD visit [] Discharge    See Weekly Progress Note: [] Yes  [x] No  Next due:

## 2023-01-10 ENCOUNTER — HOSPITAL ENCOUNTER (OUTPATIENT)
Dept: PHYSICAL THERAPY | Age: 38
Setting detail: THERAPIES SERIES
Discharge: HOME | End: 2023-01-10
Payer: COMMERCIAL

## 2023-01-10 PROCEDURE — 97110 THERAPEUTIC EXERCISES: CPT | Performed by: PHYSICAL THERAPIST

## 2023-01-10 PROCEDURE — 97113 AQUATIC THERAPY/EXERCISES: CPT

## 2023-01-10 PROCEDURE — 97150 GROUP THERAPEUTIC PROCEDURES: CPT

## 2023-01-10 PROCEDURE — 97164 PT RE-EVAL EST PLAN CARE: CPT | Performed by: PHYSICAL THERAPIST

## 2023-01-10 NOTE — FLOWSHEET NOTE
Sima  77. and Therapy, [facility], [address], [address]  Phone: [phone]  Fax [phone]    Physical Therapy Re-certification/Re-evaluation      Dear Referring Practitioner: Dr. Peggy Torres,     We had the pleasure of evaluating the following patient for physical therapy services at [facility]. A summary of our findings can be found in the initial assessment below. This includes our plan of care. If you have any questions or concerns regarding these findings, please do not hesitate to contact me at the office phone number. Thank you for the referral.         Physician Signature:_______________________________Date:__________________  By signing above (or electronic signature), therapists plan is approved by physician    Date:  1/10/2023    Patient Name:  Miri Morrissey    :  1985  MRN: [id number]  Medical Diagnosis:  Primary osteoarthritis of right knee [M17.11]  Treatment Diagnosis:  weakness, pain  Onset Date:  s/p 2 years                 Referral Date: 2022   Referring Provider: Johana Adhikari MD   Insurance Provider: Heather  Restrictions/Precautions:    none  Plan of care signed (Y/N):  sent  Visit# / total visits:       ______________________________________________________________________________________    Pain Level:    5/10  SUBJECTIVE:  Pain hasn't improved much but does feel stronger. Steps remain a challenge. Long days of work (walking/standing) create pain at night.     OBJECTIVE:     Exercise/Equipment Resistance/Repetitions Other comments            Quad sets 5\" x10    SLR 2x5      Clamshells x10      Bridging x10                                                                                                                  Other Therapeutic Activities:     Manual Treatments:     NA    Modalities:  NA    Test/Measurements:      LEFS Score (%): 48/80 (60%) Range Tested AROM MMT/Resisted     Left Right Left Right   Hip Flexion     4+ 4+   Hip Extension     4 4   Hip Abduction     4- 4-   Hip ER     4 4   Knee Flexion   5 5   Knee Extension     5 5   Ankle Dorsiflex             ASSESSMENT:    Patient has shown increase in strength and reports more tolerance of ADLs. Reports being able to exercise in the pool without pain and soreness. Treatment/Activity Tolerance:   [x]Patient tolerated treatment well [] Patient limited by fatique  []Patient limited by pain [] Patient limited by other medical complications  [] Other:     Goals:    Short term goal 1: Pt will tolerated 30 minutes of aquatic PT Met  Short term goal 2: Pt will decrease pain 25% Not met  Short term goal 3: Pt will increase B hip strength to 4/5 Met  Short term goal 4: Pt will ambulate with increased glut activation B Met  Short term goal 5: Pt will return to completing stairs for work without limitation Not Met    New Goals:     Short Term goal 1: Pt will be Independent with HEP    Long Term goal 1: Pt will decrease pain 25%   Long Term goal 2: Pt will return to completing stairs for work without limitation  Long Term goal 3: Pt will be able to finish work shift without increase in symptoms    Plan: [x] Continue per plan of care [] Alter current plan (see comments)   [] Plan of care initiated [] Hold pending MD visit [] Discharge      Plan for Next Session:  Continue aquatic PT 2x week for 6 weeks. Timed Code Treatment Minutes: 35   Total Treatment Minutes: 35      If Medicare: NA  If C: NA     CPT Code # CPT Code #     [] Eval Low (12996)     [] Gait (12388)      [] Eval Medium (59731)   [] VASO (46119)      [] Eval High (01647)    [] Estim Unattended (96668)      [x] Re-Eval (43541)  1  [] [facility] (66973)      [x] Therex (55529)   1  [] Dry Needle 1-2 muscle (65173)      [] Neuromusc.  Re-ed (50770)    [] Dry Needle 3+ muscle (30318)      [] Manual (31792)   [] Group Therapy      [] Ther. Act (47798)    []         Re-Certification Due Date:         Signature:  Aramis Garibay, PT

## 2023-01-10 NOTE — FLOWSHEET NOTE
Sima  77. and Therapy, [facility], [address] [place] [address] Dr  Phone: [phone]  Fax [phone]      Physical Therapy Aquatic Flow Sheet  Date:  1/10/2023    Patient Name:  Siria Guzman \"Lesa\" Venida Bloch    :  1985  MRN: [id number]  Medical Diagnosis:  Primary osteoarthritis of right knee [M17.11]  Treatment Diagnosis:  weakness, pain  Onset Date:  s/p 2 years                 Referral Date: 2022   Referring Provider: Joy Lopez MD   Insurance Provider: Heather  Restrictions/Precautions:    none  Plan of care signed (Y/N):  sent  Visit# / total visits:   exp 2023   Pain level: 4-5/10   Electronically signed by:  Sarah Tran PT, DPT    Medicare Cap (if applicable):  NA = total amount used, updated 1/10/2023    Key  B= Belt DB= Dumbells T= Theratube   H= Hydrotone N= Noodles W= Weights   P= Paddles S= Speedo equipment K= Kickboard     Exercises/Activities   Warm-up/Amb    Exercises      Slow forward  X2 laps  HR/TR  x25    Slow sideways  X2 laps  Marches  x2 mins    Slow backwards  X2 laps  Mini-squats  x25    Medium forward    4-way SLR  x25    Medium sideways    Hip circles/fig 8  x25    Small shuffle    Hamstring curls  x25    Jog    Knee extension  x25    Braiding    Pelvic tilts  10x5 secs     Bicycling  X2 mins  Scap squeezes          Shoulder flex/ext  2x20B w/ SLS + TA    Functional    Shoulder abd/add  2x20B w/ SLS + TA    Step  10x B  Shoulder H. abd/add  2x20B w/ SLS + TA    Lifting    Shoulder IR/ER      Hand to opp knee    Rowing      Push down squat    Bilateral pull down      UE PNF    Push/pull      LE PNF    Push downs      Wall push ups    Arm circles      SLS   Elbow flex/ext          Chin tuck      Stretching    UT shrugs/rolls      Gastroc/Soleus  2x30 secs B  Rocking horse      Hamstring  2x30 secs B        SKTC    Other      Piriformis    ER into wall  10x5\" B    Hip flexor    Hip ext into wall  10x5\" B    Ladder pull          Pec stretch          Post deltoid           Time In:  2:30pm    Timed Code Treatment Minutes:  15min    Total Treatment Minutes:  50min (1 aquatic TE individual, 1 group)    Treatment/Activity Tolerance:   [x] Patient tolerated treatment well [] Patient limited by fatigue   [] Patient limited by pain [] Patient limited by other medical complications  [] Other:     Prognosis: [x] Good [] Fair  [] Poor    Patient Requires Follow-up:  [x] Yes  [] No    Plan: [x] Continue per plan of care [] Alter current plan (see comments)   [] Plan of care initiated [] Hold pending MD visit [] Discharge    See Weekly Progress Note: [] Yes  [x] No  Next due:

## 2023-01-11 ENCOUNTER — OFFICE VISIT (OUTPATIENT)
Dept: ORTHOPEDIC SURGERY | Age: 38
End: 2023-01-11

## 2023-01-11 VITALS — HEIGHT: 64 IN | BODY MASS INDEX: 50.02 KG/M2 | WEIGHT: 293 LBS

## 2023-01-11 DIAGNOSIS — M17.11 OSTEOARTHRITIS OF RIGHT KNEE, UNSPECIFIED OSTEOARTHRITIS TYPE: Primary | ICD-10-CM

## 2023-01-11 NOTE — LETTER
Please contact [facility] Orthopaedic Nurse Navigator with any questions or concerns after your discharge. Mon- Fri Quique [phone]. If you have any issues or concerns after 5pm or on the weekend please call your surgeon's office. I will be contacting you in a few days to follow up. If you need a pain medication refill please contact your surgeon's office. Dr. Juan Alberto Carson Total Knee Replacement  Discharge Instructions       Activity: The first week after surgery is all about Ice, Elevation and Rest!        McGrann way to elevate knee above heart, while keeping the knee straight.     o Pillows should be placed under the FOOT and leg, such that the leg is held straight.   o You should feel stretching in the back of the knee. If there is too much pain add pillows under the knee, but the leg should be as straight as possible  o Placing pillows under the knee only and keeping the knee bent will make it very difficult to get it straight with physical therapy. o Use a walker when ambulating.  Physical therapy.    o Typically starts 10-14 days after surgery unless otherwise instructed  o Referral placed to outpatient location discussed with surgeon or for home PT if you do not have transportation for outpatient PT     To prevent Clot formation, you have been placed on the following medication:  o *** (ASA 81mg BID x 20 days or Xarelto 10mg Daily if BMI >45 or risk factors)     Surgical Site Care:  o Keep dressing in place until follow up visit, Call the office if drainage  o Showering is permitted with waterproof Mepilex dressing   o Will remove dressing at first follow up appointment     Pain Medications  o First and foremost you should take Tylenol and Celebrex as prescribed for baseline pain control  - If you have medical reasons not to take either medicine they were not prescribed  o You were also given Oxycodone as needed  - This for is pain despite the other medications and is typically needed for the first 3-5 days after surgery  - Be sure to drink plenty of fluids (recommend water) while taking narcotic pain medications to prevent constipation  o  You may take an over the counter laxative or stool softener as needed to prevent/treat constipation as well, we recommend Senokot S OTC. We recommend that you consider taking these medications the entire time you are taking pain medication.  Cold packs/Ice packs/Machine  o May be used as much as necessary to control swelling/inflammation/soreness. o This is typically, approximately 20 minutes per hour     Contact [facility]'s office if  o Increased redness, swelling, drainage of any kind as well as new onset fevers and or chills. These could signify an infection. o Calf or thigh tenderness to touch as well as increased swelling or redness. This could signify a clot formation. o Any rash appears, increased  or new onset nausea/vomiting occur. This may indicate a reaction to a medication. Brent Leyva Phone # [phone]. [address] and [facility].  Follow up with Surgeon at scheduled appointment time.

## 2023-01-11 NOTE — LETTER
[facility]  Surgery Scheduling Form:    23     DEMOGRAPHICS    Patient Name:  Basil Hutchinson  Patient :  1985   Patient SS#:  [id number]    Patient Phone:  [phone] (home)  Alt. Patient Phone:    Patient Address:  [address] [address]    PCP:  Artemio Kang DO  Insurance:  Payor: Ronald Stallworth / Plan: Ronald Stallworth - [place] PPO / Product Type: *No Product type* /   Insurance ID Number:  Payer/Plan Subscr  Sex Relation Sub. Ins. ID Effective Group Num   1. [id number] Anay Deutsch -* ADAL HERNANDEZ* 1985 Female Self [id number]                                   [id number]       DIAGNOSIS & PROCEDURE    Diagnosis:   M17.11 Primary Osteoarthritis    Operation: RIGHT  67382 Total Knee Arthroplasty    Provider:  Dario Dumont MD      Location:  [facility] INFORMATION    Requested Date:  ASAP, per pt   Requested Time:  any        OR Time Required:  120 Minutes  Admission:  []Outpatient   []23 hour  [x]Same Day Admit:   1-2  days  []Inpatient    Anesthesia:  [x]General  []Spinal  []MAC/Sedation  Regional Anesthesia:  []None  []Lumbar Plexus Block  []Fascia Iliaca  []Femoral  [x]Adductor canal  []Interscalene Block  []Insert Catheter  []OrthoMix []Exparel   [x] iPAC       EQUIPMENT    Position:  [x]Supine  []Lateral  []Beach-chair  []Prone    OR Bed:  [x]Regular  [x]Shipman boot  []Fitz    Radiology:  []Large C-arm  []Small C-arm  []Portable X-ray    Implants:  [employer]ha [employer] Knee:  [x]Primary Set  []Revision Set      CEMENTLESS TKA      [x][employer]ha [employer]:  Raghavendra Ngo [phone], Luis [email or web address]. [email or web address]  []Medacta: Pina Leija [phone], [email or web address]  []Fx Shoulder: Mary Méndez [phone], [email or web address]  []Selena: Alejandra Marcano [phone], Lisa Montes. [email or web address]. Bruce Diaz MD

## 2023-01-11 NOTE — PROGRESS NOTES
History: This is a 77-year-old female known to me for treatment of severe right patellofemoral arthritis and left severe left knee arthritis. At last visit we did steroid injection and I recommended rehab to attempt to lose some weight and improve conditioning ahead of anticipated right knee replacement. Regarding her weight management she was evaluated by Dr. Sunni Perrin for possible Ozempic therapy but unfortunately this was denied by insurance. She has started aquatic therapy and completed 7 sessions and notes improved strength to the right and left lower extremities. She continues to have severe disabling right knee pain and notes that the steroid injection provided no relief. She is currently taking ibuprofen as needed as well with minimal relief. Exam:      R Knee: Well-healed midline surgical scars and arthroscopic portals noted. She obtains full extension and 100 degrees flexion with tightness in terminal flexion. There is no gross instability to varus valgus testing or AP instability. There is medial joint line and proximal medial tibial tenderness. No evidence of distal neurovascular compromise. Imaging: Previous x-rays again reviewed which show KL grade 4 osteoarthritis especially with patellofemoral involvement on the right knee. The left knee has KL grade 4 varus osteoarthritis. Retained hardware from previous tibial tubercle transfer and left knee. Right knee hardware has been removed. Assessment: 77-year-old female with end-stage right greater than left osteoarthritis and has exhausted nonoperative treatment measures. Plan: Planning to proceed with right TKA. Planning to do this with cementless fixation with the hope of long-term durable fixation. She will see her PCP and get routine preop lab work and we will schedule her at our earliest mutual convenience.       Danitza Sharma MD

## 2023-01-12 ENCOUNTER — TELEPHONE (OUTPATIENT)
Dept: ORTHOPEDIC SURGERY | Age: 38
End: 2023-01-12

## 2023-01-12 ENCOUNTER — HOSPITAL ENCOUNTER (OUTPATIENT)
Dept: PHYSICAL THERAPY | Age: 38
Setting detail: THERAPIES SERIES
Discharge: HOME | End: 2023-01-12
Payer: COMMERCIAL

## 2023-01-12 NOTE — FLOWSHEET NOTE
Sima  77. and Therapy, [facility], [facility]  [place], [address]  Phone: [phone]  Fax [phone]      Physical Therapy  Cancellation/No-show Note  Patient Name:  Tenisha Berkowitz  :  1985   Date:  2023  Cancels to date: 0  No-shows to date: 1    For today's appointment patient:  [] Cancelled  [] Rescheduled appointment  [x] No-show     Reason given by patient:  [] Patient ill  [] Conflicting appointment  [] No transportation    [] Conflict with work  [] No reason given  [] Other:     Comments:      Electronically signed by:  Astrid El PT

## 2023-01-13 NOTE — PROGRESS NOTES
Rachel Conn    Age 42 y.o.    female    1985    MRN [id number]    2/7/2023  Arrival Time_____________  OR Time____________[address]     Procedure(s):  RIGHT TOTAL KNEE ARTHROPLASTY            ANGEL BIOMET NOTE: ADDUCTOR CANAL BLOCK, IPAC                      General   Surgeon(s):  Nicki Pipes, MD      DAY ADMIT ___  SDS/OP ___  OUTPT IN BED ___        Phone [phone] (home)     PCP _____________________ Phone_________________ Epic ( ) Epic CE ( ) Appt ________    ADDITIONAL INFO __________________________________ Cardio/Consult _____________    NOTES _____________________________________________________________________    ____________________________________________________________________________    PAT APPT DATE:________ TIME: ________  FAXED QAD: _______  (__) H&P w/ Hospitalist  __________________________________________________________________________  Preop Nurse phone screen complete: _____________  (__) CBC     (__) W/ DIFF ___________     (__) Hgb A1C    ___________  (__) CHEST X RAY   __________  (__) LIPID PROFILE  ___________  (__) EKG   __________  (__) PT/PTT   ___________  (__) PFT's   __________  (__) BMP   ___________  (__) CAROTIDS  __________  (__) CMP   ___________  (__) VEIN MAPPING  __________  (__) U/A   ___________  (__) HISTORY & PHYSICAL __________  (__) URINE C & S  ___________  (__) CARDIAC CLEARANCE __________  (__) U/A W/ FLEX  ___________  (__) PULM.  CLEARANCE __________  (__) SERUM PREGNANCY ___________  (__) Check Epic DOS orders __________  (__) TYPE & SCREEN __________repeat ( ) (__)  __________________ __________  (__) ALBUMIN   ___________  (__)  __________________ __________  (__) TRANSFERRIN  ___________  (__)  __________________ __________  (__) LIVER PROFILE  ___________  (__)  __________________ __________  (__) MRSA NASAL SWAB ___________  (__) URINE PREG DOS __________  (__) SED RATE  ___________  (__) BLOOD SUGAR DOS __________  (__) C-REACTIVE PROTEIN ___________    (__) VITAMIN D HYDROXY ___________  (__) BLOOD THINNERS __________    (__) ACE/ ARBS: _____________________     (__) BETABLOCKERS __________________

## 2023-01-17 ENCOUNTER — HOSPITAL ENCOUNTER (OUTPATIENT)
Dept: PHYSICAL THERAPY | Age: 38
Setting detail: THERAPIES SERIES
End: 2023-01-17
Payer: COMMERCIAL

## 2023-01-17 ENCOUNTER — APPOINTMENT (OUTPATIENT)
Dept: PHYSICAL THERAPY | Age: 38
End: 2023-01-17
Payer: COMMERCIAL

## 2023-01-17 ENCOUNTER — OFFICE VISIT (OUTPATIENT)
Dept: PRIMARY CARE CLINIC | Age: 38
End: 2023-01-17
Payer: COMMERCIAL

## 2023-01-17 VITALS
BODY MASS INDEX: 50.02 KG/M2 | SYSTOLIC BLOOD PRESSURE: 116 MMHG | TEMPERATURE: 99.2 F | WEIGHT: 293 LBS | HEART RATE: 93 BPM | DIASTOLIC BLOOD PRESSURE: 79 MMHG | OXYGEN SATURATION: 96 % | HEIGHT: 64 IN

## 2023-01-17 DIAGNOSIS — M17.11 PRIMARY OSTEOARTHRITIS OF RIGHT KNEE: Primary | ICD-10-CM

## 2023-01-17 DIAGNOSIS — R73.02 IMPAIRED GLUCOSE TOLERANCE: ICD-10-CM

## 2023-01-17 DIAGNOSIS — Z01.818 PRE-OP EXAM: ICD-10-CM

## 2023-01-17 DIAGNOSIS — Z12.31 ENCOUNTER FOR SCREENING MAMMOGRAM FOR MALIGNANT NEOPLASM OF BREAST: ICD-10-CM

## 2023-01-17 PROCEDURE — 99213 OFFICE O/P EST LOW 20 MIN: CPT | Performed by: FAMILY MEDICINE

## 2023-01-17 PROCEDURE — 3078F DIAST BP <80 MM HG: CPT | Performed by: FAMILY MEDICINE

## 2023-01-17 PROCEDURE — 3074F SYST BP LT 130 MM HG: CPT | Performed by: FAMILY MEDICINE

## 2023-01-17 ASSESSMENT — PATIENT HEALTH QUESTIONNAIRE - PHQ9
SUM OF ALL RESPONSES TO PHQ9 QUESTIONS 1 & 2: 0
SUM OF ALL RESPONSES TO PHQ QUESTIONS 1-9: 0
2. FEELING DOWN, DEPRESSED OR HOPELESS: 0
1. LITTLE INTEREST OR PLEASURE IN DOING THINGS: 0
SUM OF ALL RESPONSES TO PHQ QUESTIONS 1-9: 0

## 2023-01-17 ASSESSMENT — ENCOUNTER SYMPTOMS
COUGH: 0
SHORTNESS OF BREATH: 0

## 2023-01-17 NOTE — PROGRESS NOTES
[address] [facility] [address] PRIMARY CARE  [address] [place] [address]  Dept: [phone]  Dept Fax: [phone]     1/17/2023      Nila Duran   1985     Chief Complaint   Patient presents with    Pre-op Exam     R KNEE  ARTHROPLASTY    2/7/2023       HPI    Pt comes in today for pre-op exam.    Diagnosis: OA right knee    Planned surgery: Right total knee arthroplasty    Date scheduled: 2/7/23    Anesthesia: General    Blood thinner/ASA/NSAIDs: None    H/o anesthesia complications: None    Pre-operative testing: Ordered per surgeon    H/o cardiac or pulmonary disease: None    Functional capacity: 5-7  ( <4 MET = Self care, ADLs, walking indoors, 3-5 MET = Light work around the house, climb stairs or walk up hill, 5-7 METs = Heavy cleaning around the house, moderate yard work, run a short distance, 8-10 METs = Moderate to strenuous recreational sports, daily or prolonged exercise)     Wt Readings from Last 5 Encounters:   01/17/23 (!) 322 lb 12.8 oz (146.4 kg)   01/11/23 (!) 323 lb (146.5 kg)   11/21/22 (!) 323 lb (146.5 kg)   11/15/22 (!) 323 lb (146.5 kg)   05/05/22 (!) 318 lb (144.2 kg)      BP Readings from Last 5 Encounters:   01/17/23 116/79   11/15/22 117/77   05/05/22 130/84   04/18/22 137/79   06/07/21 133/85         PHQ-9 Total Score: 0 (1/17/2023  8:26 AM)     IGT - A1c 6.2%    Prior to Visit Medications    Medication Sig Taking? Authorizing Provider   chlorthalidone (HYGROTON) 25 MG tablet TAKE ONE TABLET BY MOUTH DAILY  Lisette Marcum DO   losartan (COZAAR) 50 MG tablet TAKE ONE TABLET BY MOUTH DAILY  Lisette Eric DO        Past Medical History:   Diagnosis Date    Arthritis     left knee    Hyperlipidemia     Hypertension     Impaired fasting glucose     Obesity         Social History     Tobacco Use    Smoking status: Never    Smokeless tobacco: Never   Substance Use Topics    Alcohol use:  Yes     Alcohol/week: 2.0 standard drinks Types: 1 Glasses of wine, 1 Cans of beer per week     Comment: once a week    Drug use: No        Past Surgical History:   Procedure Laterality Date    KNEE ARTHROSCOPY Right 05/2020    KNEE SURGERY Bilateral     patella realignment-2 on right knee and one on left    OTHER SURGICAL HISTORY N/A 03/30/2018    EGD with biopsy        No Known Allergies     Family History   Problem Relation Age of Onset    High Blood Pressure Mother     Other Mother         migraine headaches    Arthritis Mother     Cancer Father         lymphoma    Other Father     High Blood Pressure Sister     Other Sister         migraine headaches    Arthritis Maternal Grandmother     Arthritis Maternal Grandfather     Cancer Paternal Grandmother         esophageal    Arthritis Paternal Grandmother     Arthritis Paternal Grandfather         Patient's past medical history, surgical history, family history, medications, and allergies  were all reviewed and updated as appropriate today. Review of Systems   Respiratory:  Negative for cough and shortness of breath. Cardiovascular:  Negative for chest pain, palpitations and leg swelling. Neurological:  Negative for dizziness, syncope, weakness and headaches. /79   Pulse 93   Temp 99.2 °F (37.3 °C)   Ht 5' 3.75\" (1.619 m)   Wt (!) 322 lb 12.8 oz (146.4 kg)   LMP  (LMP Unknown)   SpO2 96%   BMI 55.84 kg/m²      Physical Exam  Vitals reviewed. Constitutional:       General: She is not in acute distress. Appearance: Normal appearance. She is well-developed. She is not ill-appearing or toxic-appearing. Eyes:      General: No scleral icterus. Conjunctiva/sclera: Conjunctivae normal.   Neck:      Thyroid: No thyromegaly. Cardiovascular:      Rate and Rhythm: Normal rate and regular rhythm. Heart sounds: No murmur heard. Pulmonary:      Effort: Pulmonary effort is normal. No respiratory distress. Breath sounds: Normal breath sounds.    Abdominal:      General: There is no distension. Palpations: Abdomen is soft. Tenderness: There is no abdominal tenderness. Musculoskeletal:      Cervical back: Neck supple. Right lower leg: No edema. Left lower leg: No edema. Lymphadenopathy:      Cervical: No cervical adenopathy. Skin:     General: Skin is warm and dry. Capillary Refill: Capillary refill takes less than 2 seconds. Neurological:      General: No focal deficit present. Mental Status: She is alert. Mental status is at baseline. Psychiatric:         Mood and Affect: Mood normal.       Assessment:  Encounter Diagnoses   Name Primary? Primary osteoarthritis of right knee Yes    Pre-op exam     Impaired glucose tolerance     Encounter for screening mammogram for malignant neoplasm of breast        Plan:    - Chronic conditions stable. Low risk to proceed with planned surgery. A1c at goal.   - Mammogram ordered for after her birthday. - Pre-op note sent to surgeon. PAT already ordered. New Prescriptions    No medications on file        Orders Placed This Encounter   Procedures    TRESA DIGITAL SCREEN W OR WO CAD BILATERAL        Return for As scheduled - physical in April.          DO Emanuel

## 2023-01-18 LAB
A/G RATIO: 1.5 (ref 1.1–2.2)
ALBUMIN SERPL-MCNC: 4 G/DL (ref 3.4–5)
ALP BLD-CCNC: 68 U/L (ref 40–129)
ALT SERPL-CCNC: 17 U/L (ref 10–40)
ANION GAP SERPL CALCULATED.3IONS-SCNC: 15 MMOL/L (ref 3–16)
APTT: 32.4 SEC (ref 23–34.3)
AST SERPL-CCNC: 18 U/L (ref 15–37)
BASOPHILS ABSOLUTE: 0 K/UL (ref 0–0.2)
BASOPHILS RELATIVE PERCENT: 0.3 %
BILIRUB SERPL-MCNC: 0.3 MG/DL (ref 0–1)
BUN BLDV-MCNC: 14 MG/DL (ref 7–20)
CALCIUM SERPL-MCNC: 9.2 MG/DL (ref 8.3–10.6)
CHLORIDE BLD-SCNC: 100 MMOL/L (ref 99–110)
CO2: 21 MMOL/L (ref 21–32)
CREAT SERPL-MCNC: 0.7 MG/DL (ref 0.6–1.1)
EOSINOPHILS ABSOLUTE: 0.1 K/UL (ref 0–0.6)
EOSINOPHILS RELATIVE PERCENT: 1.3 %
GFR SERPL CREATININE-BSD FRML MDRD: >60 ML/MIN/{1.73_M2}
GLUCOSE BLD-MCNC: 125 MG/DL (ref 70–99)
HCT VFR BLD CALC: 39.5 % (ref 36–48)
HEMOGLOBIN: 13 G/DL (ref 12–16)
INR BLD: 1.04 (ref 0.87–1.14)
LYMPHOCYTES ABSOLUTE: 1.8 K/UL (ref 1–5.1)
LYMPHOCYTES RELATIVE PERCENT: 23.5 %
MCH RBC QN AUTO: 26.9 PG (ref 26–34)
MCHC RBC AUTO-ENTMCNC: 32.9 G/DL (ref 31–36)
MCV RBC AUTO: 81.8 FL (ref 80–100)
MONOCYTES ABSOLUTE: 0.6 K/UL (ref 0–1.3)
MONOCYTES RELATIVE PERCENT: 7.5 %
NEUTROPHILS ABSOLUTE: 5.3 K/UL (ref 1.7–7.7)
NEUTROPHILS RELATIVE PERCENT: 67.4 %
PDW BLD-RTO: 14.1 % (ref 12.4–15.4)
PLATELET # BLD: 320 K/UL (ref 135–450)
PMV BLD AUTO: 8 FL (ref 5–10.5)
POTASSIUM SERPL-SCNC: 3.7 MMOL/L (ref 3.5–5.1)
PREALBUMIN: 20.5 MG/DL (ref 20–40)
PROTHROMBIN TIME: 13.5 SEC (ref 11.7–14.5)
RBC # BLD: 4.83 M/UL (ref 4–5.2)
SODIUM BLD-SCNC: 136 MMOL/L (ref 136–145)
TOTAL PROTEIN: 6.6 G/DL (ref 6.4–8.2)
TRANSFERRIN: 267 MG/DL (ref 200–360)
VITAMIN D 25-HYDROXY: 26 NG/ML
WBC # BLD: 7.8 K/UL (ref 4–11)

## 2023-01-19 ENCOUNTER — APPOINTMENT (OUTPATIENT)
Dept: PHYSICAL THERAPY | Age: 38
End: 2023-01-19
Payer: COMMERCIAL

## 2023-01-19 LAB
ESTIMATED AVERAGE GLUCOSE: 134.1 MG/DL
HBA1C MFR BLD: 6.3 %
MRSA SCREEN RT-PCR: NORMAL
URINE CULTURE, ROUTINE: NORMAL

## 2023-01-23 ENCOUNTER — TELEPHONE (OUTPATIENT)
Dept: PRIMARY CARE CLINIC | Age: 38
End: 2023-01-23

## 2023-01-23 ENCOUNTER — TELEMEDICINE (OUTPATIENT)
Dept: PRIMARY CARE CLINIC | Age: 38
End: 2023-01-23
Payer: COMMERCIAL

## 2023-01-23 ENCOUNTER — TELEPHONE (OUTPATIENT)
Dept: ORTHOPEDIC SURGERY | Age: 38
End: 2023-01-23

## 2023-01-23 DIAGNOSIS — J02.9 SORE THROAT: ICD-10-CM

## 2023-01-23 DIAGNOSIS — R50.9 SUBJECTIVE FEVER: ICD-10-CM

## 2023-01-23 DIAGNOSIS — Z01.818 PRE-OP EXAM: ICD-10-CM

## 2023-01-23 DIAGNOSIS — J02.0 STREP THROAT: Primary | ICD-10-CM

## 2023-01-23 DIAGNOSIS — M79.10 MYALGIA: ICD-10-CM

## 2023-01-23 LAB
Lab: NORMAL
QC PASS/FAIL: NORMAL
SARS-COV-2 RDRP RESP QL NAA+PROBE: NEGATIVE

## 2023-01-23 PROCEDURE — 87635 SARS-COV-2 COVID-19 AMP PRB: CPT | Performed by: FAMILY MEDICINE

## 2023-01-23 PROCEDURE — 93000 ELECTROCARDIOGRAM COMPLETE: CPT | Performed by: FAMILY MEDICINE

## 2023-01-23 PROCEDURE — 99214 OFFICE O/P EST MOD 30 MIN: CPT | Performed by: FAMILY MEDICINE

## 2023-01-23 RX ORDER — AMOXICILLIN 500 MG/1
500 CAPSULE ORAL 2 TIMES DAILY
Qty: 20 CAPSULE | Refills: 0 | Status: SHIPPED | OUTPATIENT
Start: 2023-01-23 | End: 2023-02-02

## 2023-01-23 NOTE — TELEPHONE ENCOUNTER
Myah from [facility] calling for pt's EKG to be faxed to her at fax number [phone] like pt is due to come in to the office tomorrow for this

## 2023-01-23 NOTE — TELEPHONE ENCOUNTER
Auth: # [id number]     Date: 02/07/23 thru 04/07/23  Type of SX:  OBSERVATION  Location: [facility]  CPT: 41956   DX Code: M17.11  SX area: Rt knee  Insurance: [employer]

## 2023-01-23 NOTE — PROGRESS NOTES
[address] [facility] [address] PRIMARY CARE  [address] [place] [address]  Dept: [phone]  Dept Fax: [phone]     1/23/2023      Miri Mercer   1985     Chief Complaint   Patient presents with    Illness       HPI  Pt encounter today completed virtual visit using [employer] Me platform. Services were provided through a video synchronous discussion virtually to substitute for in-person clinic visit. Patient and provider were located at their individual homes. Pt, routinely seen by Dr Harpal Morrison, requested VV today for acute illness. New onset symptoms yesterday with fever, sore throat/swelling and bodyaches with ear pressure. Since onset taken some OTC meds. No known sick contacts. Home COVID test last night. No major issues with strep. PHQ Scores 1/17/2023 4/18/2022 3/4/2021 10/17/2019   PHQ2 Score 0 0 0 0   PHQ9 Score 0 0 0 0     Interpretation of Total Score Depression Severity: 1-4 = Minimal depression, 5-9 = Mild depression, 10-14 = Moderate depression, 15-19 = Moderately severe depression, 20-27 = Severe depression     Prior to Visit Medications    Medication Sig Taking? Authorizing Provider   chlorthalidone (HYGROTON) 25 MG tablet TAKE ONE TABLET BY MOUTH DAILY Yes Lisette Marcum DO   losartan (COZAAR) 50 MG tablet TAKE ONE TABLET BY MOUTH DAILY Yes Юлия Albarado DO       Past Medical History:   Diagnosis Date    Arthritis     left knee    Hyperlipidemia     Hypertension     Impaired fasting glucose     Obesity         Social History     Tobacco Use    Smoking status: Never    Smokeless tobacco: Never   Substance Use Topics    Alcohol use:  Yes     Alcohol/week: 2.0 standard drinks     Types: 1 Glasses of wine, 1 Cans of beer per week     Comment: once a week    Drug use: No        Past Surgical History:   Procedure Laterality Date    KNEE ARTHROSCOPY Right 05/2020    KNEE SURGERY Bilateral     patella realignment-2 on right knee and one on left    OTHER SURGICAL HISTORY N/A 03/30/2018    EGD with biopsy        No Known Allergies     Family History   Problem Relation Age of Onset    High Blood Pressure Mother     Other Mother         migraine headaches    Arthritis Mother     Cancer Father         lymphoma    Other Father     High Blood Pressure Sister     Other Sister         migraine headaches    Arthritis Maternal Grandmother     Arthritis Maternal Grandfather     Cancer Paternal Grandmother         esophageal    Arthritis Paternal Grandmother     Arthritis Paternal Grandfather         Patient's past medical history, surgical history, family history, medications, and allergies  were all reviewed and updated as appropriate today. Review of systems per HPI above, otherwise negative. Vitals unable to obtain 2/2 virtual visit nature of this encounter. OF NOTE, PT BROUGHT TO OFFICE FOR TESTING AND EKG. I CHECKED HER THROAT WHEN HERE. Physical Exam  Constitutional:       General: She is not in acute distress. Appearance: Normal appearance. HENT:      Head: Normocephalic and atraumatic. Mouth/Throat:      Pharynx: Oropharyngeal exudate and posterior oropharyngeal erythema present. Tonsils: Tonsillar exudate present. 3+ on the right. 3+ on the left. Eyes:      Extraocular Movements: Extraocular movements intact. Pulmonary:      Effort: Pulmonary effort is normal.   Neurological:      General: No focal deficit present. Mental Status: She is alert and oriented to person, place, and time. Psychiatric:         Mood and Affect: Mood normal.         Behavior: Behavior normal.         Thought Content: Thought content normal.         Judgment: Judgment normal.       Assessment:  Encounter Diagnoses   Name Primary? Strep throat Yes    Sore throat     Subjective fever     Myalgia     Pre-op exam        Plan:  1. Strep throat  Exam findings and hx c/w strep tonsillitis, empiric Abx started now.  Counseled on supportive care. Given precautions and told to f/u prn.   - amoxicillin (AMOXIL) 500 MG capsule; Take 1 capsule by mouth 2 times daily for 10 days  Dispense: 20 capsule; Refill: 0    2. Sore throat  - POCT COVID-19 Rapid, NAAT    3. Subjective fever  - POCT COVID-19 Rapid, NAAT    4. Myalgia  - POCT COVID-19 Rapid, NAAT    5. Pre-op exam  Done today for upcoming knee surgery. - EKG 12 Lead - Clinic Performed    Total time spent: Over 30 minutes. This includes time spent with the patient during the visit as well as time spent before and after the visit reviewing the chart, reviewing past/present studies and documenting the encounter. Return if symptoms worsen or fail to improve. Dayami DO Ela Gaytan Leoncio is a 42 y.o. female being evaluated by a Virtual Visit (video visit) encounter to address concerns as mentioned above. A caregiver was present when appropriate. Due to this being a TeleHealth encounter (During [facility]- public health emergency), evaluation of the following organ systems was limited: Vitals/Constitutional/EENT/Resp/CV/GI//MS/Neuro/Skin/Heme-Lymph-Imm. Pursuant to the emergency declaration under the [address] [place] [address] authority and the [employer] Act, this Virtual Visit was conducted with patient's (and/or legal guardian's) consent, to reduce the patient's risk of exposure to COVID-19 and provide necessary medical care. The patient (and/or legal guardian) has also been advised to contact this office for worsening conditions or problems, and seek emergency medical treatment and/or call 911 if deemed necessary. Please note that this chart was generated using dragon dictation software. Although every effort was made to ensure the accuracy of this automated transcription, some errors in transcription may have occurred.

## 2023-01-24 ENCOUNTER — APPOINTMENT (OUTPATIENT)
Dept: PHYSICAL THERAPY | Age: 38
End: 2023-01-24
Payer: COMMERCIAL

## 2023-01-24 ENCOUNTER — TELEPHONE (OUTPATIENT)
Dept: ORTHOPEDIC SURGERY | Age: 38
End: 2023-01-24

## 2023-01-24 NOTE — TELEPHONE ENCOUNTER
General Question     Subject: [facility]  Patient and /or Facility Request: Tammy Hsieh \"Lesa\"  Contact Number:  [phone]    ALEX @[facility] CALLING TO GIVE AUTHORIZATION FOR REPLACEMENT OF RIGHT KNEE JOINT @JAIRO Doherty    VALIDATION DATE 02/07-04/07 2023    AUTHORIZATION NUMBER [id number]    THIS HAS BEEN APPROVED

## 2023-01-26 ENCOUNTER — APPOINTMENT (OUTPATIENT)
Dept: PHYSICAL THERAPY | Age: 38
End: 2023-01-26
Payer: COMMERCIAL

## 2023-01-30 ENCOUNTER — TELEPHONE (OUTPATIENT)
Dept: ORTHOPEDIC SURGERY | Age: 38
End: 2023-01-30

## 2023-01-30 NOTE — TELEPHONE ENCOUNTER
ORTHOPAEDIC NURSE NAVIGATOR SUMMARY NOTE      Anticipated Date of Surgery: 2/7/23    Recieved Pre-Op Education: yes   In person class:No  Pt used educational link:Yes   Pt completed pre and post test to measure learning:Yes    If pt did not complete either, why not? N/A  ERAS protocol explained to pt. Pt does not have the following medical conditions:  -Diabetes  -Gastroparesis  -CHF  -Fluid restricted diet  -Known difficult airway  Pt instructed to drink up to 40 oz of Gatorade type drink the evening prior to surgery. Pt verbalized understanding. PCP: Tevin Chavez DO   Phone #: [phone]    Date of PCP Visit for H&P: 1/17/23    Any Noted Concerns from PCP prior to surgery:  No   If Yes, what concerns?:    IS THE PATIENT IN A PAIN MANAGEMENT PROGRAM?:   No     Review of Past Medical History Reveals History of:      Critical Lab Values:   Hgb/Hct:   Hemoglobin (g/dL)   Date Value   01/18/2023 13.0   /  Hematocrit (%)   Date Value   01/18/2023 39.5      HgbA1C:    Lab Results   Component Value Date    LABA1C 6.3 01/18/2023    LABA1C 6.2 11/15/2022    LABA1C 6.1 04/18/2022      Albumin:    Lab Results   Component Value Date    LABALBU 4.0 01/18/2023      BUN/Cr:   BUN (mg/dL)   Date Value   01/18/2023 14   /  Creatinine (mg/dL)   Date Value   01/18/2023 0.7      BMI:    BMI Readings from Last 1 Encounters:   01/17/23 55.84 kg/m²        Coronary Artery Disease/HTN/CHF History: Yes- HTN      Cardiologist:     Cardiac Clearance Necessary: No    Date of Cardiac Clearance Appt: On Plavix? No,  If YES, when will they stop taking?     Final Cardiac Recommendations:N/A   -On any anticoagulation-none       Diabetes History: No    Most Recent HgbA1C: N/A    PCP or Endocrine Recommendations: N/A    Nutritionist/Dietician Consult Scheduled: N/A    Final Plan For Diabetic Control: N/A   Pulmonary: COPD/Emphysema/ Use of home oxygen: none     Alcohol use:        DVT Risk Stratification:  Low      Vascular Consult Ordered:  NA    Date of Vascular Appt:     Hematology/Oncology Consult Ordered:  NA    Date of Hematology/Oncology Appt:     Final Recommendation For DVT Prophylaxis:   -Smoking history or use of estrogen-         BMI Greater than 40 at time of scheduling?: Yes    Has Surgeon been notified of BMI concern? No    Weight Loss Clinic Consult Ordered: No    Date of Wt Loss Clinic Appt:     BMI at time of surgery (if went through [facility]): Additional Medical Concerns:         Discharge Disposition Information:     Attended Pre-Hab Program: yes     Anticipated Discharge Disposition:  Home with OP PT    Who will be with patient at home following discharge? Mom      Equipment pt already has:  Crutches- would prefer to use them. Bedroom on first or second floor: First   Bathroom on first or second floor: first   Weight bearing status: full   Pre-op ambulatory status: none   Number of entry steps: 1 step   Caregiver assistance: full time    Pt plan to DC same day: Ok to    [facility] preference:  Op PT      [facility], RN  1/30/2023

## 2023-01-31 ENCOUNTER — APPOINTMENT (OUTPATIENT)
Dept: PHYSICAL THERAPY | Age: 38
End: 2023-01-31
Payer: COMMERCIAL

## 2023-02-06 ENCOUNTER — ANESTHESIA EVENT (OUTPATIENT)
Dept: OPERATING ROOM | Age: 38
End: 2023-02-06
Payer: COMMERCIAL

## 2023-02-06 NOTE — PROGRESS NOTES
1. Do not eat or drink anything after 12 midnight prior to surgery. This includes no water, chewing gum mints, or ice chips. You may brush your teeth and gargle the day of surgery but DO NOT SWALLOW THE WATER. 2. Please see your family doctor/pediatrician for a history and physical and/or concerning medications. Bring any test results/reports from your physician's office. If you are under the care of a heart doctor or specialist please be aware that you may be asked to see him or her for clearance. 3. You may be asked to stop blood thinners such as Coumadin, Plavix, Fragmin, and Lovenox or Anti-inflammatories such as Aspirin, Ibuprofen, Advil, and Naproxen prior to your surgery. Please check with your doctor before stopping these or any other medications. 4. Do not smoke, and do not drink any alcoholic beverages 24 hours prior to surgery. 5. You MUST make arrangements for a responsible adult to take you home after your surgery. For your safety, you will not be allowed to leave alone or drive yourself home. Your surgery will be cancelled if you do not have a ride home. Also for your safety, it is strongly suggested someone stay with you the first 24 hrs after your surgery. 6. A parent/legal guardian must accompany a child scheduled for surgery and plan to stay at the hospital until the child is discharged. Please do not bring other children with you. 7. For your comfort,please wear simple, loose fitting clothing to the hospital.  Please do not bring valuables (money, credit cards, checkbooks, etc.) Do not wear any makeup (including no eye makeup) or nail polish on your fingers or toes. 8. For your safety, please DO NOT wear any jewelry or piercings on day of surgery. All body piercing jewelry must be removed. 9. If you have dentures, they will be removed before going to the OR; for your convenience we will provide you with a container.   If you wear contact lenses or glasses, they will be removed, they will be removed, please bring a case for them. 10. If appicable,Please see your family doctor/pediatrician for a history & physical and/or concerning medications. Bring any test results/reports from your physician's office. 11. Remember to bring Blood Bank bracelet to the hospital on the day of surgery. 12. If you have a Living Will and Durable Power of  for Healthcare, please bring in a copy. 15. Notify your Surgeon if you develop any illness between now and surgery  time, cough, cold, fever, sore throat, nausea, vomiting, etc.  Please notify your surgeon if you experience dizziness, shortness of breath or blurred vision between now & the time of your surgery   14. DO NOT shave your operative site 96 hours prior to surgery. For face & neck surgery, men may use an electric razor 48 hours prior to surgery. 15. Shower the night before surgery with ___Antibacterial soap _X__Hibiclens   16. To provide excellent care visitors will be limited to one in the room at any given time. 17.  Please bring picture ID and insurance card. 18.  Visit our web site for additional information:  [facility]/surgery.         INSTRUCTED TO HOLD LOSARTAN DOS PER ANESTHESIA REQUEST (last dose 2/6 am)

## 2023-02-06 NOTE — PROGRESS NOTES
Pt instructed to drink up to 40 oz of Gatorade type drink the evening prior to surgery. Pt informed they can have up to 40 oz of water and that it must be completed 2 hours prior to scheduled surgery - No water DOS due to BMI >40 per anesthesia request - patient is aware.  Clear liquids only   Until 0700 for surgery time at 1300

## 2023-02-07 ENCOUNTER — ANESTHESIA (OUTPATIENT)
Dept: OPERATING ROOM | Age: 38
End: 2023-02-07
Payer: COMMERCIAL

## 2023-02-07 ENCOUNTER — TELEPHONE (OUTPATIENT)
Dept: ORTHOPEDIC SURGERY | Age: 38
End: 2023-02-07

## 2023-02-07 ENCOUNTER — APPOINTMENT (OUTPATIENT)
Dept: GENERAL RADIOLOGY | Age: 38
End: 2023-02-07
Attending: STUDENT IN AN ORGANIZED HEALTH CARE EDUCATION/TRAINING PROGRAM
Payer: COMMERCIAL

## 2023-02-07 ENCOUNTER — HOSPITAL ENCOUNTER (OUTPATIENT)
Age: 38
Setting detail: OBSERVATION
Discharge: HOME HEALTH | End: 2023-02-08
Attending: STUDENT IN AN ORGANIZED HEALTH CARE EDUCATION/TRAINING PROGRAM | Admitting: STUDENT IN AN ORGANIZED HEALTH CARE EDUCATION/TRAINING PROGRAM
Payer: COMMERCIAL

## 2023-02-07 DIAGNOSIS — Z96.651 S/P TOTAL KNEE ARTHROPLASTY, RIGHT: Primary | ICD-10-CM

## 2023-02-07 LAB
ABO/RH: NORMAL
ANTIBODY SCREEN: NORMAL
GLUCOSE BLD-MCNC: 103 MG/DL (ref 70–99)
PERFORMED ON: ABNORMAL
PREGNANCY, URINE: NEGATIVE

## 2023-02-07 PROCEDURE — 2500000003 HC RX 250 WO HCPCS: Performed by: STUDENT IN AN ORGANIZED HEALTH CARE EDUCATION/TRAINING PROGRAM

## 2023-02-07 PROCEDURE — 6370000000 HC RX 637 (ALT 250 FOR IP): Performed by: ANESTHESIOLOGY

## 2023-02-07 PROCEDURE — 3600000014 HC SURGERY LEVEL 4 ADDTL 15MIN: Performed by: STUDENT IN AN ORGANIZED HEALTH CARE EDUCATION/TRAINING PROGRAM

## 2023-02-07 PROCEDURE — 6360000002 HC RX W HCPCS: Performed by: ANESTHESIOLOGY

## 2023-02-07 PROCEDURE — 6360000002 HC RX W HCPCS: Performed by: NURSE ANESTHETIST, CERTIFIED REGISTERED

## 2023-02-07 PROCEDURE — 6360000002 HC RX W HCPCS: Performed by: STUDENT IN AN ORGANIZED HEALTH CARE EDUCATION/TRAINING PROGRAM

## 2023-02-07 PROCEDURE — 94761 N-INVAS EAR/PLS OXIMETRY MLT: CPT

## 2023-02-07 PROCEDURE — 86901 BLOOD TYPING SEROLOGIC RH(D): CPT

## 2023-02-07 PROCEDURE — A4217 STERILE WATER/SALINE, 500 ML: HCPCS | Performed by: STUDENT IN AN ORGANIZED HEALTH CARE EDUCATION/TRAINING PROGRAM

## 2023-02-07 PROCEDURE — C1713 ANCHOR/SCREW BN/BN,TIS/BN: HCPCS | Performed by: STUDENT IN AN ORGANIZED HEALTH CARE EDUCATION/TRAINING PROGRAM

## 2023-02-07 PROCEDURE — 3600000004 HC SURGERY LEVEL 4 BASE: Performed by: STUDENT IN AN ORGANIZED HEALTH CARE EDUCATION/TRAINING PROGRAM

## 2023-02-07 PROCEDURE — 86900 BLOOD TYPING SEROLOGIC ABO: CPT

## 2023-02-07 PROCEDURE — 2700000000 HC OXYGEN THERAPY PER DAY

## 2023-02-07 PROCEDURE — 3700000000 HC ANESTHESIA ATTENDED CARE: Performed by: STUDENT IN AN ORGANIZED HEALTH CARE EDUCATION/TRAINING PROGRAM

## 2023-02-07 PROCEDURE — G0378 HOSPITAL OBSERVATION PER HR: HCPCS

## 2023-02-07 PROCEDURE — 3700000001 HC ADD 15 MINUTES (ANESTHESIA): Performed by: STUDENT IN AN ORGANIZED HEALTH CARE EDUCATION/TRAINING PROGRAM

## 2023-02-07 PROCEDURE — 7100000001 HC PACU RECOVERY - ADDTL 15 MIN: Performed by: STUDENT IN AN ORGANIZED HEALTH CARE EDUCATION/TRAINING PROGRAM

## 2023-02-07 PROCEDURE — 2580000003 HC RX 258: Performed by: STUDENT IN AN ORGANIZED HEALTH CARE EDUCATION/TRAINING PROGRAM

## 2023-02-07 PROCEDURE — 2580000003 HC RX 258: Performed by: ANESTHESIOLOGY

## 2023-02-07 PROCEDURE — 84703 CHORIONIC GONADOTROPIN ASSAY: CPT

## 2023-02-07 PROCEDURE — 73560 X-RAY EXAM OF KNEE 1 OR 2: CPT

## 2023-02-07 PROCEDURE — 86850 RBC ANTIBODY SCREEN: CPT

## 2023-02-07 PROCEDURE — 2500000003 HC RX 250 WO HCPCS: Performed by: NURSE ANESTHETIST, CERTIFIED REGISTERED

## 2023-02-07 PROCEDURE — C1776 JOINT DEVICE (IMPLANTABLE): HCPCS | Performed by: STUDENT IN AN ORGANIZED HEALTH CARE EDUCATION/TRAINING PROGRAM

## 2023-02-07 PROCEDURE — 64447 NJX AA&/STRD FEMORAL NRV IMG: CPT | Performed by: ANESTHESIOLOGY

## 2023-02-07 PROCEDURE — 2580000003 HC RX 258: Performed by: NURSE ANESTHETIST, CERTIFIED REGISTERED

## 2023-02-07 PROCEDURE — 2709999900 HC NON-CHARGEABLE SUPPLY: Performed by: STUDENT IN AN ORGANIZED HEALTH CARE EDUCATION/TRAINING PROGRAM

## 2023-02-07 PROCEDURE — 7100000000 HC PACU RECOVERY - FIRST 15 MIN: Performed by: STUDENT IN AN ORGANIZED HEALTH CARE EDUCATION/TRAINING PROGRAM

## 2023-02-07 PROCEDURE — 2720000010 HC SURG SUPPLY STERILE: Performed by: STUDENT IN AN ORGANIZED HEALTH CARE EDUCATION/TRAINING PROGRAM

## 2023-02-07 DEVICE — PSN FEM CR POR CCR NRW SZ7 R: Type: IMPLANTABLE DEVICE | Site: KNEE | Status: FUNCTIONAL

## 2023-02-07 DEVICE — PSN TIB POR 2 PEG SZ E R: Type: IMPLANTABLE DEVICE | Site: KNEE | Status: FUNCTIONAL

## 2023-02-07 DEVICE — IMPLANTABLE DEVICE: Type: IMPLANTABLE DEVICE | Site: KNEE | Status: FUNCTIONAL

## 2023-02-07 RX ORDER — SODIUM CHLORIDE 9 MG/ML
INJECTION, SOLUTION INTRAVENOUS PRN
Status: DISCONTINUED | OUTPATIENT
Start: 2023-02-07 | End: 2023-02-07 | Stop reason: HOSPADM

## 2023-02-07 RX ORDER — ACETAMINOPHEN 500 MG
1000 TABLET ORAL ONCE
Status: COMPLETED | OUTPATIENT
Start: 2023-02-07 | End: 2023-02-07

## 2023-02-07 RX ORDER — PROCHLORPERAZINE EDISYLATE 5 MG/ML
10 INJECTION INTRAMUSCULAR; INTRAVENOUS EVERY 6 HOURS PRN
Status: DISCONTINUED | OUTPATIENT
Start: 2023-02-07 | End: 2023-02-08 | Stop reason: HOSPADM

## 2023-02-07 RX ORDER — MIDAZOLAM HYDROCHLORIDE 1 MG/ML
1 INJECTION INTRAMUSCULAR; INTRAVENOUS EVERY 5 MIN PRN
Status: DISCONTINUED | OUTPATIENT
Start: 2023-02-07 | End: 2023-02-07 | Stop reason: HOSPADM

## 2023-02-07 RX ORDER — SODIUM CHLORIDE 0.9 % (FLUSH) 0.9 %
5-40 SYRINGE (ML) INJECTION PRN
Status: DISCONTINUED | OUTPATIENT
Start: 2023-02-07 | End: 2023-02-07 | Stop reason: HOSPADM

## 2023-02-07 RX ORDER — DEXAMETHASONE SODIUM PHOSPHATE 4 MG/ML
INJECTION, SOLUTION INTRA-ARTICULAR; INTRALESIONAL; INTRAMUSCULAR; INTRAVENOUS; SOFT TISSUE PRN
Status: DISCONTINUED | OUTPATIENT
Start: 2023-02-07 | End: 2023-02-07 | Stop reason: SDUPTHER

## 2023-02-07 RX ORDER — OXYCODONE HYDROCHLORIDE 5 MG/1
10 TABLET ORAL EVERY 4 HOURS PRN
Status: DISCONTINUED | OUTPATIENT
Start: 2023-02-07 | End: 2023-02-08 | Stop reason: HOSPADM

## 2023-02-07 RX ORDER — PROPOFOL 10 MG/ML
INJECTION, EMULSION INTRAVENOUS PRN
Status: DISCONTINUED | OUTPATIENT
Start: 2023-02-07 | End: 2023-02-07 | Stop reason: SDUPTHER

## 2023-02-07 RX ORDER — SODIUM CHLORIDE 0.9 % (FLUSH) 0.9 %
5-40 SYRINGE (ML) INJECTION EVERY 12 HOURS SCHEDULED
Status: DISCONTINUED | OUTPATIENT
Start: 2023-02-07 | End: 2023-02-07 | Stop reason: HOSPADM

## 2023-02-07 RX ORDER — SODIUM CHLORIDE, SODIUM LACTATE, POTASSIUM CHLORIDE, CALCIUM CHLORIDE 600; 310; 30; 20 MG/100ML; MG/100ML; MG/100ML; MG/100ML
INJECTION, SOLUTION INTRAVENOUS CONTINUOUS
Status: DISCONTINUED | OUTPATIENT
Start: 2023-02-07 | End: 2023-02-08 | Stop reason: HOSPADM

## 2023-02-07 RX ORDER — ROCURONIUM BROMIDE 10 MG/ML
INJECTION, SOLUTION INTRAVENOUS PRN
Status: DISCONTINUED | OUTPATIENT
Start: 2023-02-07 | End: 2023-02-07 | Stop reason: SDUPTHER

## 2023-02-07 RX ORDER — POLYETHYLENE GLYCOL 3350 17 G/17G
17 POWDER, FOR SOLUTION ORAL DAILY
Status: DISCONTINUED | OUTPATIENT
Start: 2023-02-08 | End: 2023-02-08 | Stop reason: HOSPADM

## 2023-02-07 RX ORDER — OXYCODONE HYDROCHLORIDE 5 MG/1
5 TABLET ORAL EVERY 4 HOURS PRN
Status: DISCONTINUED | OUTPATIENT
Start: 2023-02-07 | End: 2023-02-08 | Stop reason: HOSPADM

## 2023-02-07 RX ORDER — CHLORTHALIDONE 25 MG/1
25 TABLET ORAL DAILY
Status: DISCONTINUED | OUTPATIENT
Start: 2023-02-08 | End: 2023-02-08 | Stop reason: HOSPADM

## 2023-02-07 RX ORDER — SODIUM CHLORIDE 0.9 % (FLUSH) 0.9 %
5-40 SYRINGE (ML) INJECTION PRN
Status: DISCONTINUED | OUTPATIENT
Start: 2023-02-07 | End: 2023-02-08 | Stop reason: HOSPADM

## 2023-02-07 RX ORDER — MAGNESIUM HYDROXIDE 1200 MG/15ML
LIQUID ORAL CONTINUOUS PRN
Status: DISCONTINUED | OUTPATIENT
Start: 2023-02-07 | End: 2023-02-07 | Stop reason: HOSPADM

## 2023-02-07 RX ORDER — FENTANYL CITRATE 50 UG/ML
INJECTION, SOLUTION INTRAMUSCULAR; INTRAVENOUS PRN
Status: DISCONTINUED | OUTPATIENT
Start: 2023-02-07 | End: 2023-02-07 | Stop reason: SDUPTHER

## 2023-02-07 RX ORDER — HYDROMORPHONE HCL 110MG/55ML
PATIENT CONTROLLED ANALGESIA SYRINGE INTRAVENOUS PRN
Status: DISCONTINUED | OUTPATIENT
Start: 2023-02-07 | End: 2023-02-07 | Stop reason: SDUPTHER

## 2023-02-07 RX ORDER — SODIUM CHLORIDE 0.9 % (FLUSH) 0.9 %
5-40 SYRINGE (ML) INJECTION EVERY 12 HOURS SCHEDULED
Status: DISCONTINUED | OUTPATIENT
Start: 2023-02-07 | End: 2023-02-08 | Stop reason: HOSPADM

## 2023-02-07 RX ORDER — ACETAMINOPHEN 325 MG/1
650 TABLET ORAL EVERY 6 HOURS
Status: DISCONTINUED | OUTPATIENT
Start: 2023-02-07 | End: 2023-02-08 | Stop reason: HOSPADM

## 2023-02-07 RX ORDER — MORPHINE SULFATE 2 MG/ML
2 INJECTION, SOLUTION INTRAMUSCULAR; INTRAVENOUS
Status: DISCONTINUED | OUTPATIENT
Start: 2023-02-07 | End: 2023-02-08 | Stop reason: HOSPADM

## 2023-02-07 RX ORDER — LIDOCAINE HYDROCHLORIDE 10 MG/ML
1 INJECTION, SOLUTION EPIDURAL; INFILTRATION; INTRACAUDAL; PERINEURAL
Status: DISCONTINUED | OUTPATIENT
Start: 2023-02-07 | End: 2023-02-07 | Stop reason: HOSPADM

## 2023-02-07 RX ORDER — MEPERIDINE HYDROCHLORIDE 50 MG/ML
12.5 INJECTION INTRAMUSCULAR; INTRAVENOUS; SUBCUTANEOUS EVERY 5 MIN PRN
Status: DISCONTINUED | OUTPATIENT
Start: 2023-02-07 | End: 2023-02-07 | Stop reason: HOSPADM

## 2023-02-07 RX ORDER — SODIUM CHLORIDE 9 MG/ML
INJECTION, SOLUTION INTRAVENOUS PRN
Status: DISCONTINUED | OUTPATIENT
Start: 2023-02-07 | End: 2023-02-08 | Stop reason: HOSPADM

## 2023-02-07 RX ORDER — ONDANSETRON 2 MG/ML
4 INJECTION INTRAMUSCULAR; INTRAVENOUS EVERY 10 MIN PRN
Status: DISCONTINUED | OUTPATIENT
Start: 2023-02-07 | End: 2023-02-07 | Stop reason: HOSPADM

## 2023-02-07 RX ORDER — MELOXICAM 7.5 MG/1
7.5 TABLET ORAL DAILY
Status: DISCONTINUED | OUTPATIENT
Start: 2023-02-08 | End: 2023-02-08 | Stop reason: HOSPADM

## 2023-02-07 RX ORDER — DIPHENHYDRAMINE HYDROCHLORIDE 50 MG/ML
12.5 INJECTION INTRAMUSCULAR; INTRAVENOUS
Status: DISCONTINUED | OUTPATIENT
Start: 2023-02-07 | End: 2023-02-07 | Stop reason: HOSPADM

## 2023-02-07 RX ORDER — LIDOCAINE HYDROCHLORIDE 20 MG/ML
INJECTION, SOLUTION INFILTRATION; PERINEURAL PRN
Status: DISCONTINUED | OUTPATIENT
Start: 2023-02-07 | End: 2023-02-07 | Stop reason: SDUPTHER

## 2023-02-07 RX ORDER — SODIUM CHLORIDE 9 MG/ML
25 INJECTION, SOLUTION INTRAVENOUS PRN
Status: DISCONTINUED | OUTPATIENT
Start: 2023-02-07 | End: 2023-02-07 | Stop reason: HOSPADM

## 2023-02-07 RX ORDER — OXYCODONE HYDROCHLORIDE 5 MG/1
5 TABLET ORAL
Status: COMPLETED | OUTPATIENT
Start: 2023-02-07 | End: 2023-02-07

## 2023-02-07 RX ORDER — SODIUM CHLORIDE, SODIUM LACTATE, POTASSIUM CHLORIDE, CALCIUM CHLORIDE 600; 310; 30; 20 MG/100ML; MG/100ML; MG/100ML; MG/100ML
INJECTION, SOLUTION INTRAVENOUS CONTINUOUS
Status: DISCONTINUED | OUTPATIENT
Start: 2023-02-07 | End: 2023-02-07 | Stop reason: HOSPADM

## 2023-02-07 RX ORDER — ONDANSETRON 2 MG/ML
INJECTION INTRAMUSCULAR; INTRAVENOUS PRN
Status: DISCONTINUED | OUTPATIENT
Start: 2023-02-07 | End: 2023-02-07 | Stop reason: SDUPTHER

## 2023-02-07 RX ORDER — CEFAZOLIN SODIUM IN 0.9 % NACL 2 G/100 ML
2000 PLASTIC BAG, INJECTION (ML) INTRAVENOUS ONCE
Status: COMPLETED | OUTPATIENT
Start: 2023-02-07 | End: 2023-02-07

## 2023-02-07 RX ORDER — SENNA PLUS 8.6 MG/1
1 TABLET ORAL DAILY PRN
Status: DISCONTINUED | OUTPATIENT
Start: 2023-02-07 | End: 2023-02-08 | Stop reason: HOSPADM

## 2023-02-07 RX ORDER — HYDRALAZINE HYDROCHLORIDE 20 MG/ML
5 INJECTION INTRAMUSCULAR; INTRAVENOUS
Status: DISCONTINUED | OUTPATIENT
Start: 2023-02-07 | End: 2023-02-07 | Stop reason: HOSPADM

## 2023-02-07 RX ORDER — DEXAMETHASONE SODIUM PHOSPHATE 10 MG/ML
8 INJECTION INTRAMUSCULAR; INTRAVENOUS EVERY 12 HOURS
Status: DISCONTINUED | OUTPATIENT
Start: 2023-02-07 | End: 2023-02-08 | Stop reason: HOSPADM

## 2023-02-07 RX ORDER — TRANEXAMIC ACID 100 MG/ML
INJECTION, SOLUTION INTRAVENOUS PRN
Status: DISCONTINUED | OUTPATIENT
Start: 2023-02-07 | End: 2023-02-07 | Stop reason: SDUPTHER

## 2023-02-07 RX ORDER — KETAMINE HCL IN NACL, ISO-OSM 100MG/10ML
SYRINGE (ML) INJECTION PRN
Status: DISCONTINUED | OUTPATIENT
Start: 2023-02-07 | End: 2023-02-07 | Stop reason: SDUPTHER

## 2023-02-07 RX ORDER — CELECOXIB 100 MG/1
400 CAPSULE ORAL ONCE
Status: COMPLETED | OUTPATIENT
Start: 2023-02-07 | End: 2023-02-07

## 2023-02-07 RX ORDER — MORPHINE SULFATE 4 MG/ML
4 INJECTION, SOLUTION INTRAMUSCULAR; INTRAVENOUS
Status: DISCONTINUED | OUTPATIENT
Start: 2023-02-07 | End: 2023-02-08 | Stop reason: HOSPADM

## 2023-02-07 RX ORDER — MAGNESIUM SULFATE IN WATER 40 MG/ML
2000 INJECTION, SOLUTION INTRAVENOUS ONCE
Status: COMPLETED | OUTPATIENT
Start: 2023-02-07 | End: 2023-02-07

## 2023-02-07 RX ADMIN — DEXMEDETOMIDINE HYDROCHLORIDE 4 MCG: 100 INJECTION, SOLUTION INTRAVENOUS at 18:10

## 2023-02-07 RX ADMIN — SODIUM CHLORIDE, PRESERVATIVE FREE 10 ML: 5 INJECTION INTRAVENOUS at 22:58

## 2023-02-07 RX ADMIN — ONDANSETRON 4 MG: 2 INJECTION INTRAMUSCULAR; INTRAVENOUS at 21:34

## 2023-02-07 RX ADMIN — Medication 3000 MG: at 16:52

## 2023-02-07 RX ADMIN — HYDROMORPHONE HYDROCHLORIDE 0.5 MG: 2 INJECTION, SOLUTION INTRAMUSCULAR; INTRAVENOUS; SUBCUTANEOUS at 19:07

## 2023-02-07 RX ADMIN — LIDOCAINE HYDROCHLORIDE 100 MG: 20 INJECTION, SOLUTION INFILTRATION; PERINEURAL at 16:58

## 2023-02-07 RX ADMIN — TRANEXAMIC ACID 1000 MG: 100 INJECTION, SOLUTION INTRAVENOUS at 17:10

## 2023-02-07 RX ADMIN — DEXMEDETOMIDINE HYDROCHLORIDE 8 MCG: 100 INJECTION, SOLUTION INTRAVENOUS at 17:23

## 2023-02-07 RX ADMIN — PROPOFOL 250 MG: 10 INJECTION, EMULSION INTRAVENOUS at 16:59

## 2023-02-07 RX ADMIN — OXYCODONE HYDROCHLORIDE 5 MG: 5 TABLET ORAL at 20:19

## 2023-02-07 RX ADMIN — PROPOFOL 25 MG: 10 INJECTION, EMULSION INTRAVENOUS at 19:05

## 2023-02-07 RX ADMIN — HYDROMORPHONE HYDROCHLORIDE 0.5 MG: 1 INJECTION, SOLUTION INTRAMUSCULAR; INTRAVENOUS; SUBCUTANEOUS at 19:41

## 2023-02-07 RX ADMIN — DEXMEDETOMIDINE HYDROCHLORIDE 4 MCG: 100 INJECTION, SOLUTION INTRAVENOUS at 17:30

## 2023-02-07 RX ADMIN — ROCURONIUM BROMIDE 50 MG: 10 SOLUTION INTRAVENOUS at 16:59

## 2023-02-07 RX ADMIN — PROPOFOL 25 MG: 10 INJECTION, EMULSION INTRAVENOUS at 19:19

## 2023-02-07 RX ADMIN — CELECOXIB 400 MG: 100 CAPSULE ORAL at 14:11

## 2023-02-07 RX ADMIN — FENTANYL CITRATE 50 MCG: 50 INJECTION INTRAMUSCULAR; INTRAVENOUS at 17:26

## 2023-02-07 RX ADMIN — MIDAZOLAM 1 MG: 1 INJECTION INTRAMUSCULAR; INTRAVENOUS at 19:58

## 2023-02-07 RX ADMIN — DEXAMETHASONE SODIUM PHOSPHATE 10 MG: 4 INJECTION, SOLUTION INTRAMUSCULAR; INTRAVENOUS at 17:07

## 2023-02-07 RX ADMIN — DEXMEDETOMIDINE HYDROCHLORIDE 4 MCG: 100 INJECTION, SOLUTION INTRAVENOUS at 17:41

## 2023-02-07 RX ADMIN — PROPOFOL 25 MG: 10 INJECTION, EMULSION INTRAVENOUS at 19:14

## 2023-02-07 RX ADMIN — HYDROMORPHONE HYDROCHLORIDE 0.25 MG: 1 INJECTION, SOLUTION INTRAMUSCULAR; INTRAVENOUS; SUBCUTANEOUS at 20:53

## 2023-02-07 RX ADMIN — SUGAMMADEX 50 MG: 100 INJECTION, SOLUTION INTRAVENOUS at 19:02

## 2023-02-07 RX ADMIN — HYDROMORPHONE HYDROCHLORIDE 0.5 MG: 1 INJECTION, SOLUTION INTRAMUSCULAR; INTRAVENOUS; SUBCUTANEOUS at 20:15

## 2023-02-07 RX ADMIN — DEXMEDETOMIDINE HYDROCHLORIDE 4 MCG: 100 INJECTION, SOLUTION INTRAVENOUS at 18:29

## 2023-02-07 RX ADMIN — TRANEXAMIC ACID 1000 MG: 100 INJECTION, SOLUTION INTRAVENOUS at 18:39

## 2023-02-07 RX ADMIN — SUGAMMADEX 50 MG: 100 INJECTION, SOLUTION INTRAVENOUS at 19:19

## 2023-02-07 RX ADMIN — SODIUM CHLORIDE, POTASSIUM CHLORIDE, SODIUM LACTATE AND CALCIUM CHLORIDE: 600; 310; 30; 20 INJECTION, SOLUTION INTRAVENOUS at 22:55

## 2023-02-07 RX ADMIN — ROCURONIUM BROMIDE 30 MG: 10 SOLUTION INTRAVENOUS at 17:43

## 2023-02-07 RX ADMIN — DEXAMETHASONE SODIUM PHOSPHATE 8 MG: 10 INJECTION INTRAMUSCULAR; INTRAVENOUS at 22:55

## 2023-02-07 RX ADMIN — PROPOFOL 25 MG: 10 INJECTION, EMULSION INTRAVENOUS at 19:09

## 2023-02-07 RX ADMIN — ONDANSETRON 4 MG: 2 INJECTION INTRAMUSCULAR; INTRAVENOUS at 17:07

## 2023-02-07 RX ADMIN — SUGAMMADEX 50 MG: 100 INJECTION, SOLUTION INTRAVENOUS at 19:14

## 2023-02-07 RX ADMIN — Medication 25 MG: at 17:32

## 2023-02-07 RX ADMIN — HYDROMORPHONE HYDROCHLORIDE 0.5 MG: 1 INJECTION, SOLUTION INTRAMUSCULAR; INTRAVENOUS; SUBCUTANEOUS at 20:07

## 2023-02-07 RX ADMIN — DEXMEDETOMIDINE HYDROCHLORIDE 4 MCG: 100 INJECTION, SOLUTION INTRAVENOUS at 19:06

## 2023-02-07 RX ADMIN — HYDROMORPHONE HYDROCHLORIDE 0.5 MG: 1 INJECTION, SOLUTION INTRAMUSCULAR; INTRAVENOUS; SUBCUTANEOUS at 19:50

## 2023-02-07 RX ADMIN — DEXMEDETOMIDINE HYDROCHLORIDE 4 MCG: 100 INJECTION, SOLUTION INTRAVENOUS at 17:54

## 2023-02-07 RX ADMIN — ACETAMINOPHEN 1000 MG: 500 TABLET ORAL at 14:11

## 2023-02-07 RX ADMIN — SODIUM CHLORIDE, SODIUM LACTATE, POTASSIUM CHLORIDE, AND CALCIUM CHLORIDE: .6; .31; .03; .02 INJECTION, SOLUTION INTRAVENOUS at 16:53

## 2023-02-07 RX ADMIN — HYDROMORPHONE HYDROCHLORIDE 0.5 MG: 2 INJECTION, SOLUTION INTRAMUSCULAR; INTRAVENOUS; SUBCUTANEOUS at 19:33

## 2023-02-07 RX ADMIN — DEXMEDETOMIDINE HYDROCHLORIDE 4 MCG: 100 INJECTION, SOLUTION INTRAVENOUS at 18:52

## 2023-02-07 RX ADMIN — FENTANYL CITRATE 50 MCG: 50 INJECTION INTRAMUSCULAR; INTRAVENOUS at 16:58

## 2023-02-07 RX ADMIN — HYDROMORPHONE HYDROCHLORIDE 0.5 MG: 2 INJECTION, SOLUTION INTRAMUSCULAR; INTRAVENOUS; SUBCUTANEOUS at 18:12

## 2023-02-07 RX ADMIN — SUGAMMADEX 50 MG: 100 INJECTION, SOLUTION INTRAVENOUS at 19:07

## 2023-02-07 RX ADMIN — MAGNESIUM SULFATE IN WATER 2000 MG: 40 INJECTION, SOLUTION INTRAVENOUS at 17:03

## 2023-02-07 RX ADMIN — HYDROMORPHONE HYDROCHLORIDE 0.5 MG: 2 INJECTION, SOLUTION INTRAMUSCULAR; INTRAVENOUS; SUBCUTANEOUS at 17:49

## 2023-02-07 ASSESSMENT — PAIN DESCRIPTION - LOCATION
LOCATION: KNEE

## 2023-02-07 ASSESSMENT — PAIN DESCRIPTION - ORIENTATION
ORIENTATION: RIGHT

## 2023-02-07 ASSESSMENT — PAIN - FUNCTIONAL ASSESSMENT: PAIN_FUNCTIONAL_ASSESSMENT: 0-10

## 2023-02-07 ASSESSMENT — PAIN DESCRIPTION - DESCRIPTORS
DESCRIPTORS: ACHING;DISCOMFORT
DESCRIPTORS: ACHING;DISCOMFORT
DESCRIPTORS: ACHING
DESCRIPTORS: ACHING;CRAMPING

## 2023-02-07 ASSESSMENT — PAIN SCALES - GENERAL
PAINLEVEL_OUTOF10: 7
PAINLEVEL_OUTOF10: 6
PAINLEVEL_OUTOF10: 7
PAINLEVEL_OUTOF10: 4
PAINLEVEL_OUTOF10: 6
PAINLEVEL_OUTOF10: 5

## 2023-02-07 NOTE — ANESTHESIA PRE PROCEDURE
Department of Anesthesiology  Preprocedure Note       Name:  Kaushik Celaya   Age:  42 y.o.  :  1985                                          MRN:  [id number]         Date:  2023      Surgeon: Shantell Lopez):  Roberto Reid MD    Procedure: Procedure(s):  RIGHT TOTAL KNEE ARTHROPLASTY            ANGEL BI[employer] NOTE: ADDUCTOR CANAL BLOCK, IPAC    Medications prior to admission:   Prior to Admission medications    Medication Sig Start Date End Date Taking?  Authorizing Provider   chlorthalidone (HYGROTON) 25 MG tablet TAKE ONE TABLET BY MOUTH DAILY  Patient taking differently: Take 25 mg by mouth daily TAKE ONE TABLET BY MOUTH DAILY 11/15/22   Lisette Marcum DO   losartan (COZAAR) 50 MG tablet TAKE ONE TABLET BY MOUTH DAILY  Patient taking differently: Take 50 mg by mouth daily TAKE ONE TABLET BY MOUTH DAILY 11/15/22   Lisette Grossman DO       Current medications:    Current Facility-Administered Medications   Medication Dose Route Frequency Provider Last Rate Last Admin    lidocaine PF 1 % injection 1 mL  1 mL IntraDERmal Once PRN Marge Stearns MD        acetaminophen (TYLENOL) tablet 1,000 mg  1,000 mg Oral Once Marge Stearns MD        celecoxib (CELEBREX) capsule 400 mg  400 mg Oral Once Marge Stearns MD        lactated ringers IV soln infusion   IntraVENous Continuous Marge Stearns MD        sodium chloride flush 0.9 % injection 5-40 mL  5-40 mL IntraVENous 2 times per day Marge Stearns MD        sodium chloride flush 0.9 % injection 5-40 mL  5-40 mL IntraVENous PRN Marge Stearns MD        0.9 % sodium chloride infusion   IntraVENous PRN Marge Stearns MD        magnesium sulfate 2000 mg in 50 mL IVPB premix  2,000 mg IntraVENous Once Marge Stearns MD           Allergies:  No Known Allergies    Problem List:    Patient Active Problem List   Diagnosis Code    Acquired varus deformity knee M21.169    Primary osteoarthritis of left knee M17.12    Obesity, morbid, BMI 50 or higher ([facility]) E66.01    Essential hypertension I10    Hiatal hernia with GERD and esophagitis K44.9, K21.00    Impaired glucose tolerance R73.02    Chronic pain of right knee M25.561, G89.29    Primary osteoarthritis of right knee M17.11       Past Medical History:        Diagnosis Date    Arthritis     left knee    Hyperlipidemia     Hypertension     Impaired fasting glucose     Obesity        Past Surgical History:        Procedure Laterality Date    KNEE ARTHROSCOPY Right 05/2020    KNEE SURGERY Bilateral     patella realignment-2 on right knee and one on left    OTHER SURGICAL HISTORY N/A 03/30/2018    EGD with biopsy       Social History:    Social History     Tobacco Use    Smoking status: Never    Smokeless tobacco: Never   Substance Use Topics    Alcohol use: Yes     Alcohol/week: 2.0 standard drinks     Types: 1 Glasses of wine, 1 Cans of beer per week     Comment: once a week                                Counseling given: Not Answered      Vital Signs (Current):   Vitals:    02/03/23 1008 02/07/23 1337   BP:  132/85   Pulse:  72   Resp:  16   Temp:  98 °F (36.7 °C)   TempSrc:  Temporal   SpO2:  96%   Weight: (!) 322 lb (146.1 kg) (!) 317 lb (143.8 kg)   Height: 5' 3.75\" (1.619 m) 5' 3.75\" (1.619 m)                                              BP Readings from Last 3 Encounters:   02/07/23 132/85   01/17/23 116/79   11/15/22 117/77       NPO Status: Time of last liquid consumption: 0630                        Time of last solid consumption: 2200                        Date of last liquid consumption: 02/07/23                        Date of last solid food consumption: 02/06/23    BMI:   Wt Readings from Last 3 Encounters:   02/07/23 (!) 317 lb (143.8 kg)   01/17/23 (!) 322 lb 12.8 oz (146.4 kg)   01/11/23 (!) 323 lb (146.5 kg)     Body mass index is 54.84 kg/m².     CBC:   Lab Results Component Value Date/Time    WBC 7.8 01/18/2023 08:46 AM    RBC 4.83 01/18/2023 08:46 AM    HGB 13.0 01/18/2023 08:46 AM    HCT 39.5 01/18/2023 08:46 AM    MCV 81.8 01/18/2023 08:46 AM    RDW 14.1 01/18/2023 08:46 AM     01/18/2023 08:46 AM       CMP:   Lab Results   Component Value Date/Time     01/18/2023 08:46 AM    K 3.7 01/18/2023 08:46 AM     01/18/2023 08:46 AM    CO2 21 01/18/2023 08:46 AM    BUN 14 01/18/2023 08:46 AM    CREATININE 0.7 01/18/2023 08:46 AM    GFRAA >60 04/18/2022 09:16 AM    AGRATIO 1.5 01/18/2023 08:46 AM    LABGLOM >60 01/18/2023 08:46 AM    GLUCOSE 125 01/18/2023 08:46 AM    PROT 6.6 01/18/2023 08:46 AM    CALCIUM 9.2 01/18/2023 08:46 AM    BILITOT 0.3 01/18/2023 08:46 AM    ALKPHOS 68 01/18/2023 08:46 AM    AST 18 01/18/2023 08:46 AM    ALT 17 01/18/2023 08:46 AM       POC Tests: No results for input(s): POCGLU, POCNA, POCK, POCCL, POCBUN, POCHEMO, POCHCT in the last 72 hours.     Coags:   Lab Results   Component Value Date/Time    PROTIME 13.5 01/18/2023 08:46 AM    INR 1.04 01/18/2023 08:46 AM    APTT 32.4 01/18/2023 08:46 AM       HCG (If Applicable):   Lab Results   Component Value Date    PREGTESTUR Negative 03/30/2018        ABGs: No results found for: PHART, PO2ART, YUR2UIL, WGC8AIJ, BEART, G0RDUQKZ     Type & Screen (If Applicable):  No results found for: LABABO, LABRH    Drug/Infectious Status (If Applicable):  No results found for: HIV, HEPCAB    COVID-19 Screening (If Applicable):   Lab Results   Component Value Date/Time    COVID19 Negative 01/23/2023 03:00 PM           Anesthesia Evaluation  Patient summary reviewed and Nursing notes reviewed no history of anesthetic complications:   Airway: Mallampati: II  TM distance: >3 FB   Neck ROM: full  Mouth opening: > = 3 FB   Dental: normal exam         Pulmonary:Negative Pulmonary ROS                              Cardiovascular:    (+) hypertension:,                   Neuro/Psych:   (+) neuromuscular disease:, GI/Hepatic/Renal:   (+) GERD: well controlled, morbid obesity     (-) liver disease and no renal disease       Endo/Other: Negative Endo/Other ROS       (-) diabetes mellitus               Abdominal:             Vascular: negative vascular ROS. Other Findings:           Anesthesia Plan      general     ASA 3     (I discussed with the patient the risks and benefits of PIV, general anesthesia, IV Narcotics, PACU. All questions were answered the patient agrees with the plan. Adductor canal block for post op pain.)  Induction: intravenous. MIPS: Prophylactic antiemetics administered. Anesthetic plan and risks discussed with patient, father and mother. Plan discussed with CRNA.                     Renato Case MD   2/7/2023

## 2023-02-07 NOTE — ANESTHESIA PROCEDURE NOTES
Peripheral Block    Patient location during procedure: pre-op  Reason for block: post-op pain management and at surgeon's request  Start time: 2/7/2023 3:14 PM  End time: 2/7/2023 3:19 PM  Staffing  Performed: anesthesiologist   Anesthesiologist: Eric Ocampo MD  Preanesthetic Checklist  Completed: patient identified, IV checked, site marked, risks and benefits discussed, surgical/procedural consents, equipment checked, pre-op evaluation, timeout performed, anesthesia consent given, oxygen available and monitors applied/VS acknowledged  Peripheral Block   Patient position: supine  Prep: ChloraPrep  Provider prep: mask and sterile gloves  Patient monitoring: cardiac monitor, continuous pulse ox, frequent blood pressure checks and IV access  Block type: Femoral  Adductor canal (Low Femoral)  Laterality: right  Injection technique: single-shot  Guidance: ultrasound guided  Local infiltration: lidocaine  Infiltration strength: 1 %  Local infiltration: lidocaine  Dose: 3 mL    Needle   Needle gauge: 21 G  Needle localization: ultrasound guidance  Needle length: 10 cm  Assessment   Injection assessment: negative aspiration for heme, no paresthesia on injection and local visualized surrounding nerve on ultrasound  Paresthesia pain: none  Slow fractionated injection: yes  Hemodynamics: stable  Real-time US image taken/store: yes  Outcomes: uncomplicated and patient tolerated procedure well    Additional Notes  Immediately prior to procedure a \"time out\" was called to verify the correct patient, allergies, laterality, procedure and equipment. Time out performed with  RN    Local Anesthetic: 0.25 %  Bupivacaine   Amount: 30 ml  in 5 ml increments after negative aspiration each time.   Versed 2 mg  IV    Iliopsoas Muscle and Fascia Iliaca, Femoral artery (Deep artery to the thigh take off), Femoral Vein and Femoral Nerve are identified; the tip of the need and the spread of the local anesthetic around the Femoral nerve are visualized. The Femoral nerve appeared to be anatomically normal and there were no abnormal pathologically findings seen.

## 2023-02-07 NOTE — H&P
Update History & Physical    The patient's History and Physical of January 17, 2023 was reviewed with the patient and I examined the patient. There was no change. The surgical site was confirmed by the patient and me. Plan: The risks, benefits, expected outcome, and alternative to the recommended procedure have been discussed with the patient. Patient understands and wants to proceed with the procedure.      Electronically signed by Jax Guillory MD on 7/1/3380 at 4:18 PM

## 2023-02-07 NOTE — PROGRESS NOTES
Arrived to [facility]. IV started with vein finder, Labs obtained, consents verified. NPO @ 10 pm per patient. Personal belongings to PACU.  Rojas Edwards RN

## 2023-02-08 VITALS
DIASTOLIC BLOOD PRESSURE: 82 MMHG | OXYGEN SATURATION: 93 % | RESPIRATION RATE: 18 BRPM | SYSTOLIC BLOOD PRESSURE: 131 MMHG | HEART RATE: 86 BPM | WEIGHT: 293 LBS | TEMPERATURE: 97.9 F | BODY MASS INDEX: 50.02 KG/M2 | HEIGHT: 64 IN

## 2023-02-08 LAB
ANION GAP SERPL CALCULATED.3IONS-SCNC: 11 MMOL/L (ref 3–16)
BASOPHILS ABSOLUTE: 0 K/UL (ref 0–0.2)
BASOPHILS RELATIVE PERCENT: 0.2 %
BUN BLDV-MCNC: 18 MG/DL (ref 7–20)
CALCIUM SERPL-MCNC: 8.9 MG/DL (ref 8.3–10.6)
CHLORIDE BLD-SCNC: 100 MMOL/L (ref 99–110)
CO2: 25 MMOL/L (ref 21–32)
CREAT SERPL-MCNC: 0.7 MG/DL (ref 0.6–1.1)
EOSINOPHILS ABSOLUTE: 0 K/UL (ref 0–0.6)
EOSINOPHILS RELATIVE PERCENT: 0 %
GFR SERPL CREATININE-BSD FRML MDRD: >60 ML/MIN/{1.73_M2}
GLUCOSE BLD-MCNC: 180 MG/DL (ref 70–99)
HCT VFR BLD CALC: 35.3 % (ref 36–48)
HEMOGLOBIN: 11.7 G/DL (ref 12–16)
LYMPHOCYTES ABSOLUTE: 0.8 K/UL (ref 1–5.1)
LYMPHOCYTES RELATIVE PERCENT: 6.1 %
MCH RBC QN AUTO: 27.4 PG (ref 26–34)
MCHC RBC AUTO-ENTMCNC: 33.2 G/DL (ref 31–36)
MCV RBC AUTO: 82.5 FL (ref 80–100)
MONOCYTES ABSOLUTE: 0.7 K/UL (ref 0–1.3)
MONOCYTES RELATIVE PERCENT: 4.8 %
NEUTROPHILS ABSOLUTE: 12.3 K/UL (ref 1.7–7.7)
NEUTROPHILS RELATIVE PERCENT: 88.9 %
PDW BLD-RTO: 14.2 % (ref 12.4–15.4)
PLATELET # BLD: 295 K/UL (ref 135–450)
PMV BLD AUTO: 7.2 FL (ref 5–10.5)
POTASSIUM REFLEX MAGNESIUM: 4.2 MMOL/L (ref 3.5–5.1)
RBC # BLD: 4.29 M/UL (ref 4–5.2)
SODIUM BLD-SCNC: 136 MMOL/L (ref 136–145)
WBC # BLD: 13.8 K/UL (ref 4–11)

## 2023-02-08 PROCEDURE — G0378 HOSPITAL OBSERVATION PER HR: HCPCS

## 2023-02-08 PROCEDURE — 6360000002 HC RX W HCPCS: Performed by: NURSE PRACTITIONER

## 2023-02-08 PROCEDURE — 36415 COLL VENOUS BLD VENIPUNCTURE: CPT

## 2023-02-08 PROCEDURE — 97110 THERAPEUTIC EXERCISES: CPT

## 2023-02-08 PROCEDURE — 2580000003 HC RX 258: Performed by: STUDENT IN AN ORGANIZED HEALTH CARE EDUCATION/TRAINING PROGRAM

## 2023-02-08 PROCEDURE — 85025 COMPLETE CBC W/AUTO DIFF WBC: CPT

## 2023-02-08 PROCEDURE — 99024 POSTOP FOLLOW-UP VISIT: CPT | Performed by: SPECIALIST/TECHNOLOGIST

## 2023-02-08 PROCEDURE — APPNB60 APP NON BILLABLE TIME 46-60 MINS: Performed by: SPECIALIST/TECHNOLOGIST

## 2023-02-08 PROCEDURE — 97162 PT EVAL MOD COMPLEX 30 MIN: CPT

## 2023-02-08 PROCEDURE — 6370000000 HC RX 637 (ALT 250 FOR IP): Performed by: STUDENT IN AN ORGANIZED HEALTH CARE EDUCATION/TRAINING PROGRAM

## 2023-02-08 PROCEDURE — 6360000002 HC RX W HCPCS: Performed by: STUDENT IN AN ORGANIZED HEALTH CARE EDUCATION/TRAINING PROGRAM

## 2023-02-08 PROCEDURE — 80048 BASIC METABOLIC PNL TOTAL CA: CPT

## 2023-02-08 RX ORDER — CELECOXIB 100 MG/1
100 CAPSULE ORAL 2 TIMES DAILY
Qty: 60 CAPSULE | Refills: 3 | Status: SHIPPED | OUTPATIENT
Start: 2023-02-08

## 2023-02-08 RX ORDER — METHOCARBAMOL 500 MG/1
500 TABLET, FILM COATED ORAL 3 TIMES DAILY PRN
Qty: 30 TABLET | Refills: 0 | Status: SHIPPED | OUTPATIENT
Start: 2023-02-08 | End: 2023-02-18

## 2023-02-08 RX ORDER — ACETAMINOPHEN 325 MG/1
650 TABLET ORAL EVERY 6 HOURS
Qty: 120 TABLET | Refills: 3 | Status: SHIPPED | OUTPATIENT
Start: 2023-02-08

## 2023-02-08 RX ORDER — OXYCODONE HYDROCHLORIDE 5 MG/1
5 TABLET ORAL EVERY 4 HOURS PRN
Qty: 42 TABLET | Refills: 0 | Status: SHIPPED | OUTPATIENT
Start: 2023-02-08 | End: 2023-02-15

## 2023-02-08 RX ADMIN — MELOXICAM 7.5 MG: 7.5 TABLET ORAL at 08:16

## 2023-02-08 RX ADMIN — Medication 3000 MG: at 00:19

## 2023-02-08 RX ADMIN — CHLORTHALIDONE 25 MG: 25 TABLET ORAL at 08:16

## 2023-02-08 RX ADMIN — PROCHLORPERAZINE EDISYLATE 10 MG: 5 INJECTION INTRAMUSCULAR; INTRAVENOUS at 00:09

## 2023-02-08 RX ADMIN — ACETAMINOPHEN 325MG 650 MG: 325 TABLET ORAL at 10:11

## 2023-02-08 RX ADMIN — OXYCODONE HYDROCHLORIDE 5 MG: 5 TABLET ORAL at 15:03

## 2023-02-08 RX ADMIN — ACETAMINOPHEN 325MG 650 MG: 325 TABLET ORAL at 03:32

## 2023-02-08 RX ADMIN — OXYCODONE HYDROCHLORIDE 5 MG: 5 TABLET ORAL at 08:15

## 2023-02-08 RX ADMIN — PROCHLORPERAZINE EDISYLATE 10 MG: 5 INJECTION INTRAMUSCULAR; INTRAVENOUS at 08:08

## 2023-02-08 RX ADMIN — RIVAROXABAN 10 MG: 10 TABLET, FILM COATED ORAL at 08:16

## 2023-02-08 RX ADMIN — Medication 3000 MG: at 08:18

## 2023-02-08 RX ADMIN — DEXAMETHASONE SODIUM PHOSPHATE 8 MG: 10 INJECTION INTRAMUSCULAR; INTRAVENOUS at 10:11

## 2023-02-08 ASSESSMENT — PAIN SCALES - GENERAL
PAINLEVEL_OUTOF10: 4
PAINLEVEL_OUTOF10: 4
PAINLEVEL_OUTOF10: 0
PAINLEVEL_OUTOF10: 5
PAINLEVEL_OUTOF10: 3
PAINLEVEL_OUTOF10: 4
PAINLEVEL_OUTOF10: 5

## 2023-02-08 ASSESSMENT — PAIN DESCRIPTION - LOCATION
LOCATION: KNEE
LOCATION: KNEE

## 2023-02-08 ASSESSMENT — PAIN DESCRIPTION - DESCRIPTORS
DESCRIPTORS: ACHING
DESCRIPTORS: ACHING;DISCOMFORT

## 2023-02-08 ASSESSMENT — PAIN - FUNCTIONAL ASSESSMENT: PAIN_FUNCTIONAL_ASSESSMENT: ACTIVITIES ARE NOT PREVENTED

## 2023-02-08 ASSESSMENT — PAIN DESCRIPTION - ORIENTATION
ORIENTATION: RIGHT
ORIENTATION: RIGHT

## 2023-02-08 ASSESSMENT — PAIN DESCRIPTION - PAIN TYPE: TYPE: SURGICAL PAIN

## 2023-02-08 NOTE — OP NOTE
Operative Note      Patient: Nilda Martinez  YOB: 1985  MRN: [id number]    Date of Procedure: 2/7/2023    Pre-Op Diagnosis: RIGHT KNEE PRIMARY OSTEOARTHRITIS    Post-Op Diagnosis: Same       Procedure(s):  RIGHT TOTAL KNEE ARTHROPLASTY            ANGEL BIOMET NOTE: ADDUCTOR CANAL BLOCK, IPAC    Surgeon(s):  Paco Moody MD    Assistant:   Surgical Assistant: Joaquín Galvin    Anesthesia: General    Estimated Blood Loss (mL): 133     Complications: None    Specimens:   * No specimens in log *    Implants:  Implant Name Type Inv. Item Serial No.  Lot No. LRB No. Used Action   PSN FEM CR POR CCR NRW SZ7 R - [id number]  PSN FEM CR POR CCR NRW SZ7 R  [employer]- [id number] Right 1 Implanted   PSN TIB POR 2 PEG SZ E R - [id number]  PSN TIB POR 2 PEG SZ E R  [employer]- [id number] Right 1 Implanted   PSN MC VE ASF R 16MM 6-7/EF - [id number]  PSN MC VE ASF R 16MM 6-7/EF  [employer]WD [id number] Right 1 Implanted         Drains: * No LDAs found *    Findings: see below severe PF and medial OA    Detailed Description of Procedure:   Clinical Indications: This is a 41 y/o F that was evaluated for R knee OA by myself, noted to have failed extensive conservative measures and desired to have surgical intervention. Risks benefits limitations and alternatives to cementless R TKA were discussed with the patient who wished to proceed. The patient was met in the preoperative holding area last-minute questions were answered, the R knee was marked, consent was verified. An adductor canal and IPAC block were administered per anesthesia. Ancef and TXA were administered. The patient was taken to the OR where general anesthesia was administered. She was positioned in the supine position. The operative extremity was prepped and draped in standard orthopedic fashion. Timeout was performed according hospital protocol.      The operative extremity was exsanguinated and tourniquet inflated. Midline incision marked and made with scalpel. Dissection was carried down to the capsule with electrocautery. Median parapatellar arthrotomy was made with bovie. Medial release was performed. Once adequate exposure was achieved, attention was turned to the femur. The femoral canal was entered at the middle of the notch with the entry reamer. IM guide was inserted. In this case the medial worn distal femoral guide was utilized. Distal femoral resection was made, bone cuts were measured and were appropriately 8mm laterally, 6mm medially. We therefore proceeded to use the femoral sizer to measure a size 7 femur and set our rotation neutrally referencing the posterior condyles. Appropriate bone cuts were completed with the 4-1 block. The posterior condylar resections were noted to be appropriately 8mm medial and lateral prior to proceeding with the remainder of the cuts. Attention was then turned to the tibia. The tibial plateau was exposed and translated anteriorly. Once adequate exposure was achieved, the proximal tibial guide was used to resect the tibia, referencing the base of the tibial spines for equal resection medially and laterally and replicating native tibial slope. We then proceeded with meniscus resection and posterior osteophyte resection both medially and laterally. Once this was achieved I assessed our flexion and extension gaps. Additional tibia / femoral resection was performed as necessary until gaps were balanced appropriately. Patella was not resurfaced due to age, obesity and patella morphology Trial components were then placed. I did ensure the the tibial cut surface was perfectly flat. Satisfactory ligamentous balance, range of motion and patellar tracking were confirmed. The femoral drill pegs were drilled, tibia was prepared for the pegs     The wound was thoroughly irrigated with saline. Local anesthesia administered with orthomix.  Tourniquet was released. The tibia was exposed and the tibial component was firmly impacted followed by the femoral component. Adequate range of motion, stability and patellar tracking again confirmed. This was optimal with a 16 mm MC poly. Hemostasis was ensured. Wound was again throughly irrigated. Final poly was inserted. Closure then ensued in standard fashion with #2 stratafix for the capsule, 2-0 stratafix for the subQ and 3-0 stratafix for the skin. Perneo glue and Occlusive, compressive dressings were applied. The patient was then awoken from anesthesia and taken to PACU in stable condition. Post operatively the patient will have no activity or weight bearing restrictions. Pain control will be multimodal and minimize narcotics. They will take xarelto x 2 weeks for DVT ppx. Plan to discharge home when pain controlled, voiding on own and mobilizing well with physical therapy. Standard outpatient PT 2-3 times / week if patient has transportation available, will otherwise send referral for home PT.       Electronically signed by Michelle Hutchinson MD on 6/7/5361 at 7:01 PM

## 2023-02-08 NOTE — PROGRESS NOTES
I reviewed the chart and discussed the case with nursing, Raymon Kendall RN. There are no current medical needs and/or concerns at this time. Patient did have some nausea post op, but doing well at this time. If a need shall arise, please do not hesitate to contact us. Hospitalist group signing off. Thank you!   Emily Jackson CNP

## 2023-02-08 NOTE — PROGRESS NOTES
Discharge instructions reviewed with patient. Reviewed all post op instructions with patient. Extra dressing given. All questions answered. Meds to be picked up at [facility].

## 2023-02-08 NOTE — PROGRESS NOTES
Occupational Therapy         Orders received, chart reviewed. Patient with no acute OT needs this session, per physical therapy report. Will sign off. Thank you.   Electronically signed by Linda Suarez OT on 2/8/2023 at 12:32 PM

## 2023-02-08 NOTE — CARE COORDINATION
Case Management Assessment  Initial Evaluation    Date/Time of Evaluation: 2/8/2023 2:10 PM  Assessment Completed by: Alexandra Patel RN    If patient is discharged prior to next notation, then this note serves as note for discharge by case management. Patient Name: Angel Ivey                   YOB: 1985  Diagnosis: Primary osteoarthritis of right knee [M17.11]  S/P total knee arthroplasty, right [Z96.651]                   Date / Time: 2/7/2023  1:05 PM    Patient Admission Status: Observation   Readmission Risk (Low < 19, Mod (19-27), High > 27): No data recorded  Current PCP: Wilton Roland, DO  PCP verified by CM? Yes    Chart Reviewed: Yes      History Provided by: Patient  Patient Orientation: Alert and Oriented    Patient Cognition: Alert    Hospitalization in the last 30 days (Readmission):  No    If yes, Readmission Assessment in CM Navigator will be completed. Advance Directives:      Code Status: Full Code   Patient's Primary Decision Maker is: Patient Declined (Legal Next of Kin Remains as Decision Maker)      Discharge Planning:    Patient lives with: Alone Type of Home: Apartment  Primary Care Giver: Self  Patient Support Systems include: Parent   Current Financial resources:    Current community resources: None  Current services prior to admission: None            Current DME:              Type of Home Care services:  None    ADLS  Prior functional level: Independent in ADLs/IADLs  Current functional level: Independent in ADLs/IADLs    PT AM-PAC: 18 /24  OT AM-PAC:   /24    Family can provide assistance at DC: Yes  Would you like Case Management to discuss the discharge plan with any other family members/significant others, and if so, who? No  Plans to Return to Present Housing: Yes  Other Identified Issues/Barriers to RETURNING to current housing: lives alone  Potential Assistance needed at discharge:  Outpatient PT/OT            Potential DME:  rolling walker  Patient expects to discharge to: [address] [place] [address] for transportation at discharge:  personal  vehicle    Financial    Payor: [place] [employer] / Plan: Ashley Soares PPO / Product Type: *No Product type* /     Does insurance require precert for SNF: Yes    Potential assistance Purchasing Medications: No  [id number] [address],Suite [facility] [phone] Lambert Como [phone]  [address] [place] [address]  Phone: [phone] Fax: [phone]      Notes:    Factors facilitating achievement of predicted outcomes: Family support    Barriers to discharge: Stairs at home    Additional Case Management Notes: Patient lives alone mac n Apartment with 1 JAM. Patient is an active , and still works. Patient doing OP therapy. The Plan for Transition of Care is related to the following treatment goals of Primary osteoarthritis of right knee [M17.11]  S/P total knee arthroplasty, right [V22.420]    IF APPLICABLE: The Patient and/or patient representative Brennan Casarez and her family were provided with a choice of provider and agrees with the discharge plan. Freedom of choice list with basic dialogue that supports the patient's individualized plan of care/goals and shares the quality data associated with the providers was provided to: Patient   Patient Representative Name:       The Patient and/or Patient Representative Agree with the Discharge Plan?  Yes    Samantha Canas RN  Case Management Department  Ph: [phone] Fax: [phone]

## 2023-02-08 NOTE — PROGRESS NOTES
Physical Therapy  Facility/Department: [place] [address] - MED SURG/ORTHO  Physical Therapy Initial Assessment/Treatment    Name: Aisha Pimentel  : 1985  MRN: [id number]  Date of Service: 2023    Discharge Recommendations:  24 hour supervision or assist, Home with Home health PT, Outpatient PT   PT Equipment Recommendations  Equipment Needed: Yes  Mobility Devices: Kerri Bristle: Rolling      Patient Diagnosis(es): There were no encounter diagnoses. Past Medical History:  has a past medical history of Arthritis, Hyperlipidemia, Hypertension, Impaired fasting glucose, and Obesity. Past Surgical History:  has a past surgical history that includes knee surgery (Bilateral); other surgical history (N/A, 2018); and Knee arthroscopy (Right, 2020). Assessment   Body Structures, Functions, Activity Limitations Requiring Skilled Therapeutic Intervention: Decreased functional mobility ; Decreased ROM; Decreased balance;Decreased strength; Increased pain  Assessment: Pt [facility] for a R TKA. Pt has hx of previous surgeries on B knees. Pt reports that she lives alone in a 1-story apartment that has 1 JAM from the parking garage. Prior to surgery, she reports that she was IND with ADLs/IADLs and IND with mobility without AD. Currently, pt is performing bed mobility with MinAx1, transfers with SW and CGA and ambulation with SW and CGA/SBA. Pt was able to sucessfully negotiate 1 curb step with SW and CGA. Pt would continue to benefit from skilled PT in order to address functional deficits and enhance pt independence. Recommend home with 24hr superivision, HHPT/outpatient PT per physician orders, and RW for increased stability upon d/c.   Treatment Diagnosis: Impaired functional mobility  Therapy Prognosis: Fair  Decision Making: Medium Complexity  Requires PT Follow-Up: Yes  Activity Tolerance  Activity Tolerance: Patient tolerated evaluation without incident     Plan   Physcial Therapy Plan  General Plan: 2 times a day 7 days a week  Current Treatment Recommendations: Strengthening, ROM, Balance training, Functional mobility training, Endurance training, Gait training, Stair training, Home exercise program, Safety education & training, Therapeutic activities  Safety Devices  Type of Devices: Bed alarm in place, Call light within reach, Gait belt, Left in bed, Nurse notified  Restraints  Restraints Initially in Place: No     Restrictions  Restrictions/Precautions  Restrictions/Precautions: Weight Bearing, General Precautions  Lower Extremity Weight Bearing Restrictions  Right Lower Extremity Weight Bearing: Weight Bearing As Tolerated  Position Activity Restriction  Other position/activity restrictions: tele, IV     Subjective   Pain: Pt reports R knee pain 4/10  General  Chart Reviewed: Yes  Patient assessed for rehabilitation services?: Yes  Family / Caregiver Present: No  Referring Practitioner: Mary Galvan MD  Referral Date : 02/07/23  Diagnosis: R TKA  Follows Commands: Within Functional Limits  General Comment  Comments: RN cleared pt for PT eval  Subjective  Subjective: Arrived and pt supine, agreed to participate in PT eval     Social/Functional History  Social/Functional History  Lives With: Alone  Type of Home: Apartment  Home Layout: One level  Home Access: Stairs to enter without rails, Ramped entrance (from parking lot)  Entrance Stairs - Number of Steps: 1  Bathroom Shower/Tub: Tub only  Bathroom Toilet: Handicap height  Bathroom Accessibility: Walker accessible  Home Equipment: Crutches  Has the patient had two or more falls in the past year or any fall with injury in the past year?: No  Receives Help From: Family (mom lives close by will be around to help)  ADL Assistance: Independent  Homemaking Assistance: Independent  Homemaking Responsibilities: Yes  Ambulation Assistance: Independent  Transfer Assistance: Independent  Active : Yes  Occupation: Full time employment  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  Leisure & Hobbies: read, watch tv, walk  Vision/Hearing  Vision  Vision: Within Functional Limits  Hearing  Hearing: Within functional limits    Cognition   Orientation  Overall Orientation Status: Within Functional Limits  Orientation Level: Oriented X4     Objective   Heart Rate: 86  Heart Rate Source: Monitor  BP: 131/82  BP Location: Right upper arm  BP Method: Automatic  Patient Position: Semi fowlers  MAP (Calculated): 98  Resp: 18  SpO2: 93 %  O2 Device: None (Room air)     Gross Assessment  AROM: Generally decreased, functional (RLE limited in manuel flexion/extension)  Strength: Generally decreased, functional (RLE limited grossly due to pain)  Coordination: Within functional limits  Sensation: Intact     Bed Mobility Training  Bed Mobility Training: Yes  Overall Level of Assistance: Minimum assistance;Assist X1  Interventions: Verbal cues  Supine to Sit: Minimum assistance;Assist X1;Additional time (HOB elevated, assist with moving RLE)  Sit to Supine: Additional time;Assist X1;Minimum assistance (assist with RLE onto bed)  Scooting: Stand-by assistance (towards EOB)  Balance  Sitting: Intact  Standing: Impaired  Standing - Static: Constant support; Fair (with SW for UE support, increased weightshift onto LLE)  Standing - Dynamic: Constant support; Fair (with SW for UE support, limited WB on RLE)  Transfer Training  Transfer Training: Yes  Overall Level of Assistance: Contact-guard assistance  Interventions: Verbal cues; Safety awareness training  Sit to Stand: Contact-guard assistance; Adaptive equipment; Additional time (with SW, VC on hand placement)  Stand to Sit: Adaptive equipment; Additional time;Contact-guard assistance (with SW, VC on reaching back for bed/chair)  Gait Training  Gait Training: Yes  Right Side Weight Bearing: As tolerated  Gait  Overall Level of Assistance: Contact-guard assistance;Stand-by assistance;Assist X1;Additional time; Adaptive equipment (CGA progressing to SBA with SW)  Interventions: Verbal cues; Safety awareness training  Base of Support: Shift to left  Speed/Deidra: Slow  Step Length: Right shortened  Stance: Right decreased  Gait Abnormalities: Antalgic  Distance (ft): 20 Feet  Assistive Device: Walker;Gait belt  Stairs - Level of Assistance: Contact-guard assistance; Additional time; Adaptive equipment;Assist X1 (with SW, VC on sequencing)  Number of Stairs Trained: 1     Exercise Treatment: Pt performed supine ankle pumps, quad sets, SLR, glute sets x10 reps RLE     AM-PAC Score  AM-PAC Inpatient Mobility Raw Score : 18 (02/08/23 1135)  AM-PAC Inpatient T-Scale Score : 43.63 (02/08/23 1135)  Mobility Inpatient CMS 0-100% Score: 46.58 (02/08/23 1135)  Mobility Inpatient CMS G-Code Modifier : CK (02/08/23 1135)    Goals  Short Term Goals  Time Frame for Short Term Goals: 3 days (2/11/23)  Short Term Goal 1: Pt will perform bed mobility with SBA  Short Term Goal 2: Pt will perform transfers with RW and SBA  Short Term Goal 3: Pt will ambulate 50ft with RW and SBA  Short Term Goal 4: Pt will negotiate 1 curb step with RW and SBA  Patient Goals   Patient Goals : \"to walk with my walker with a little help\"     Education  Patient Education  Education Given To: Patient  Education Provided: Role of Therapy;Plan of Care;Home Exercise Program;Transfer Training;Equipment  Education Method: Verbal  Barriers to Learning: None  Education Outcome: Verbalized understanding;Demonstrated understanding  Provided pt education on appropriate DME following surgery. Pt verbalized understanding and agreed to use a RW for mobility to improve stability following surgery.      Patient Educated in safety with car transfers and  dispensed instruction on car transfers with use of assistive device with patient demonstrating:  [x] Verbalizing understanding of appropriate technique, maintaining any ordered precautions for car transfer training s/p TJR  [] [place] with simulated car transfer with walker  [] He/she requires assist and will have someone at discharge to help with transfers with patient verbalizing understanding of any ordered TJR precautions employing appropriate technique. [] Other: Educated patient in written car transfer instruction with patient verbalizing understanding of appropriate techniques. Therapy Time   Individual Concurrent Group Co-treatment   Time In 0958         Time Out 1025         Minutes 27         Timed Code Treatment Minutes: 17 Minutes (10 min eval)     If pt is unable to be seen after this session, please let this note serve as discharge summary. Please see case management note for discharge disposition. Thank you.     Ondina Lomas,SPT

## 2023-02-08 NOTE — PROGRESS NOTES
Pt going to be taking eliquis 2.5mg 1 tablet 2 times a day for 14 days. Pt verbalized understanding.

## 2023-02-08 NOTE — PROGRESS NOTES
Department of Orthopedic Surgery  Physician Assistant   Progress Note    Subjective:       Systemic or Specific Complaints: No complaints, pain is well controlled. Was able to work well with therapy.   Feels appropriate for discharge    Objective:     Patient Vitals for the past 24 hrs:   BP Temp Temp src Pulse Resp SpO2 Height Weight   02/08/23 1004 131/82 -- -- 86 -- 93 % -- --   02/08/23 0845 -- -- -- -- 18 -- -- --   02/08/23 0815 (!) 144/80 97.9 °F (36.6 °C) Oral 81 16 93 % -- --   02/08/23 0329 (!) 143/88 97.7 °F (36.5 °C) Oral 66 16 94 % -- --   02/08/23 0007 (!) 146/70 97.9 °F (36.6 °C) Oral 74 16 92 % -- --   02/07/23 2229 121/81 97.9 °F (36.6 °C) Oral 73 16 (!) 89 % -- --   02/07/23 2112 -- -- -- 77 -- 94 % -- --   02/07/23 2111 -- -- -- 70 -- (!) 80 % -- --   02/07/23 2110 -- -- -- 72 -- (!) 82 % -- --   02/07/23 2109 127/83 -- -- 67 -- 95 % -- --   02/07/23 2108 -- -- -- 66 -- (!) 86 % -- --   02/07/23 2107 -- -- -- 68 -- 92 % -- --   02/07/23 2106 -- -- -- 76 -- 94 % -- --   02/07/23 2105 -- -- -- 70 -- (!) 89 % -- --   02/07/23 2104 (!) 144/79 -- -- 69 -- 97 % -- --   02/07/23 2103 -- -- -- 68 -- 93 % -- --   02/07/23 2102 -- -- -- 75 -- 93 % -- --   02/07/23 2101 -- -- -- 70 -- 93 % -- --   02/07/23 2100 -- -- -- 69 -- 94 % -- --   02/07/23 2059 -- -- -- 79 -- 96 % -- --   02/07/23 2058 (!) 142/86 -- -- 69 -- 94 % -- --   02/07/23 2057 -- -- -- 69 -- 94 % -- --   02/07/23 2056 -- -- -- 77 -- 96 % -- --   02/07/23 2055 -- -- -- 77 -- 95 % -- --   02/07/23 2054 -- -- -- 72 -- 97 % -- --   02/07/23 2053 138/89 -- -- 70 -- 97 % -- --   02/07/23 2052 -- -- -- 80 -- 97 % -- --   02/07/23 2051 -- -- -- 75 -- 96 % -- --   02/07/23 2050 -- -- -- 77 -- 96 % -- --   02/07/23 2049 139/81 -- -- 75 -- 98 % -- --   02/07/23 2048 -- -- -- 66 -- 96 % -- --   02/07/23 2047 -- -- -- 68 -- 95 % -- --   02/07/23 2046 -- -- -- 75 -- 96 % -- --   02/07/23 2045 -- -- -- 75 -- 96 % -- --   02/07/23 2044 133/80 -- -- 71 -- 97 % -- --   02/07/23 2043 -- -- -- 74 -- 96 % -- --   02/07/23 2042 -- -- -- 69 -- 96 % -- --   02/07/23 2041 -- -- -- 79 -- (!) 87 % -- --   02/07/23 2040 -- -- -- 70 -- (!) 83 % -- --   02/07/23 2039 (!) 142/91 -- -- 70 -- (!) 88 % -- --   02/07/23 2038 -- -- -- 79 -- 96 % -- --   02/07/23 2037 -- -- -- 80 -- (!) 81 % -- --   02/07/23 2036 -- -- -- 74 -- 91 % -- --   02/07/23 2035 -- -- -- 80 -- 95 % -- --   02/07/23 2034 (!) 147/79 -- -- 77 -- (!) 89 % -- --   02/07/23 2033 -- -- -- 76 -- 95 % -- --   02/07/23 2032 -- -- -- 78 -- 93 % -- --   02/07/23 2031 -- -- -- 78 -- 93 % -- --   02/07/23 2030 -- -- -- 77 -- 94 % -- --   02/07/23 2029 (!) 142/83 -- -- 73 -- 92 % -- --   02/07/23 2028 -- -- -- 77 -- 94 % -- --   02/07/23 2027 -- -- -- 79 -- 94 % -- --   02/07/23 2026 -- -- -- 75 -- 96 % -- --   02/07/23 2025 -- -- -- 79 -- 97 % -- --   02/07/23 2024 (!) 153/88 -- -- -- -- 95 % -- --   02/07/23 2023 -- -- -- 81 -- 94 % -- --   02/07/23 2022 -- -- -- 82 -- 96 % -- --   02/07/23 2021 -- -- -- 80 -- 95 % -- --   02/07/23 2020 -- -- -- 78 -- 95 % -- --   02/07/23 2019 -- -- -- 77 -- 96 % -- --   02/07/23 2018 -- -- -- 78 -- 93 % -- --   02/07/23 2017 -- -- -- 80 -- 96 % -- --   02/07/23 2016 -- -- -- 78 -- 97 % -- --   02/07/23 2015 -- -- -- 77 -- 96 % -- --   02/07/23 2014 (!) 150/89 -- -- 76 -- 96 % -- --   02/07/23 2013 -- -- -- 78 -- 98 % -- --   02/07/23 2012 -- -- -- 73 -- 98 % -- --   02/07/23 2011 -- -- -- 76 -- 98 % -- --   02/07/23 2010 -- -- -- 71 -- 98 % -- --   02/07/23 2009 (!) 143/80 -- -- 76 -- 98 % -- --   02/07/23 2008 -- -- -- 77 -- 98 % -- --   02/07/23 2007 -- -- -- 77 -- 98 % -- --   02/07/23 2006 -- -- -- 75 -- 98 % -- --   02/07/23 2005 -- -- -- 75 -- 99 % -- --   02/07/23 2004 136/83 -- -- 75 -- 99 % -- --   02/07/23 2003 -- -- -- 71 -- 95 % -- --   02/07/23 2002 -- -- -- 76 -- 99 % -- --   02/07/23 2001 -- -- -- 75 -- (!) 85 % -- --   02/07/23 2000 -- -- -- 72 -- 98 % -- --   02/07/23 1959 136/80 -- -- 80 -- (!) 62 % -- --   02/07/23 1958 -- -- -- 73 -- 94 % -- --   02/07/23 1957 -- -- -- 80 -- 98 % -- --   02/07/23 1956 -- -- -- 83 -- 98 % -- --   02/07/23 1955 -- -- -- 82 -- 98 % -- --   02/07/23 1954 -- -- -- 80 -- 98 % -- --   02/07/23 1953 -- -- -- 80 -- 98 % -- --   02/07/23 1952 -- -- -- 81 -- 98 % -- --   02/07/23 1951 -- -- -- 83 -- 97 % -- --   02/07/23 1950 -- -- -- 86 -- 98 % -- --   02/07/23 1949 -- -- -- 85 -- 98 % -- --   02/07/23 1948 (!) 146/93 -- -- 81 -- 98 % -- --   02/07/23 1947 -- -- -- 83 -- 97 % -- --   02/07/23 1946 -- -- -- 80 -- 95 % -- --   02/07/23 1945 -- -- -- 80 -- (!) 85 % -- --   02/07/23 1944 -- -- -- 79 -- 92 % -- --   02/07/23 1943 137/86 -- -- 77 -- 91 % -- --   02/07/23 1942 -- -- -- 89 14 (!) 61 % -- --   02/07/23 1941 -- -- -- 79 10 95 % -- --   02/07/23 1940 -- -- -- 86 13 93 % -- --   02/07/23 1939 (!) 143/91 -- -- 86 16 90 % -- --   02/07/23 1938 -- -- -- -- -- (!) 86 % -- --   02/07/23 1337 132/85 98 °F (36.7 °C) Temporal 72 16 96 % 5' 3.75\" (1.619 m) (!) 317 lb (143.8 kg)       General: alert, appears stated age, cooperative, and no distress   Wound: Wound clean and dry no evidence of infection. and No Drainage to outside of dressing   Motion: Painful range of Motion in affected extremity   DVT Exam: No evidence of DVT seen on physical exam.  No cords or calf tenderness. No significant calf/ankle edema. Additional exam: Patient seen sitting up in bed at time of interview, postop dressing applied-clean, dry, intact.   No drainage outside of dressing  Nontender to palpation about the thigh and calf  EHL, FHL, gastroc, anterior tib motor intact  Sensation intact to light touch throughout entirety of right lower extremity  DP and PT pulse 2+    Data Review  CBC:   Lab Results   Component Value Date/Time    WBC 13.8 02/08/2023 05:49 AM    RBC 4.29 02/08/2023 05:49 AM    HGB 11.7 02/08/2023 05:49 AM    HCT 35.3 02/08/2023 05:49 AM     02/08/2023 05:49 AM       Renal:   Lab Results   Component Value Date/Time     02/08/2023 05:49 AM    K 4.2 02/08/2023 05:49 AM     02/08/2023 05:49 AM    CO2 25 02/08/2023 05:49 AM    BUN 18 02/08/2023 05:49 AM    CREATININE 0.7 02/08/2023 05:49 AM    GLUCOSE 180 02/08/2023 05:49 AM    CALCIUM 8.9 02/08/2023 05:49 AM         mL    Assessment:      right Total knee arthroplasty, postop day 1 with abductor canal block. Date of surgery 2/4/8021 by Dr. Grissom Chick:      1: Weightbearing as tolerated to the right lower extremity  2:  Continue Deep venous thrombosis prophylaxis-Eliquis 2.5 mg twice daily for 2 weeks postop  3:  Continue Pain Control-scheduled Tylenol, oxycodone as needed  4: 2 doses IV Ancef completed postoperatively  5: PT/OT recommending home with 24-hour supervision or assist, outpatient therapy. Rolling walker ordered  6: Appropriate for discharge.   DCP updated    Venkat Orosco

## 2023-02-08 NOTE — DISCHARGE INSTRUCTIONS
*** Please contact [address] with any questions or concerns after your discharge. *** Mon- Fri 9am- 5pm [phone]. If you have any issues or concerns after 5pm or on the weekend please call your surgeon's office. I will be contacting you in a few days to follow up. If you need a pain medication refill please contact your surgeon's office. Please contact [facility]opaedic Nurse Navigator with any questions or concerns after your discharge. Mon- Fri [address] [phone]. If you have any issues or concerns after 5pm or on the weekend please call your surgeon's office. I will be contacting you in a few days to follow up. If you need a pain medication refill please contact your surgeon's office. Dr. Jimmy Jaffe Total Knee Replacement  Discharge Instructions      Activity: The first week after surgery is all about Ice, Elevation and Rest!        [place] way to elevate knee above heart, while keeping the knee straight. Pillows should be placed under the FOOT and leg, such that the leg is held straight. You should feel stretching in the back of the knee. If there is too much pain add pillows under the knee, but the leg should be as straight as possible  Placing pillows under the knee only and keeping the knee bent will make it very difficult to get it straight with physical therapy. Use a walker when ambulating. Physical therapy.    Typically starts 10-14 days after surgery unless otherwise instructed  Referral placed to outpatient location discussed with surgeon or for home PT if you do not have transportation for outpatient PT    To prevent Clot formation, you have been placed on the following medication:  Xarelto 10mg Daily for two weeks post op    Surgical Site Care:  Keep dressing in place until follow up visit, Call the office if drainage  Showering is permitted with waterproof Mepilex dressing   Will remove dressing at first follow up appointment    Pain Medications  First and foremost you should take Tylenol and Celebrex as prescribed for baseline pain control  If you have medical reasons not to take either medicine they were not prescribed  You were also given Oxycodone as needed  This for is pain despite the other medications and is typically needed for the first 3-5 days after surgery  Be sure to drink plenty of fluids (recommend water) while taking narcotic pain medications to prevent constipation   You may take an over the counter laxative or stool softener as needed to prevent/treat constipation as well, we recommend Senokot S OTC. We recommend that you consider taking these medications the entire time you are taking pain medication. Cold packs/Ice packs/Machine  May be used as much as necessary to control swelling/inflammation/soreness. This is typically, approximately 20 minutes per hour    Contact [facility]'s office if  Increased redness, swelling, drainage of any kind as well as new onset fevers and or chills. These could signify an infection. Calf or thigh tenderness to touch as well as increased swelling or redness. This could signify a clot formation. Any rash appears, increased  or new onset nausea/vomiting occur. This may indicate a reaction to a medication. Phone # [phone]. [address] and Sports Medicine. Follow up with Surgeon at scheduled appointment time.

## 2023-02-08 NOTE — DISCHARGE SUMMARY
Department of Orthopedic Surgery  Physician Assistant   Discharge Summary    The Jesse Lopez is a 42 y.o. female underwent total knee replacement procedure without complication. Jesse Lopez was admitted to the floor following Her recovery in the PACU. Discharge Diagnosis  Right total Knee Replacement    Current Inpatient Medications    No current facility-administered medications for this encounter. Post-operatively the patients diet was advanced as tolerated and their incision was checked on POD #1. The incision is dressing in place, clean, dry, and intact with no signs of infection. The patient remained neurovascularly intact in the lower extremity and had intact pulses distally. Patients calf remained soft and showed no evidence of DVT. The patient was able to move their leg and ankle/foot without any problems post-operatively. Physical therapy and occupational therapy were consulted and began working with the patient post-operatively. The patient progressed with PT/OT as would be expected and continued to improve through their stay. The patients pain was initially controlled with IV medications but we were able to transition to oral pain medications soon after arrival to the floor and their pain remained under good control through their hospital stay. From a medical standpoint the patient remained stable and continued to have the medicine team follow throughout their stay. The patients dressing was changed/incison was checked on day of d/c. The patient will be discharged at this time to Home  with their current diet restrictions and will continue to follow the total knee precautions outlined to them by us and PT/OT. Condition on Discharge: Stable    Plan  Return visit in 2 weeks. .  Patient was instructed on the use of pain medications, the signs and symptoms of infection, and was given our number to call should they have any questions or concerns following discharge. For opioid prescriptions given at discharge the following statement is provided for compliance with [employer] rules. Patient being given increased dosage/quantity of opoid pain medication in excess of [employer] guidelines which noted a 30 MED daily of opioids due to the fact that he/she has undergone major orthopaedic surgery as outlined in rule 4731-11-13. Dosages and further duration of the pain medication will be re-evaluated at her post op visit in 2 weeks. Patient was educated on dosing expectations and limits of prescribing as a result of the new [facility] Board rules enacted August 31, 2017. Please also note that this is not the initial opoid prescription issued to this patient but a continuation of medication utilized during the hospital admission as noted in the medical record. [place]ARRS report has also been utilized to screen for any abuse history or suspicious activity as outlined in [place]. All efforts have been taken to prevent abuse potential and misuse of opioid medications including education, screening, and close clinical follow up.

## 2023-02-09 ENCOUNTER — TELEPHONE (OUTPATIENT)
Dept: ORTHOPEDIC SURGERY | Age: 38
End: 2023-02-09

## 2023-02-09 ENCOUNTER — CARE COORDINATION (OUTPATIENT)
Dept: CASE MANAGEMENT | Age: 38
End: 2023-02-09

## 2023-02-09 NOTE — CARE COORDINATION
[facility] Care Transitions Initial Follow Up Call    Call within 2 business days of discharge: Yes    Patient Current Location: [address] Transition Nurse contacted the patient by telephone to perform post hospital discharge assessment. Verified name and  with patient as identifiers. Provided introduction to self, and explanation of the Care Transition Nurse role. Patient: Graciela Hernandez Patient : 1985   MRN: [id number]  Reason for Admission: s/p RTKR  Discharge Date: 23 RARS: No data recorded    Last Discharge  Street       Date Complaint Diagnosis Description Type Department Provider    23  S/P total knee arthroplasty, right Admission (Discharged) Nohemi Alvarez MD            Was this an external facility discharge? No Discharge Facility:     Challenges to be reviewed by the provider   Additional needs identified to be addressed with provider: No  none               Method of communication with provider: none. Attempted to reach patient via phone for initial post hospital transition call. VM left stating purpose of call along with my contact information requesting a return call. Patient returned call to CTN. Pleasant and agreeable to transition call. Stated that she is doing really well since discharge. AVS available and discussed instructions. Taking all medication as directed, but has not needed to take narcotic pain medication today. Stated she has been pretty active today in her home and had to change the dressing. Patient stated that she was given Mepilex dressings and changed it this morning. Old dressing had large amounts of dried blood and wanted a clean dressing. Tolerated change without difficulty. Denied any acute needs at present time. CTN closed transition episode and follow up scheduled for ortho nurse [facility]. Educated on the use of urgent care or physicians 24 hr access line if assistance is needed after hours.         Care Transition Nurse reviewed discharge instructions and medical action plan with patient who verbalized understanding. The patient was given an opportunity to ask questions and does not have any further questions or concerns at this time. Were discharge instructions available to patient? Yes. Reviewed appropriate site of care based on symptoms and resources available to patient including: PCP  Specialist. The patient agrees to contact the PCP office for questions related to their healthcare. Advance Care Planning:   Does patient have an Advance Directive: not on file. Medication reconciliation was performed with patient, who verbalizes understanding of administration of home medications. Medications reviewed, 1111F entered: yes    Was patient discharged with a pulse oximeter? no    Non-face-to-face services provided:  Obtained and reviewed discharge summary and/or continuity of care documents    Offered patient enrollment in the Remote Patient Monitoring (RPM) program for in-home monitoring: Patient is not eligible for RPM program.    Care Transitions 24 Hour Call    Do you have a copy of your discharge instructions?: Yes  Do you have all of your prescriptions and are they filled?: Yes  Have you been contacted by a [facility] Pharmacist?: No  Have you scheduled your follow up appointment?: Yes  How are you going to get to your appointment?: Car - family or friend to transport  Do you feel like you have everything you need to keep you well at home?: Yes  Are you an active caregiver in your home?: No  Care Transitions Interventions         Discussed follow-up appointments. If no appointment was previously scheduled, appointment scheduling offered: Yes. Is follow up appointment scheduled within 7 days of discharge? Yes.     Follow Up  Future Appointments   Date Time Provider Kunal Conley   2/22/2023 03:10 AM [facility]   4/19/2023  8:00 AM [address],  [facility]       Care Transition Nurse provided contact information. No further follow-up call indicated based on severity of symptoms and risk factors.     Isrrael Morales RN

## 2023-02-13 NOTE — TELEPHONE ENCOUNTER
Spoke with pt, doing surprisingly well. Not sleeping well, but doing ok. Getting around really well. Still having numbness on the top of the foot and spots in ankle area. Pt did message office. RN will defer to Dr. Celestino Jeans office. Incision status: No drainage or redness    Edema/Swelling/Teds: Having a decent amount of swelling. Has been icing and elevating leg. Has gone down significantly since Tuesday. Pain level and status: Managing really well. Use of pain medications: Using tylenol only now. Blood thinner: Eliquis daily with no issues. Bowels: Bowels are moving just fine. Home Care Agency active: NA    Outpatient therapy: NA    Do you have all of your medications: Yes    Changes in medications: no    Instructed pt to call Nurse Navigator or surgeon's office with any questions or concerns. Signed off from pt. Instructed pt to call RN or Surgeon's office with any issues or concerns.       Follow up appointments:    Future Appointments   Date Time Provider Kunal Conley   2/22/2023 26:05 AM [facility] Al [facility]   4/19/2023  8:00 AM Lisette Isidro, DO YUNI HANLEY [facility]

## 2023-02-22 ENCOUNTER — OFFICE VISIT (OUTPATIENT)
Dept: ORTHOPEDIC SURGERY | Age: 38
End: 2023-02-22

## 2023-02-22 ENCOUNTER — HOSPITAL ENCOUNTER (OUTPATIENT)
Dept: PHYSICAL THERAPY | Age: 38
Setting detail: THERAPIES SERIES
Discharge: HOME | End: 2023-02-22
Payer: COMMERCIAL

## 2023-02-22 VITALS — BODY MASS INDEX: 51.91 KG/M2 | WEIGHT: 293 LBS | HEIGHT: 63 IN

## 2023-02-22 DIAGNOSIS — Z96.651 S/P TOTAL KNEE ARTHROPLASTY, RIGHT: Primary | ICD-10-CM

## 2023-02-22 PROCEDURE — 99024 POSTOP FOLLOW-UP VISIT: CPT | Performed by: STUDENT IN AN ORGANIZED HEALTH CARE EDUCATION/TRAINING PROGRAM

## 2023-02-22 RX ORDER — CELECOXIB 100 MG/1
100 CAPSULE ORAL 2 TIMES DAILY
Qty: 60 CAPSULE | Refills: 0 | Status: SHIPPED | OUTPATIENT
Start: 2023-02-22

## 2023-02-22 NOTE — LETTER
[facility]  [address] [facility] [address]  Phone: [phone]  Fax: [phone]    Kandy Brown MD        February 22, 2023     Patient: Deanna Casper   YOB: 1985   Date of Visit: 2/22/2023       To Whom It May Concern: It is my medical opinion that Geneva Anderson may return to work on February 27, 2023. Patient may return to work but may not be able to return full duty. She can return to work from home as tolerated. If you have any questions or concerns, please don't hesitate to call.     Sincerely,        Kandy Brown MD

## 2023-02-22 NOTE — PROGRESS NOTES
History: Brenda Roblero presents for 2-week follow-up visit following right total knee arthroplasty. She has been recovering well at home. Working on knee extension and gentle range of motion exercises. Pain and swelling of been controlled. No longer requiring oxycodone medication. Plan to start physical therapy at [place] today. Exam: Range of motion is completely short of full extension to 90 at this time. Incisions well approximated without erythema or drainage. She is neurovascular intact distally. Imaging: Postop x-rays reviewed with patient    Assessment: 25-year-old female 2 weeks status post right total knee arthroplasty recovering well so far. Plan: She is right on track to start physical therapy. I advised her she can stop her DVT prophylaxis this point. She will continue Celebrex and Tylenol as needed for pain control. We will follow-up in about a month for a final range of motion check. Okay to cancel appointment if patient is met goals and doing well.     Lay Serna MD

## 2023-02-24 ENCOUNTER — HOSPITAL ENCOUNTER (OUTPATIENT)
Dept: PHYSICAL THERAPY | Age: 38
Setting detail: THERAPIES SERIES
Discharge: HOME | End: 2023-02-24
Payer: COMMERCIAL

## 2023-02-24 PROCEDURE — 97140 MANUAL THERAPY 1/> REGIONS: CPT | Performed by: PHYSICAL THERAPIST

## 2023-02-24 PROCEDURE — 97110 THERAPEUTIC EXERCISES: CPT | Performed by: PHYSICAL THERAPIST

## 2023-02-24 PROCEDURE — 97016 VASOPNEUMATIC DEVICE THERAPY: CPT | Performed by: PHYSICAL THERAPIST

## 2023-02-26 NOTE — FLOWSHEET NOTE
[facility] and [address],  [facility], [place] [id number] [place] [address],[address]   [facility], [address] [place] [address]  Phone: [phone]  Fax: [phone]     Physical Therapy Treatment Note/ Progress Report:           Date:  2023    Patient Name:  Cuba Velazquez    :  1985  MRN: [id number]  Restrictions/Precautions:    Medical/Treatment Diagnosis Information:    M17.11 (ICD-10-CM) - Primary osteoarthritis of right knee       M25.561 - right knee pain                                                Insurance/Certification information:     Physician Information:     Has the plan of care been signed (Y/N):        []  Yes  [x]  No     Date of Patient follow up with Physician:       Is this a Progress Report:     []  Yes  [x]  No        If Yes:  Date Range for reporting period:  Beginning  Ending    Progress report will be due (10 Rx or 30 days whichever is less):        Recertification will be due (POC Duration  / 90 days whichever is less):          Visit # Insurance Allowable Auth Required   1  []  Yes []  No        Functional Scale:     Date assessed:        Latex Allergy:  [x]NO      []YES  Preferred Language for Healthcare:   [x]English       []other:      Pain level:  /10     SUBJECTIVE:  See eval    OBJECTIVE: See eval  Observation:   Test measurements:      RESTRICTIONS/PRECAUTIONS:     Exercises/Interventions:       Therapeutic Ex (45669) Sets/sec Reps Notes/CUES   Seated hamstring stretch   4 x 20\"    Quad sets   20 x 10\"    Saq/laq  30x each     Slr's (flex, abd)  25x    Eob knee flexion   20\" x 7                                              Manual Intervention ([id number])      Prom/stm   14                                                                                                                                            Therapeutic Exercise and NMR EXR  [x] (60928) Provided verbal/tactile cueing for activities related to strengthening, flexibility, endurance, ROM for improvements in LE, proximal hip, and core control with self care, mobility, lifting, ambulation. [x] (37276) Provided verbal/tactile cueing for activities related to improving balance, coordination, kinesthetic sense, posture, motor skill, proprioception to assist with LE, proximal hip, and core control in self-care, mobility, lifting, ambulation and eccentric single leg control.      NMR and Therapeutic Activities:    [x] (00958 or 24593) Provided verbal/tactile cueing for activities related to improving balance, coordination, kinesthetic sense, posture, motor skill, proprioception and motor activation to allow for proper function of core, proximal hip and LE with self-care and ADLs and functional mobility.   [] (03669) Gait Re-education- Provided training and instruction to the patient for proper LE, core and proximal hip recruitment and positioning and eccentric body weight control with ambulation re-education including up and down stairs     Home Exercise Program:    [x] (71123) Reviewed/Progressed HEP activities related to strengthening, flexibility, endurance, ROM of core, proximal hip and LE for functional self-care, mobility, lifting and ambulation/stair navigation   [] (27230) Reviewed/Progressed HEP activities related to improving balance, coordination, kinesthetic sense, posture, motor skill, proprioception of core, proximal hip and LE for self-care, mobility, lifting, and ambulation/stair navigation      Manual Treatments:  PROM / STM / Oscillations-Mobs:  G-I, II, III, IV (PA's, Inf., Post.)  [x] (11732) Provided manual therapy to mobilize LE, proximal hip and/or LS spine soft tissue/joints for the purpose of modulating pain, promoting relaxation, increasing ROM, reducing/eliminating soft tissue swelling/inflammation/restriction, improving soft tissue extensibility and allowing for proper ROM for normal function with self-care, mobility, lifting and ambulation. Modalities:     [] GAME READY (VASO)- for significant edema, swelling, pain control. Charges:  Timed Code Treatment Minutes: 35'   Total Treatment Minutes: 79'      [x] EVAL (LOW) 17709 (typically 20 minutes face-to-face)  [] EVAL (MOD) 90187 (typically 30 minutes face-to-face)  [] EVAL (HIGH) 99525 (typically 45 minutes face-to-face)  [] RE-EVAL     [x] PA(70537) x   1  [] IONTO  [] NMR (34638) x     [] VASO  [x] Manual (89165) x    1 [] Other:  [] TA x      [] Mech Traction (39000)  [] ES(attended) (52838)      [] ES (un) (72083):         ASSESSMENT:  See eval      GOALS:   Patient stated goal:     [] Progressing: [] Met: [] Not Met: [] Adjusted    Therapist goals for Patient:   Short Term Goals: To be achieved in: 2 weeks  1. Independent in HEP and progression per patient tolerance, in order to prevent re-injury. [] Progressing: [] Met: [] Not Met: [] Adjusted   2. Patient will have a decrease in pain to facilitate improvement in movement, function, and ADLs as indicated by Functional Deficits. [] Progressing: [] Met: [] Not Met: [] Adjusted    Long Term Goals: To be achieved in:   12 weeks  - The patient is expected to demonstrate less than 30% impairment, limitation or restriction in:  - walking and moving around (mobility)  - Patient will demonstrate increased passive and active ROM to 0-130 degrees to allow for proper joint functioning as indicated by patients Functional Deficits. - Patient will demonstrate an increase in Strength to 4/5 strength to allow for proper functional mobility as indicated by patients Functional Deficits. - Patient will return to desired, higher level,  functional activities without increased symptoms or restriction. -only mild qualitative deviations with unassisted gait on level surface.        Overall Progression Towards Functional goals/ Treatment Progress Update:  [] Patient is progressing as expected towards functional goals listed. [] Progression is slowed due to complexities/Impairments listed. [] Progression has been slowed due to co-morbidities. [x] Plan just implemented, too soon to assess goals progression <30days   [] Goals require adjustment due to lack of progress  [] Patient is not progressing as expected and requires additional follow up with physician  [] Other    Prognosis for POC: [x] Good [] Fair  [] Poor      Patient requires continued skilled intervention: [x] Yes  [] No    Treatment/Activity Tolerance:  [x] Patient able to complete treatment  [] Patient limited by fatigue  [] Patient limited by pain    [] Patient limited by other medical complications  [] Other:         Return to Play: (if applicable)   []  Stage 1: Intro to Strength   []  Stage 2: Return to Run and Strength   []  Stage 3: Return to Jump and Strength   []  Stage 4: Dynamic Strength and Agility   []  Stage 5: Sport Specific Training     []  Ready to Return to Play, Meets All Above Stages   []  Not Ready for Return to Sports   Comments:                               PLAN: See eval  [] Continue per plan of care [] Alter current plan (see comments above)  [x] Plan of care initiated [] Hold pending MD visit [] Discharge      Electronically signed by:  Eduar Keen PT, MPT     Note: If patient does not return for scheduled/ recommended follow up visits, this note will serve as a discharge from care along with most recent update on progress.

## 2023-02-26 NOTE — PLAN OF CARE
The [facility] and [address],  Sports Performance and Rehabilitation, Count includes the [facility] [address]  [address] [facility],5Th Floor   Cm Sanabria  Phone: [phone]  Fax: [phone]                                                        Physical Therapy Certification    Dear Dr Shweta Kelley ,    We had the pleasure of evaluating the following patient for physical therapy services at [address] [place] [address]. A summary of our findings can be found in the initial assessment below. This includes our plan of care. If you have any questions or concerns regarding these findings, please do not hesitate to contact me at the office phone number checked above. Thank you for the referral.       Physician Signature:_______________________________Date:__________________  By signing above (or electronic signature), therapists plan is approved by physician      Patient: Ignacia Man   : 1985   MRN: [id number]  Referring Physician:        Evaluation Date: 2023      Medical Diagnosis Information:    M17.11 (ICD-10-CM) - Primary osteoarthritis of right knee       M25.561 - right knee pain                                             Insurance information:       Precautions/ Contra-indications:   Latex Allergy:  [x]NO      []YES  Preferred Language for Healthcare:   [x]English       []Other:  C-SSRS Triggered by Intake questionnaire (Past 2 wk assessment):   [x] No, Questionnaire did not trigger screening.   [] Yes, Patient intake triggered further evaluation      [] C-SSRS Screening completed  [] PCP notified via Plan of Care  [] Emergency services notified      SUBJECTIVE: Patient stated complaint: reports having a unicompartmental replacement .       Relevant Medical History:  Arthritis  left knee    Hyperlipidemia      Hypertension         Other Medical History    Diagnosis Date Comment Source   Impaired fasting glucose      Obesity           Functional Disability Index:  75% lefs     Pain Scale: 7/10    Easing factors:rest      Provocative factors:  motion/activity     Night Pain:    - complained of night pain associated with sleep position. 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 Sport/recreational activities:     - resistive training   - cardiovascular training         PACEMAKER:  - Denied having a pacemaker that would contraindicate the use of electrical modalities. METAL IMPLANTS:  - Denied metal implants that would contraindicate the use of thermal modalities. CANCER HISTORY:  - Denied a history of cancer that would contraindicate thermal modalities. OBJECTIVE:        Joint mobility:     Hypo      Palpation:     Functional Mobility/Transfers:     Posture:     Circumferential Measures:    ROM:  prom: -8 to 65    Strength/Neuromuscular control:   3-/5 right knee     Bandages/Dressings/Incisions:     Gait: moderately decreased right stance time (with FWW)    Dermatomal Sensation:  - Dermatomal sensation was intact to light touch bilaterally throughout upper and lower extremities. Deep tendon reflexes:  - DTR's were symmetrical and intact throughout. Orthopedic Special Tests:                [x] Patient history, allergies, meds reviewed. Medical chart reviewed. See intake form. Review Of Systems (ROS):  [x]Performed Review of systems (Integumentary, CardioPulmonary, Neurological) by intake and observation. Intake form has been scanned into medical record.  Patient has been instructed to contact their primary care physician regarding ROS issues if not already being addressed at this time. Objective Review of Systems:  Cognitive, Communication, Behavior and Learning:  - The patient was alert, spoke coherently and was oriented to person, place and time. - Demonstrated no barriers to communication or processing information.  - Appeared literate, spoke [place] and had no significant hearing or vision impairment. - Had no abnormal behavioral responses, learning preferences or educational needs. Cardiopulmonary:  Edema:     Integumentary:  Nail beds:  Skin appearance:    Co-morbidities/Complexities (which may affect course of rehabilitation):   []Morbid obesity (E66.01)   []Uncontrolled HTN (I10) []DM type 1(E10.65) or 2 (E11.65) []RA(M05.9)/OA(M19.91)  []None  []other:    MEDICARE CAP EXCEPTION DOCUMENTATION  I certify that this patient meets one of the below criteria necessary for becoming an exception to the Medicare cap on therapy services:  []  The patient has a condition that has a direct and significant impact on the need for therapy. (Significantly impacts the rate of recovery)  []  The patient has a complexity that has a direct and significant impact on the need for therapy. (Significantly impacts the rate of recovery and is associated with a primary condition.)       []  The patient has associated variables that influence the amount of treatment to include:  Social support, self-efficacy/motivation, prognosis, time since onset/acuity. []  The patient has generalized musculoskeletal conditions or a condition affecting multiple sites that will have a direct impact on the rate of recovery. []  The patient had a prior episode of outpatient therapy during this calendar year for a different condition. []  The patient has a mental or cognitive disorder in addition to the condition being treated that will have a direct and significant impact on the rate of recovery.      Falls Risk Assessment (30 days):   [] Falls Risk assessed and no intervention required. [] Falls Risk assessed and Patient requires intervention due to being higher risk   TUG score (>12s at risk):     [] Falls education provided, including       G-Codes:       ASSESSMENT:    The patient demonstrated at least % but less than % impairment, limitation or restriction in:   - walking and moving around (mobility)   Functional Impairments:     [x]Noted lumbar/proximal hip/LE hypomobility   [x]Decreased LE functional ROM   [x]Decreased core/proximal hip strength and neuromuscular control   [x]Decreased LE functional strength   [x]Reduced balance/proprioceptive control   []other:      Functional Activity Limitations (from functional questionnaire and intake)   [x]Reduced ability to tolerate prolonged functional positions   [x]Reduced ability or difficulty with changes of positions or transfers between positions   [x]Reduced ability to maintain good posture and demonstrate good body mechanics with sitting, bending, and lifting   [x]Reduced ability to sleep   [x] Reduced ability or tolerance with driving and/or computer work   [x]Reduced ability to perform lifting, carrying tasks   [x]Reduced ability to squat   [x]Reduced ability to forward bend   [x]Reduced ability to ambulate prolonged functional periods/distances/surfaces   [x]Reduced ability to ascend/descend stairs   [x]Reduced ability to run, hop or jump    Participation Restrictions   [x]Reduced participation in self care activities   [x]Reduced participation in home management activities   [x]Reduced participation in work activities   [x]Reduced participation in social activities. [x]Reduced participation in sport activities. Classification :    [x]Signs/symptoms consistent with post-surgical status including decreased ROM, strength and function.    []Signs/symptoms consistent with joint sprain/strain   []Signs/symptoms consistent with patella-femoral syndrome   []Signs/symptoms consistent with knee OA/hip OA   []Signs/symptoms consistent with internal derangement of knee/Hip   []Signs/symptoms consistent with functional hip weakness/NMR control      []Signs/symptoms consistent with tendinitis/tendinosis    []signs/symptoms consistent with pathology which may benefit from Dry needling      []other:    Classification :   - Signs/symptoms consistent with post-surgical status including decreased ROM, strength and function.  - Signs/symptoms consistent with functional hip weakness/NMR control   - impaired joint mobility, motor function, muscle performance and range of motion associated with:  - joint arthroplasty. (Pattern 4H)    - ligament or other connective tissue dysfunction. (Pattern 4D)  - localized inflammation. (Pattern 4E)        Prognosis/Rehab Potential:       - Good        Factors affecting rehab potential:  - associated co-morbidities      Tolerance of evaluation/treatment:     - Good     Physical Therapy Evaluation Complexity Justification  [] A history of present problem with:  [] no personal factors and/or comorbidities that impact the plan of care;  []1-2 personal factors and/or comorbidities that impact the plan of care  []3 personal factors and/or comorbidities that impact the plan of care  [x] An examination of body systems using standardized tests and measures addressing any of the following: body structures and functions (impairments), activity limitations, and/or participation restrictions;:  [x] a total of 1-2 or more elements   [] a total of 3 or more elements   [] a total of 4 or more elements   [x] A clinical presentation with:  [] stable and/or uncomplicated characteristics   [x] evolving clinical presentation with changing characteristics  [] unstable and unpredictable characteristics;   [x] Clinical decision making of [x] low, [] moderate, [] high complexity using standardized patient assessment instrument and/or measurable assessment of functional outcome.     [x] EVAL (LOW) 35503 (typically 30 minutes face-to-face)  [] EVAL (MOD) 68220 (typically 30 minutes face-to-face)  [] EVAL (HIGH) 70677 (typically 45 minutes face-to-face)  [] RE-EVAL         Co-morbidities/Complexities (which will affect course of rehabilitation):   []None           Arthritic conditions   []Rheumatoid arthritis (M05.9)  [x]Osteoarthritis (M19.91)   Cardiovascular conditions   []Hypertension (I10)  []Hyperlipidemia (E78.5)  []Angina pectoris (I20)  []Atherosclerosis (I70)   Musculoskeletal conditions   []Disc pathology   []Congenital spine pathologies   []Prior surgical intervention  []Osteoporosis (M81.8)  []Osteopenia (M85.8)   Endocrine conditions   []Hypothyroid (E03.9)  []Hyperthyroid Gastrointestinal conditions   []Constipation (N46.88)   Metabolic conditions   []Morbid obesity (E66.01)  []Diabetes type 1(E10.65) or 2 (E11.65)   []Neuropathy (G60.9)     Pulmonary conditions   []Asthma (J45)  []Coughing   []COPD (J44.9)   Psychological Disorders  []Anxiety (F41.9)  []Depression (F32.9)   []Other:   []Other:          PLAN  Frequency/Duration:  2 days per week for 10 Weeks:  Interventions:  - Therapeutic exercise including: strength training, ROM/flexibility, NMR and proprioception for the proximal hip, trunk and Lower extremity  - Manual therapy as indicated including Dry Needling/IASTM, STM, PROM, Gr I-IV mobilizations, spinal mobilization/manipulation.   - Modalities as needed including: thermal agents, E-stim, US, iontophoresis as indicated. - Patient education on joint protection, activity modification, progression of HEP. HEP instruction:     GOALS:  Patient stated goal:     Therapist goals for Patient:   Short Term Goals: To be achieved in: 2 weeks  - Independent in HEP and progression per patient tolerance, in order to prevent re-injury. - Patient will have a decrease in pain to facilitate improvement in movement, function, and ADLs as indicated by Functional Deficits. Long Term Goals:  To be achieved in: 12 weeks  - The patient is expected to demonstrate less than 30% impairment, limitation or restriction in:  - walking and moving around (mobility)  - Patient will demonstrate increased passive and active ROM to 0-130 degrees to allow for proper joint functioning as indicated by patients Functional Deficits. - Patient will demonstrate an increase in Strength to 4/5 strength to allow for proper functional mobility as indicated by patients Functional Deficits. - Patient will return to desired, higher level,  functional activities without increased symptoms or restriction. -only mild qualitative deviations with unassisted gait on level surface.       Electronically signed by:  Reinaldo Pat PT, MPT

## 2023-02-27 NOTE — FLOWSHEET NOTE
The [facility] and [address],  Sports Performance and Rehabilitation, [facility] 99 [address] [place] [address]  793 [facility],5[address] Floor   Cm Sanabria  Phone: [phone]  Fax: [phone]     Physical Therapy Treatment Note/ Progress Report:           Date:  2023    Patient Name:  Nettie Ferraro    :  1985  MRN: [id number]  Restrictions/Precautions:    Medical/Treatment Diagnosis Information:    M17.11 (ICD-10-CM) - Primary osteoarthritis of right knee       M25.561 - right knee pain                                                Insurance/Certification information:     Physician Information:     Has the plan of care been signed (Y/N):        []  Yes  [x]  No     Date of Patient follow up with Physician:       Is this a Progress Report:     []  Yes  [x]  No        If Yes:  Date Range for reporting period:  Beginning  Ending    Progress report will be due (10 Rx or 30 days whichever is less):        Recertification will be due (POC Duration  / 90 days whichever is less):          Visit # Insurance Allowable Auth Required   2  []  Yes []  No        Functional Scale:     Date assessed:        Latex Allergy:  [x]NO      []YES  Preferred Language for Healthcare:   [x]English       []other:      Pain level:  /10     SUBJECTIVE:  \"doing ok\"    OBJECTIVE: 97 degrees flexion   Observation:   Test measurements:      RESTRICTIONS/PRECAUTIONS:     Exercises/Interventions:       Therapeutic Ex (42514) Sets/sec Reps Notes/CUES   Seated hamstring stretch   4 x 20\"    Quad sets   20 x 10\"    Saq/laq  30x each     Slr's (flex, abd)  25x    Eob knee flexion   20\" x 7    ERMI  10'    Reformer   10'                                  Manual Intervention ([id number])      Prom/stm   14                                                                                                                                            Therapeutic Exercise and NMR EXR  [x] (71977) Provided verbal/tactile cueing for activities related to strengthening, flexibility, endurance, ROM for improvements in LE, proximal hip, and core control with self care, mobility, lifting, ambulation. [x] (82777) Provided verbal/tactile cueing for activities related to improving balance, coordination, kinesthetic sense, posture, motor skill, proprioception to assist with LE, proximal hip, and core control in self-care, mobility, lifting, ambulation and eccentric single leg control.      NMR and Therapeutic Activities:    [x] (41398 or 01248) Provided verbal/tactile cueing for activities related to improving balance, coordination, kinesthetic sense, posture, motor skill, proprioception and motor activation to allow for proper function of core, proximal hip and LE with self-care and ADLs and functional mobility.   [] (29733) Gait Re-education- Provided training and instruction to the patient for proper LE, core and proximal hip recruitment and positioning and eccentric body weight control with ambulation re-education including up and down stairs     Home Exercise Program:    [x] (51125) Reviewed/Progressed HEP activities related to strengthening, flexibility, endurance, ROM of core, proximal hip and LE for functional self-care, mobility, lifting and ambulation/stair navigation   [] (97724) Reviewed/Progressed HEP activities related to improving balance, coordination, kinesthetic sense, posture, motor skill, proprioception of core, proximal hip and LE for self-care, mobility, lifting, and ambulation/stair navigation      Manual Treatments:  PROM / STM / Oscillations-Mobs:  G-I, II, III, IV (PA's, Inf., Post.)  [x] (15662) Provided manual therapy to mobilize LE, proximal hip and/or LS spine soft tissue/joints for the purpose of modulating pain, promoting relaxation, increasing ROM, reducing/eliminating soft tissue swelling/inflammation/restriction, improving soft tissue extensibility and allowing for proper ROM for normal function with self-care, mobility, lifting and ambulation. Modalities:     [] GAME READY (VASO)- for significant edema, swelling, pain control. Charges:  Timed Code Treatment Minutes: 35'   Total Treatment Minutes: 48'      [] EVAL (LOW) 81772 (typically 20 minutes face-to-face)  [] EVAL (MOD) 56849 (typically 30 minutes face-to-face)  [] EVAL (HIGH) 27747 (typically 45 minutes face-to-face)  [] RE-EVAL     [x] IO(57006) x   1  [] IONTO  [] NMR (01815) x     [x] VASO  [x] Manual (24731) x    1 [] Other:  [] TA x      [] Mech Traction (36438)  [] ES(attended) (77800)      [] ES (un) (07399):         ASSESSMENT:  See eval      GOALS:   Patient stated goal:     [] Progressing: [] Met: [] Not Met: [] Adjusted    Therapist goals for Patient:   Short Term Goals: To be achieved in: 2 weeks  1. Independent in HEP and progression per patient tolerance, in order to prevent re-injury. [x] Progressing: [] Met: [] Not Met: [] Adjusted   2. Patient will have a decrease in pain to facilitate improvement in movement, function, and ADLs as indicated by Functional Deficits. [] Progressing: [] Met: [] Not Met: [] Adjusted    Long Term Goals: To be achieved in:   12 weeks  - The patient is expected to demonstrate less than 30% impairment, limitation or restriction in:  - walking and moving around (mobility)  - Patient will demonstrate increased passive and active ROM to 0-130 degrees to allow for proper joint functioning as indicated by patients Functional Deficits. - Patient will demonstrate an increase in Strength to 4/5 strength to allow for proper functional mobility as indicated by patients Functional Deficits. [] Progressing: [] Met: [] Not Met: [] Adjusted  - Patient will return to desired, higher level,  functional activities without increased symptoms or restriction. [] Progressing: [] Met: [] Not Met: [] Adjusted  -only mild qualitative deviations with unassisted gait on level surface.      [] Progressing: [] Met: [] Not Met: [] Adjusted    Overall Progression Towards Functional goals/ Treatment Progress Update:  [] Patient is progressing as expected towards functional goals listed. [] Progression is slowed due to complexities/Impairments listed. [] Progression has been slowed due to co-morbidities. [x] Plan just implemented, too soon to assess goals progression <30days   [] Goals require adjustment due to lack of progress  [] Patient is not progressing as expected and requires additional follow up with physician  [] Other    Prognosis for POC: [x] Good [] Fair  [] Poor      Patient requires continued skilled intervention: [x] Yes  [] No    Treatment/Activity Tolerance:  [x] Patient able to complete treatment  [] Patient limited by fatigue  [] Patient limited by pain    [] Patient limited by other medical complications  [] Other:         Return to Play: (if applicable)   []  Stage 1: Intro to Strength   []  Stage 2: Return to Run and Strength   []  Stage 3: Return to Jump and Strength   []  Stage 4: Dynamic Strength and Agility   []  Stage 5: Sport Specific Training     []  Ready to Return to Play, Meets All Above Stages   []  Not Ready for Return to Sports   Comments:                               PLAN: See eval  [x] Continue per plan of care [] Alter current plan (see comments above)  [] Plan of care initiated [] Hold pending MD visit [] Discharge      Electronically signed by:  Alma Mcwilliams PT, MPT     Note: If patient does not return for scheduled/ recommended follow up visits, this note will serve as a discharge from care along with most recent update on progress.

## 2023-03-01 ENCOUNTER — APPOINTMENT (OUTPATIENT)
Dept: PHYSICAL THERAPY | Age: 38
End: 2023-03-01
Payer: COMMERCIAL

## 2023-03-03 ENCOUNTER — HOSPITAL ENCOUNTER (OUTPATIENT)
Dept: PHYSICAL THERAPY | Age: 38
Setting detail: THERAPIES SERIES
Discharge: HOME | End: 2023-03-03
Payer: COMMERCIAL

## 2023-03-03 PROCEDURE — 97110 THERAPEUTIC EXERCISES: CPT | Performed by: PHYSICAL THERAPIST

## 2023-03-03 PROCEDURE — 97140 MANUAL THERAPY 1/> REGIONS: CPT | Performed by: PHYSICAL THERAPIST

## 2023-03-06 NOTE — FLOWSHEET NOTE
The [facility] and [address],  Sports Performance and Rehabilitation, [facility] 6199 [address]  793 [facility],5Th Floor   Cm Sanabria  Phone: [phone]  Fax: [phone]     Physical Therapy Treatment Note/ Progress Report:           Date:  3/3/2023    Patient Name:  Marbella Pruitt    :  1985  MRN: [id number]  Restrictions/Precautions:    Medical/Treatment Diagnosis Information:    M17.11 (ICD-10-CM) - Primary osteoarthritis of right knee       M25.561 - right knee pain                                                Insurance/Certification information:     Physician Information:     Has the plan of care been signed (Y/N):        []  Yes  [x]  No     Date of Patient follow up with Physician:       Is this a Progress Report:     []  Yes  [x]  No        If Yes:  Date Range for reporting period:  Beginning  Ending    Progress report will be due (10 Rx or 30 days whichever is less):        Recertification will be due (POC Duration  / 90 days whichever is less):          Visit # Insurance Allowable Auth Required   4  []  Yes []  No        Functional Scale:     Date assessed:        Latex Allergy:  [x]NO      []YES  Preferred Language for Healthcare:   [x]English       []other:      Pain level:  /10     SUBJECTIVE:  \"coming along\"    OBJECTIVE:  107 degrees flexion   Observation:   Test measurements:      RESTRICTIONS/PRECAUTIONS:     Exercises/Interventions:       Therapeutic Ex (85694) Sets/sec Reps Notes/CUES   Seated hamstring stretch   4 x 20\"    Quad sets   20 x 10\"    Saq/laq  30x each     Slr's (flex, abd)  25x    Eob knee flexion   20\" x 7    ERMI  10'    Reformer   10'    Press   50# - 25x    Slt brd   3x 20\"    Step up   2\" - 15x                Manual Intervention ([id number])      Prom/stm   14                                                                                                                                            Therapeutic Exercise and NMR EXR  [x] (80543) Provided verbal/tactile cueing for activities related to strengthening, flexibility, endurance, ROM for improvements in LE, proximal hip, and core control with self care, mobility, lifting, ambulation. [x] (06838) Provided verbal/tactile cueing for activities related to improving balance, coordination, kinesthetic sense, posture, motor skill, proprioception to assist with LE, proximal hip, and core control in self-care, mobility, lifting, ambulation and eccentric single leg control.      NMR and Therapeutic Activities:    [x] (14641 or 46726) Provided verbal/tactile cueing for activities related to improving balance, coordination, kinesthetic sense, posture, motor skill, proprioception and motor activation to allow for proper function of core, proximal hip and LE with self-care and ADLs and functional mobility.   [] (98073) Gait Re-education- Provided training and instruction to the patient for proper LE, core and proximal hip recruitment and positioning and eccentric body weight control with ambulation re-education including up and down stairs     Home Exercise Program:    [x] (29056) Reviewed/Progressed HEP activities related to strengthening, flexibility, endurance, ROM of core, proximal hip and LE for functional self-care, mobility, lifting and ambulation/stair navigation   [] (79284) Reviewed/Progressed HEP activities related to improving balance, coordination, kinesthetic sense, posture, motor skill, proprioception of core, proximal hip and LE for self-care, mobility, lifting, and ambulation/stair navigation      Manual Treatments:  PROM / STM / Oscillations-Mobs:  G-I, II, III, IV (PA's, Inf., Post.)  [x] (02879) Provided manual therapy to mobilize LE, proximal hip and/or LS spine soft tissue/joints for the purpose of modulating pain, promoting relaxation, increasing ROM, reducing/eliminating soft tissue swelling/inflammation/restriction, improving soft tissue extensibility and allowing for proper ROM for normal function with self-care, mobility, lifting and ambulation. Modalities:     [] GAME READY (VASO)- for significant edema, swelling, pain control. Charges:  Timed Code Treatment Minutes: 45'   Total Treatment Minutes: 39'      [] EVAL (LOW) 46475 (typically 20 minutes face-to-face)  [] EVAL (MOD) 21633 (typically 30 minutes face-to-face)  [] EVAL (HIGH) 46467 (typically 45 minutes face-to-face)  [] RE-EVAL     [x] CM(12955) x   2  [] IONTO  [] NMR (60018) x     [] VASO  [x] Manual (94658) x    1 [] Other:  [] TA x      [] Mech Traction (62721)  [] ES(attended) (05616)      [] ES (un) (15361):         ASSESSMENT: tolerated. .. GOALS:   Patient stated goal:     [x] Progressing: [] Met: [] Not Met: [] Adjusted    Therapist goals for Patient:   Short Term Goals: To be achieved in: 2 weeks  1. Independent in HEP and progression per patient tolerance, in order to prevent re-injury. [x] Progressing: [] Met: [] Not Met: [] Adjusted   2. Patient will have a decrease in pain to facilitate improvement in movement, function, and ADLs as indicated by Functional Deficits. [x] Progressing: [] Met: [] Not Met: [] Adjusted    Long Term Goals: To be achieved in:   12 weeks  - The patient is expected to demonstrate less than 30% impairment, limitation or restriction in:  - walking and moving around (mobility)  - Patient will demonstrate increased passive and active ROM to 0-130 degrees to allow for proper joint functioning as indicated by patients Functional Deficits. - Patient will demonstrate an increase in Strength to 4/5 strength to allow for proper functional mobility as indicated by patients Functional Deficits. [x] Progressing: [] Met: [] Not Met: [] Adjusted  - Patient will return to desired, higher level,  functional activities without increased symptoms or restriction.            [x] Progressing: [] Met: [] Not Met: [] Adjusted  -only mild qualitative deviations with unassisted gait on level surface. [x] Progressing: [] Met: [] Not Met: [] Adjusted    Overall Progression Towards Functional goals/ Treatment Progress Update:  [] Patient is progressing as expected towards functional goals listed. [] Progression is slowed due to complexities/Impairments listed. [] Progression has been slowed due to co-morbidities. [x] Plan just implemented, too soon to assess goals progression <30days   [] Goals require adjustment due to lack of progress  [] Patient is not progressing as expected and requires additional follow up with physician  [] Other    Prognosis for POC: [x] Good [] Fair  [] Poor      Patient requires continued skilled intervention: [x] Yes  [] No    Treatment/Activity Tolerance:  [x] Patient able to complete treatment  [] Patient limited by fatigue  [] Patient limited by pain    [] Patient limited by other medical complications  [] Other:         Return to Play: (if applicable)   []  Stage 1: Intro to Strength   []  Stage 2: Return to Run and Strength   []  Stage 3: Return to Jump and Strength   []  Stage 4: Dynamic Strength and Agility   []  Stage 5: Sport Specific Training     []  Ready to Return to Play, Meets All Above Stages   []  Not Ready for Return to Sports   Comments:                               PLAN: See eval  [x] Continue per plan of care [] Alter current plan (see comments above)  [] Plan of care initiated [] Hold pending MD visit [] Discharge      Electronically signed by:  Haylee Paredes PT, MPT     Note: If patient does not return for scheduled/ recommended follow up visits, this note will serve as a discharge from care along with most recent update on progress.

## 2023-03-08 ENCOUNTER — TELEPHONE (OUTPATIENT)
Dept: ORTHOPEDIC SURGERY | Age: 38
End: 2023-03-08

## 2023-03-08 ENCOUNTER — HOSPITAL ENCOUNTER (OUTPATIENT)
Dept: PHYSICAL THERAPY | Age: 38
Setting detail: THERAPIES SERIES
Discharge: HOME | End: 2023-03-08
Payer: COMMERCIAL

## 2023-03-08 PROCEDURE — 97110 THERAPEUTIC EXERCISES: CPT | Performed by: PHYSICAL THERAPIST

## 2023-03-08 PROCEDURE — 97140 MANUAL THERAPY 1/> REGIONS: CPT | Performed by: PHYSICAL THERAPIST

## 2023-03-08 NOTE — TELEPHONE ENCOUNTER
Left a vm for the patient to confirm return to work date of 2/27/23 light duty with full duty 4/3/23est.

## 2023-03-10 ENCOUNTER — HOSPITAL ENCOUNTER (OUTPATIENT)
Dept: PHYSICAL THERAPY | Age: 38
Setting detail: THERAPIES SERIES
Discharge: HOME | End: 2023-03-10
Payer: COMMERCIAL

## 2023-03-10 PROCEDURE — 97110 THERAPEUTIC EXERCISES: CPT | Performed by: PHYSICAL THERAPIST

## 2023-03-10 PROCEDURE — 97140 MANUAL THERAPY 1/> REGIONS: CPT | Performed by: PHYSICAL THERAPIST

## 2023-03-11 NOTE — FLOWSHEET NOTE
The [facility] and [address],  Sports Performance and Rehabilitation, [facility] [address]199 [address] [place] [address]  793 [facility],5[address] Floor   Cm San[place]ria  Phone: [phone]  Fax: [phone]     Physical Therapy Treatment Note/ Progress Report:           Date:  3/8/2023    Patient Name:  Franklyn Gillis    :  1985  MRN: [id number]  Restrictions/Precautions:    Medical/Treatment Diagnosis Information:    M17.11 (ICD-10-CM) - Primary osteoarthritis of right knee       M25.561 - right knee pain                                                Insurance/Certification information:     Physician Information:     Has the plan of care been signed (Y/N):        []  Yes  [x]  No     Date of Patient follow up with Physician:       Is this a Progress Report:     []  Yes  [x]  No        If Yes:  Date Range for reporting period:  Beginning  Ending    Progress report will be due (10 Rx or 30 days whichever is less):        Recertification will be due (POC Duration  / 90 days whichever is less):          Visit # Insurance Allowable Auth Required   5  []  Yes []  No        Functional Scale:     Date assessed:        Latex Allergy:  [x]NO      []YES  Preferred Language for Healthcare:   [x]English       []other:      Pain level:  /10     SUBJECTIVE:  \"a little more sore today\"    OBJECTIVE:  110 degrees flexion   Observation:   Test measurements:      RESTRICTIONS/PRECAUTIONS:     Exercises/Interventions:       Therapeutic Ex (90229) Sets/sec Reps Notes/CUES   Seated hamstring stretch   4 x 20\"    Quad sets   20 x 10\"    Saq/laq  30x each     Slr's (flex, abd)  25x    Eob knee flexion   20\" x 7    ERMI  10'    Reformer   10'    Press   50# - 25x    Slt brd   3x 20\"    Step up   2\" - 15x                Manual Intervention ([id number])      Prom/stm   14                                                                                                                                            Therapeutic Exercise and NMR EXR  [x] (10103) Provided verbal/tactile cueing for activities related to strengthening, flexibility, endurance, ROM for improvements in LE, proximal hip, and core control with self care, mobility, lifting, ambulation. [x] (98234) Provided verbal/tactile cueing for activities related to improving balance, coordination, kinesthetic sense, posture, motor skill, proprioception to assist with LE, proximal hip, and core control in self-care, mobility, lifting, ambulation and eccentric single leg control.      NMR and Therapeutic Activities:    [x] (33952 or 23914) Provided verbal/tactile cueing for activities related to improving balance, coordination, kinesthetic sense, posture, motor skill, proprioception and motor activation to allow for proper function of core, proximal hip and LE with self-care and ADLs and functional mobility.   [] (45170) Gait Re-education- Provided training and instruction to the patient for proper LE, core and proximal hip recruitment and positioning and eccentric body weight control with ambulation re-education including up and down stairs     Home Exercise Program:    [x] (77725) Reviewed/Progressed HEP activities related to strengthening, flexibility, endurance, ROM of core, proximal hip and LE for functional self-care, mobility, lifting and ambulation/stair navigation   [] (31788) Reviewed/Progressed HEP activities related to improving balance, coordination, kinesthetic sense, posture, motor skill, proprioception of core, proximal hip and LE for self-care, mobility, lifting, and ambulation/stair navigation      Manual Treatments:  PROM / STM / Oscillations-Mobs:  G-I, II, III, IV (PA's, Inf., Post.)  [x] (63697) Provided manual therapy to mobilize LE, proximal hip and/or LS spine soft tissue/joints for the purpose of modulating pain, promoting relaxation, increasing ROM, reducing/eliminating soft tissue swelling/inflammation/restriction, improving soft tissue extensibility and allowing for proper ROM for normal function with self-care, mobility, lifting and ambulation. Modalities:     [] GAME READY (VASO)- for significant edema, swelling, pain control. Charges:  Timed Code Treatment Minutes: 45'   Total Treatment Minutes: 39'      [] EVAL (LOW) 93346 (typically 20 minutes face-to-face)  [] EVAL (MOD) 00065 (typically 30 minutes face-to-face)  [] EVAL (HIGH) 81659 (typically 45 minutes face-to-face)  [] RE-EVAL     [x] OX(93408) x   2  [] IONTO  [] NMR (59190) x     [] VASO  [x] Manual (25071) x    1 [] Other:  [] TA x      [] Mech Traction (42204)  [] ES(attended) (51140)      [] ES (un) (84506):         ASSESSMENT: tolerated. .. GOALS:   Patient stated goal:     [x] Progressing: [] Met: [] Not Met: [] Adjusted    Therapist goals for Patient:   Short Term Goals: To be achieved in: 2 weeks  1. Independent in HEP and progression per patient tolerance, in order to prevent re-injury. [x] Progressing: [] Met: [] Not Met: [] Adjusted   2. Patient will have a decrease in pain to facilitate improvement in movement, function, and ADLs as indicated by Functional Deficits. [x] Progressing: [] Met: [] Not Met: [] Adjusted    Long Term Goals: To be achieved in:   12 weeks  - The patient is expected to demonstrate less than 30% impairment, limitation or restriction in:  - walking and moving around (mobility)  - Patient will demonstrate increased passive and active ROM to 0-130 degrees to allow for proper joint functioning as indicated by patients Functional Deficits. - Patient will demonstrate an increase in Strength to 4/5 strength to allow for proper functional mobility as indicated by patients Functional Deficits. [x] Progressing: [] Met: [] Not Met: [] Adjusted  - Patient will return to desired, higher level,  functional activities without increased symptoms or restriction.            [x] Progressing: [] Met: [] Not Met: [] Adjusted  -only mild qualitative deviations with unassisted gait on level surface. [x] Progressing: [] Met: [] Not Met: [] Adjusted    Overall Progression Towards Functional goals/ Treatment Progress Update:  [] Patient is progressing as expected towards functional goals listed. [] Progression is slowed due to complexities/Impairments listed. [] Progression has been slowed due to co-morbidities. [x] Plan just implemented, too soon to assess goals progression <30days   [] Goals require adjustment due to lack of progress  [] Patient is not progressing as expected and requires additional follow up with physician  [] Other    Prognosis for POC: [x] Good [] Fair  [] Poor      Patient requires continued skilled intervention: [x] Yes  [] No    Treatment/Activity Tolerance:  [x] Patient able to complete treatment  [] Patient limited by fatigue  [] Patient limited by pain    [] Patient limited by other medical complications  [] Other:         Return to Play: (if applicable)   []  Stage 1: Intro to Strength   []  Stage 2: Return to Run and Strength   []  Stage 3: Return to Jump and Strength   []  Stage 4: Dynamic Strength and Agility   []  Stage 5: Sport Specific Training     []  Ready to Return to Play, Meets All Above Stages   []  Not Ready for Return to Sports   Comments:                               PLAN: See eval  [x] Continue per plan of care [] Alter current plan (see comments above)  [] Plan of care initiated [] Hold pending MD visit [] Discharge      Electronically signed by:  Kierra Bonilla PT, MPT     Note: If patient does not return for scheduled/ recommended follow up visits, this note will serve as a discharge from care along with most recent update on progress.

## 2023-03-12 NOTE — FLOWSHEET NOTE
The [facility] and [address],  Sports Performance and Rehabilitation, [facility] [address]199 [address] [place] [address]  793 [facility],5Th Floor   Cm Sanabria  Phone: [phone]  Fax: [phone]     Physical Therapy Treatment Note/ Progress Report:           Date:  3/10/2023    Patient Name:  Alberto Todd    :  1985  MRN: [id number]  Restrictions/Precautions:    Medical/Treatment Diagnosis Information:    M17.11 (ICD-10-CM) - Primary osteoarthritis of right knee       M25.561 - right knee pain                                                Insurance/Certification information:     Physician Information:     Has the plan of care been signed (Y/N):        []  Yes  [x]  No     Date of Patient follow up with Physician:       Is this a Progress Report:     []  Yes  [x]  No        If Yes:  Date Range for reporting period:  Beginning  Ending    Progress report will be due (10 Rx or 30 days whichever is less):        Recertification will be due (POC Duration  / 90 days whichever is less):          Visit # Insurance Allowable Auth Required   6  []  Yes []  No        Functional Scale:     Date assessed:        Latex Allergy:  [x]NO      []YES  Preferred Language for Healthcare:   [x]English       []other:      Pain level:  /10     SUBJECTIVE:  \"doing ok\"    OBJECTIVE:  112 degrees flexion   Observation:   Test measurements:      RESTRICTIONS/PRECAUTIONS:     Exercises/Interventions:       Therapeutic Ex (14710) Sets/sec Reps Notes/CUES   Seated hamstring stretch   4 x 20\"    Quad sets   20 x 10\"    Saq/laq  30x each mr     Slr's (flex, abd)  25x    Eob knee flexion      ERMI  10'    Reformer   10'    Press   50# - 25x    Slt brd   3x 20\"    Step up   2\" - 15x    Step overs   10x    Ext's/hamstrings   25x each    Manual Intervention ([id number])      Prom/stm   14 Therapeutic Exercise and NMR EXR  [x] (56511) Provided verbal/tactile cueing for activities related to strengthening, flexibility, endurance, ROM for improvements in LE, proximal hip, and core control with self care, mobility, lifting, ambulation. [x] (06489) Provided verbal/tactile cueing for activities related to improving balance, coordination, kinesthetic sense, posture, motor skill, proprioception to assist with LE, proximal hip, and core control in self-care, mobility, lifting, ambulation and eccentric single leg control.      NMR and Therapeutic Activities:    [x] (44245 or 90014) Provided verbal/tactile cueing for activities related to improving balance, coordination, kinesthetic sense, posture, motor skill, proprioception and motor activation to allow for proper function of core, proximal hip and LE with self-care and ADLs and functional mobility.   [] (67860) Gait Re-education- Provided training and instruction to the patient for proper LE, core and proximal hip recruitment and positioning and eccentric body weight control with ambulation re-education including up and down stairs     Home Exercise Program:    [x] (88077) Reviewed/Progressed HEP activities related to strengthening, flexibility, endurance, ROM of core, proximal hip and LE for functional self-care, mobility, lifting and ambulation/stair navigation   [] (50147) Reviewed/Progressed HEP activities related to improving balance, coordination, kinesthetic sense, posture, motor skill, proprioception of core, proximal hip and LE for self-care, mobility, lifting, and ambulation/stair navigation      Manual Treatments:  PROM / STM / Oscillations-Mobs:  G-I, II, III, IV (PA's, Inf., Post.)  [x] (17210) Provided manual therapy to mobilize LE, proximal hip and/or LS spine soft tissue/joints for the purpose of modulating pain, promoting relaxation, increasing ROM, reducing/eliminating soft tissue swelling/inflammation/restriction, improving soft tissue extensibility and allowing for proper ROM for normal function with self-care, mobility, lifting and ambulation. Modalities:     [] GAME READY (VASO)- for significant edema, swelling, pain control. Charges:  Timed Code Treatment Minutes: 45'   Total Treatment Minutes: 39'      [] EVAL (LOW) 83930 (typically 20 minutes face-to-face)  [] EVAL (MOD) 19847 (typically 30 minutes face-to-face)  [] EVAL (HIGH) 47553 (typically 45 minutes face-to-face)  [] RE-EVAL     [x] IF(94811) x   2  [] IONTO  [] NMR (56480) x     [] VASO  [x] Manual (61994) x    1 [] Other:  [] TA x      [] Mech Traction (37665)  [] ES(attended) (29715)      [] ES (un) (42191):         ASSESSMENT: tolerated. .. GOALS:   Patient stated goal:     [x] Progressing: [] Met: [] Not Met: [] Adjusted    Therapist goals for Patient:   Short Term Goals: To be achieved in: 2 weeks  1. Independent in HEP and progression per patient tolerance, in order to prevent re-injury. [x] Progressing: [] Met: [] Not Met: [] Adjusted   2. Patient will have a decrease in pain to facilitate improvement in movement, function, and ADLs as indicated by Functional Deficits. [x] Progressing: [] Met: [] Not Met: [] Adjusted    Long Term Goals: To be achieved in:   12 weeks  - The patient is expected to demonstrate less than 30% impairment, limitation or restriction in:  - walking and moving around (mobility)  - Patient will demonstrate increased passive and active ROM to 0-130 degrees to allow for proper joint functioning as indicated by patients Functional Deficits. - Patient will demonstrate an increase in Strength to 4/5 strength to allow for proper functional mobility as indicated by patients Functional Deficits. [x] Progressing: [] Met: [] Not Met: [] Adjusted  - Patient will return to desired, higher level,  functional activities without increased symptoms or restriction.            [x] Progressing: [] Met: [] Not Met: [] Adjusted  -only mild qualitative deviations with unassisted gait on level surface. [x] Progressing: [] Met: [] Not Met: [] Adjusted    Overall Progression Towards Functional goals/ Treatment Progress Update:  [] Patient is progressing as expected towards functional goals listed. [] Progression is slowed due to complexities/Impairments listed. [] Progression has been slowed due to co-morbidities. [x] Plan just implemented, too soon to assess goals progression <30days   [] Goals require adjustment due to lack of progress  [] Patient is not progressing as expected and requires additional follow up with physician  [] Other    Prognosis for POC: [x] Good [] Fair  [] Poor      Patient requires continued skilled intervention: [x] Yes  [] No    Treatment/Activity Tolerance:  [x] Patient able to complete treatment  [] Patient limited by fatigue  [] Patient limited by pain    [] Patient limited by other medical complications  [] Other:         Return to Play: (if applicable)   []  Stage 1: Intro to Strength   []  Stage 2: Return to Run and Strength   []  Stage 3: Return to Jump and Strength   []  Stage 4: Dynamic Strength and Agility   []  Stage 5: Sport Specific Training     []  Ready to Return to Play, Meets All Above Stages   []  Not Ready for Return to Sports   Comments:                               PLAN: See eval  [x] Continue per plan of care [] Alter current plan (see comments above)  [] Plan of care initiated [] Hold pending MD visit [] Discharge      Electronically signed by:  Anjelica Osorio PT, MPT     Note: If patient does not return for scheduled/ recommended follow up visits, this note will serve as a discharge from care along with most recent update on progress.

## 2023-03-15 ENCOUNTER — HOSPITAL ENCOUNTER (OUTPATIENT)
Dept: PHYSICAL THERAPY | Age: 38
Setting detail: THERAPIES SERIES
Discharge: HOME | End: 2023-03-15
Payer: COMMERCIAL

## 2023-03-15 PROCEDURE — 97140 MANUAL THERAPY 1/> REGIONS: CPT | Performed by: PHYSICAL THERAPIST

## 2023-03-15 PROCEDURE — 97110 THERAPEUTIC EXERCISES: CPT | Performed by: PHYSICAL THERAPIST

## 2023-03-17 ENCOUNTER — HOSPITAL ENCOUNTER (OUTPATIENT)
Dept: PHYSICAL THERAPY | Age: 38
Setting detail: THERAPIES SERIES
Discharge: HOME | End: 2023-03-17
Payer: COMMERCIAL

## 2023-03-17 PROCEDURE — 97110 THERAPEUTIC EXERCISES: CPT | Performed by: PHYSICAL THERAPIST

## 2023-03-17 PROCEDURE — 97140 MANUAL THERAPY 1/> REGIONS: CPT | Performed by: PHYSICAL THERAPIST

## 2023-03-18 NOTE — FLOWSHEET NOTE
The [facility] and [address],  Sports Performance and Rehabilitation, [facility] [address] [place] [address]  793 [facility],5Th Floor   Cm Sanabria  Phone: [phone]  Fax: [phone]     Physical Therapy Treatment Note/ Progress Report:           Date:  3/15/2023    Patient Name:  Caleb Alegre    :  1985  MRN: [id number]  Restrictions/Precautions:    Medical/Treatment Diagnosis Information:    M17.11 (ICD-10-CM) - Primary osteoarthritis of right knee       M25.561 - right knee pain                                                Insurance/Certification information:     Physician Information:     Has the plan of care been signed (Y/N):        []  Yes  [x]  No     Date of Patient follow up with Physician:       Is this a Progress Report:     []  Yes  [x]  No        If Yes:  Date Range for reporting period:  Beginning  Ending    Progress report will be due (10 Rx or 30 days whichever is less):        Recertification will be due (POC Duration  / 90 days whichever is less):          Visit # Insurance Allowable Auth Required   6  []  Yes []  No        Functional Scale:     Date assessed:        Latex Allergy:  [x]NO      []YES  Preferred Language for Healthcare:   [x]English       []other:      Pain level:  /10     SUBJECTIVE:  \"feeling ok. .. happy to be back at work\"    OBJECTIVE:  112 degrees flexion   Observation:   Test measurements:      RESTRICTIONS/PRECAUTIONS:     Exercises/Interventions:       Therapeutic Ex (90950) Sets/sec Reps Notes/CUES   Seated hamstring stretch   4 x 20\"    Quad sets   20 x 10\"    Saq/laq  30x each mr     Slr's (flex, abd)  25x    Eob knee flexion      ERMI  10'    Reformer   10'    Press   50# - 25x    Slt brd   3x 20\"    Step up   2\" - 15x    Step overs   10x    Ext's/hamstrings   25x each    Manual Intervention ([id number])      Prom/stm   14 Therapeutic Exercise and NMR EXR  [x] (69705) Provided verbal/tactile cueing for activities related to strengthening, flexibility, endurance, ROM for improvements in LE, proximal hip, and core control with self care, mobility, lifting, ambulation. [x] (07679) Provided verbal/tactile cueing for activities related to improving balance, coordination, kinesthetic sense, posture, motor skill, proprioception to assist with LE, proximal hip, and core control in self-care, mobility, lifting, ambulation and eccentric single leg control.      NMR and Therapeutic Activities:    [x] (03488 or 65578) Provided verbal/tactile cueing for activities related to improving balance, coordination, kinesthetic sense, posture, motor skill, proprioception and motor activation to allow for proper function of core, proximal hip and LE with self-care and ADLs and functional mobility.   [] (71122) Gait Re-education- Provided training and instruction to the patient for proper LE, core and proximal hip recruitment and positioning and eccentric body weight control with ambulation re-education including up and down stairs     Home Exercise Program:    [x] (53888) Reviewed/Progressed HEP activities related to strengthening, flexibility, endurance, ROM of core, proximal hip and LE for functional self-care, mobility, lifting and ambulation/stair navigation   [] (67559) Reviewed/Progressed HEP activities related to improving balance, coordination, kinesthetic sense, posture, motor skill, proprioception of core, proximal hip and LE for self-care, mobility, lifting, and ambulation/stair navigation      Manual Treatments:  PROM / STM / Oscillations-Mobs:  G-I, II, III, IV (PA's, Inf., Post.)  [x] (52498) Provided manual therapy to mobilize LE, proximal hip and/or LS spine soft tissue/joints for the purpose of modulating pain, promoting relaxation, increasing ROM, reducing/eliminating soft tissue swelling/inflammation/restriction, improving soft tissue extensibility and allowing for proper ROM for normal function with self-care, mobility, lifting and ambulation. Modalities:     [] GAME READY (VASO)- for significant edema, swelling, pain control. Charges:  Timed Code Treatment Minutes: 45'   Total Treatment Minutes: 39'      [] EVAL (LOW) 70272 (typically 20 minutes face-to-face)  [] EVAL (MOD) 05759 (typically 30 minutes face-to-face)  [] EVAL (HIGH) 02007 (typically 45 minutes face-to-face)  [] RE-EVAL     [x] DW(70599) x   2  [] IONTO  [] NMR (18890) x     [] VASO  [x] Manual (95110) x    1 [] Other:  [] TA x      [] Mech Traction (61587)  [] ES(attended) (08381)      [] ES (un) (46354):         ASSESSMENT: tolerated. .. GOALS:   Patient stated goal:     [x] Progressing: [] Met: [] Not Met: [] Adjusted    Therapist goals for Patient:   Short Term Goals: To be achieved in: 2 weeks  1. Independent in HEP and progression per patient tolerance, in order to prevent re-injury. [x] Progressing: [] Met: [] Not Met: [] Adjusted   2. Patient will have a decrease in pain to facilitate improvement in movement, function, and ADLs as indicated by Functional Deficits. [x] Progressing: [] Met: [] Not Met: [] Adjusted    Long Term Goals: To be achieved in:   12 weeks  - The patient is expected to demonstrate less than 30% impairment, limitation or restriction in:  - walking and moving around (mobility)  - Patient will demonstrate increased passive and active ROM to 0-130 degrees to allow for proper joint functioning as indicated by patients Functional Deficits. - Patient will demonstrate an increase in Strength to 4/5 strength to allow for proper functional mobility as indicated by patients Functional Deficits. [x] Progressing: [] Met: [] Not Met: [] Adjusted  - Patient will return to desired, higher level,  functional activities without increased symptoms or restriction.            [x] Progressing: [] Met: [] Not Met: [] Adjusted  -only mild qualitative deviations with unassisted gait on level surface. [x] Progressing: [] Met: [] Not Met: [] Adjusted    Overall Progression Towards Functional goals/ Treatment Progress Update:  [] Patient is progressing as expected towards functional goals listed. [] Progression is slowed due to complexities/Impairments listed. [] Progression has been slowed due to co-morbidities. [x] Plan just implemented, too soon to assess goals progression <30days   [] Goals require adjustment due to lack of progress  [] Patient is not progressing as expected and requires additional follow up with physician  [] Other    Prognosis for POC: [x] Good [] Fair  [] Poor      Patient requires continued skilled intervention: [x] Yes  [] No    Treatment/Activity Tolerance:  [x] Patient able to complete treatment  [] Patient limited by fatigue  [] Patient limited by pain    [] Patient limited by other medical complications  [] Other:         Return to Play: (if applicable)   []  Stage 1: Intro to Strength   []  Stage 2: Return to Run and Strength   []  Stage 3: Return to Jump and Strength   []  Stage 4: Dynamic Strength and Agility   []  Stage 5: Sport Specific Training     []  Ready to Return to Play, Meets All Above Stages   []  Not Ready for Return to Sports   Comments:                               PLAN: See eval  [x] Continue per plan of care [] Alter current plan (see comments above)  [] Plan of care initiated [] Hold pending MD visit [] Discharge      Electronically signed by:  Chyu Olson PT, MPT     Note: If patient does not return for scheduled/ recommended follow up visits, this note will serve as a discharge from care along with most recent update on progress.

## 2023-03-19 NOTE — FLOWSHEET NOTE
The [facility] and [address],  Sports Performance and Rehabilitation, [facility] [address]  793 [facility],5Th Floor   Cm Sanabria  Phone: [phone]  Fax: [phone]     Physical Therapy Treatment Note/ Progress Report:           Date:  3/17/2023    Patient Name:  Jean-Paul Sanchez    :  1985  MRN: [id number]  Restrictions/Precautions:    Medical/Treatment Diagnosis Information:    M17.11 (ICD-10-CM) - Primary osteoarthritis of right knee       M25.561 - right knee pain                                                Insurance/Certification information:     Physician Information:     Has the plan of care been signed (Y/N):        []  Yes  [x]  No     Date of Patient follow up with Physician:       Is this a Progress Report:     []  Yes  [x]  No        If Yes:  Date Range for reporting period:  Beginning  Ending    Progress report will be due (10 Rx or 30 days whichever is less):        Recertification will be due (POC Duration  / 90 days whichever is less):          Visit # Insurance Allowable Auth Required   7  []  Yes []  No        Functional Scale:     Date assessed:        Latex Allergy:  [x]NO      []YES  Preferred Language for Healthcare:   [x]English       []other:      Pain level:  /10     SUBJECTIVE:  \"I am feeling ok. Abarca Hidden A[place]ca Hidden \"    OBJECTIVE:    Observation: 0-114  Test measurements:      RESTRICTIONS/PRECAUTIONS:     Exercises/Interventions:       Therapeutic Ex (98152) Sets/sec Reps Notes/CUES   Seated hamstring stretch   4 x 20\"    Quad sets   20 x 10\"    Saq/laq  30x each mr     Slr's (flex, abd)  25x    Eob knee flexion      ERMI  10'    Reformer   10'    Press   50# - 25x    Slt brd   3x 20\"    Step up   2\" - 15x    Step overs   10x    Ext's/hamstrings     sls      Heel prop      25x each    3 x 20\"        8'     Manual Intervention (29375)      Prom/stm   14 Therapeutic Exercise and NMR EXR  [x] (82468) Provided verbal/tactile cueing for activities related to strengthening, flexibility, endurance, ROM for improvements in LE, proximal hip, and core control with self care, mobility, lifting, ambulation. [x] (98804) Provided verbal/tactile cueing for activities related to improving balance, coordination, kinesthetic sense, posture, motor skill, proprioception to assist with LE, proximal hip, and core control in self-care, mobility, lifting, ambulation and eccentric single leg control.      NMR and Therapeutic Activities:    [x] (69465 or 06646) Provided verbal/tactile cueing for activities related to improving balance, coordination, kinesthetic sense, posture, motor skill, proprioception and motor activation to allow for proper function of core, proximal hip and LE with self-care and ADLs and functional mobility.   [] (71368) Gait Re-education- Provided training and instruction to the patient for proper LE, core and proximal hip recruitment and positioning and eccentric body weight control with ambulation re-education including up and down stairs     Home Exercise Program:    [x] (48623) Reviewed/Progressed HEP activities related to strengthening, flexibility, endurance, ROM of core, proximal hip and LE for functional self-care, mobility, lifting and ambulation/stair navigation   [] (07643) Reviewed/Progressed HEP activities related to improving balance, coordination, kinesthetic sense, posture, motor skill, proprioception of core, proximal hip and LE for self-care, mobility, lifting, and ambulation/stair navigation      Manual Treatments:  PROM / STM / Oscillations-Mobs:  G-I, II, III, IV (PA's, Inf., Post.)  [x] (54638) Provided manual therapy to mobilize LE, proximal hip and/or LS spine soft tissue/joints for the purpose of modulating pain, promoting relaxation, increasing ROM, reducing/eliminating soft tissue swelling/inflammation/restriction, improving soft tissue extensibility and allowing for proper ROM for normal function with self-care, mobility, lifting and ambulation. Modalities:     [] GAME READY (VASO)- for significant edema, swelling, pain control. Charges:  Timed Code Treatment Minutes: 45'   Total Treatment Minutes: 39'      [] EVAL (LOW) 93183 (typically 20 minutes face-to-face)  [] EVAL (MOD) 76857 (typically 30 minutes face-to-face)  [] EVAL (HIGH) 96501 (typically 45 minutes face-to-face)  [] RE-EVAL     [x] QP(64272) x   2  [] IONTO  [] NMR (22008) x     [] VASO  [x] Manual (68750) x    1 [] Other:  [] TA x      [] Mech Traction (65236)  [] ES(attended) (69268)      [] ES (un) (15659):         ASSESSMENT: tolerated. .. GOALS:   Patient stated goal:     [x] Progressing: [] Met: [] Not Met: [] Adjusted    Therapist goals for Patient:   Short Term Goals: To be achieved in: 2 weeks  1. Independent in HEP and progression per patient tolerance, in order to prevent re-injury. [x] Progressing: [] Met: [] Not Met: [] Adjusted   2. Patient will have a decrease in pain to facilitate improvement in movement, function, and ADLs as indicated by Functional Deficits. [x] Progressing: [] Met: [] Not Met: [] Adjusted    Long Term Goals: To be achieved in:   12 weeks  - The patient is expected to demonstrate less than 30% impairment, limitation or restriction in:  - walking and moving around (mobility)  - Patient will demonstrate increased passive and active ROM to 0-130 degrees to allow for proper joint functioning as indicated by patients Functional Deficits. - Patient will demonstrate an increase in Strength to 4/5 strength to allow for proper functional mobility as indicated by patients Functional Deficits. [x] Progressing: [] Met: [] Not Met: [] Adjusted  - Patient will return to desired, higher level,  functional activities without increased symptoms or restriction.            [x] Progressing: [] Met: [] Not Met: [] Adjusted  -only mild qualitative deviations with unassisted gait on level surface. [x] Progressing: [] Met: [] Not Met: [] Adjusted    Overall Progression Towards Functional goals/ Treatment Progress Update:  [] Patient is progressing as expected towards functional goals listed. [] Progression is slowed due to complexities/Impairments listed. [] Progression has been slowed due to co-morbidities. [x] Plan just implemented, too soon to assess goals progression <30days   [] Goals require adjustment due to lack of progress  [] Patient is not progressing as expected and requires additional follow up with physician  [] Other    Prognosis for POC: [x] Good [] Fair  [] Poor      Patient requires continued skilled intervention: [x] Yes  [] No    Treatment/Activity Tolerance:  [x] Patient able to complete treatment  [] Patient limited by fatigue  [] Patient limited by pain    [] Patient limited by other medical complications  [] Other:         Return to Play: (if applicable)   []  Stage 1: Intro to Strength   []  Stage 2: Return to Run and Strength   []  Stage 3: Return to Jump and Strength   []  Stage 4: Dynamic Strength and Agility   []  Stage 5: Sport Specific Training     []  Ready to Return to Play, Meets All Above Stages   []  Not Ready for Return to Sports   Comments:                               PLAN: See eval  [x] Continue per plan of care [] Alter current plan (see comments above)  [] Plan of care initiated [] Hold pending MD visit [] Discharge      Electronically signed by:  Rick Milian PT, MPT     Note: If patient does not return for scheduled/ recommended follow up visits, this note will serve as a discharge from care along with most recent update on progress.

## 2023-03-22 ENCOUNTER — HOSPITAL ENCOUNTER (OUTPATIENT)
Dept: PHYSICAL THERAPY | Age: 38
Setting detail: THERAPIES SERIES
Discharge: HOME | End: 2023-03-22
Payer: COMMERCIAL

## 2023-03-22 ENCOUNTER — OFFICE VISIT (OUTPATIENT)
Dept: ORTHOPEDIC SURGERY | Age: 38
End: 2023-03-22

## 2023-03-22 DIAGNOSIS — Z96.651 S/P TOTAL KNEE ARTHROPLASTY, RIGHT: Primary | ICD-10-CM

## 2023-03-22 DIAGNOSIS — Z96.651 S/P TOTAL KNEE ARTHROPLASTY, RIGHT: ICD-10-CM

## 2023-03-22 PROCEDURE — 97140 MANUAL THERAPY 1/> REGIONS: CPT | Performed by: PHYSICAL THERAPIST

## 2023-03-22 PROCEDURE — 97110 THERAPEUTIC EXERCISES: CPT | Performed by: PHYSICAL THERAPIST

## 2023-03-22 RX ORDER — CELECOXIB 100 MG/1
CAPSULE ORAL
Qty: 60 CAPSULE | Refills: 0 | Status: SHIPPED | OUTPATIENT
Start: 2023-03-22

## 2023-03-22 NOTE — PROGRESS NOTES
History: Lesa presents for 6 week follow-up visit following right total knee arthroplasty. She has been doing well with PT at [facility] and her physical therapist reports she has met goals and rehabbed well. Off all medication except celebrex. Returning to work on [employer] duty. Exam: Range of motion is 0-125. Incision is healed. No ligamentous instability. She is neurovascular intact distally. Assessment: 56-year-old female 6 weeks status post right total knee arthroplasty recovering well so far. Plan: She has done very well with PT and met ROM And mobility goals. Return to work on light duty. OK to return to full duty in 1 month.  F/u PRN    Abby Canas MD

## 2023-03-22 NOTE — LETTER
March 22, 2023       Jose L Michael YOB: 1985   [address] Date of Visit:  3/22/2023       To Whom It May Concern: It is my medical opinion that Uche Capellan may return to work on 3/23/2023 as she was seen in the office today. she may return to full duty in 1 month 4/19/2023. .    If you have any questions or concerns, please don't hesitate to call.     Sincerely,        Andres Meraz MD

## 2023-03-23 PROCEDURE — 97110 THERAPEUTIC EXERCISES: CPT | Performed by: PHYSICAL THERAPIST

## 2023-03-24 ENCOUNTER — HOSPITAL ENCOUNTER (OUTPATIENT)
Dept: PHYSICAL THERAPY | Age: 38
Setting detail: THERAPIES SERIES
Discharge: HOME | End: 2023-03-24
Payer: COMMERCIAL

## 2023-03-24 PROCEDURE — 97110 THERAPEUTIC EXERCISES: CPT | Performed by: PHYSICAL THERAPIST

## 2023-03-25 NOTE — FLOWSHEET NOTE
Met: [] Not Met: [] Adjusted  -only mild qualitative deviations with unassisted gait on level surface. [x] Progressing: [] Met: [] Not Met: [] Adjusted    Overall Progression Towards Functional goals/ Treatment Progress Update:  [] Patient is progressing as expected towards functional goals listed. [] Progression is slowed due to complexities/Impairments listed. [] Progression has been slowed due to co-morbidities. [x] Plan just implemented, too soon to assess goals progression <30days   [] Goals require adjustment due to lack of progress  [] Patient is not progressing as expected and requires additional follow up with physician  [] Other    Prognosis for POC: [x] Good [] Fair  [] Poor      Patient requires continued skilled intervention: [x] Yes  [] No    Treatment/Activity Tolerance:  [x] Patient able to complete treatment  [] Patient limited by fatigue  [] Patient limited by pain    [] Patient limited by other medical complications  [] Other:         Return to Play: (if applicable)   []  Stage 1: Intro to Strength   []  Stage 2: Return to Run and Strength   []  Stage 3: Return to Jump and Strength   []  Stage 4: Dynamic Strength and Agility   []  Stage 5: Sport Specific Training     []  Ready to Return to Play, Meets All Above Stages   []  Not Ready for Return to Sports   Comments:                               PLAN: See eval  [x] Continue per plan of care [] Alter current plan (see comments above)  [] Plan of care initiated [] Hold pending MD visit [] Discharge      Electronically signed by:  Armen Jeffers PT, MPT     Note: If patient does not return for scheduled/ recommended follow up visits, this note will serve as a discharge from care along with most recent update on progress.

## 2023-03-26 NOTE — FLOWSHEET NOTE
The [facility] and [address],  Sports Performance and Rehabilitation, [facility] [address] [place] [address]  [address] [facility],5[address] Floor   Cm Sanabria  Phone: [phone]  Fax: [phone]     Physical Therapy Treatment Note/ Progress Report:           Date:  3/24/2023    Patient Name:  Timothy Stephens    :  1985  MRN: [id number]  Restrictions/Precautions:    Medical/Treatment Diagnosis Information:    M17.11 (ICD-10-CM) - Primary osteoarthritis of right knee       M25.561 - right knee pain                                                Insurance/Certification information:     Physician Information:     Has the plan of care been signed (Y/N):        []  Yes  [x]  No     Date of Patient follow up with Physician:       Is this a Progress Report:     []  Yes  [x]  No        If Yes:  Date Range for reporting period:  Beginning  Ending    Progress report will be due (10 Rx or 30 days whichever is less):        Recertification will be due (POC Duration  / 90 days whichever is less):          Visit # Insurance Allowable Auth Required   8  []  Yes []  No        Functional Scale:     Date assessed:        Latex Allergy:  [x]NO      []YES  Preferred Language for Healthcare:   [x]English       []other:      Pain level:  /10     SUBJECTIVE:  \"I am feeling alright\"    OBJECTIVE:    Observation: 0-118  Test measurements:      RESTRICTIONS/PRECAUTIONS:     Exercises/Interventions:       Therapeutic Ex (99016) Sets/sec Reps Notes/CUES   Seated hamstring stretch   4 x 20\"    Quad sets   20 x 10\"    Saq/laq  30x each mr     Slr's (flex, abd)  25x    Eob knee flexion      ERMI  10'    Reformer   10'    Press   50# - 25x    Slt brd   3x 20\"    Step up   2\" - 15x    Step overs   10x    Ext's/hamstrings     sls      Heel prop      Press        olga      25x each    3 x 20\"      8'     30x - B, single      30x ext, abd    Manual Intervention ([id number])      Prom/stm

## 2023-03-29 ENCOUNTER — HOSPITAL ENCOUNTER (OUTPATIENT)
Dept: PHYSICAL THERAPY | Age: 38
Setting detail: THERAPIES SERIES
Discharge: HOME | End: 2023-03-29
Payer: COMMERCIAL

## 2023-03-29 PROCEDURE — 97110 THERAPEUTIC EXERCISES: CPT | Performed by: PHYSICAL THERAPIST

## 2023-03-30 NOTE — FLOWSHEET NOTE
The [facility] and [address],  Sports Performance and Rehabilitation, [facility] [address]  793 [facility],5[address] Floor   Cm Sanabria  Phone: [phone]  Fax: [phone]     Physical Therapy Treatment Note/ Progress Report:           Date:  3/29/2023    Patient Name:  Ela Fang    :  1985  MRN: [id number]  Restrictions/Precautions:    Medical/Treatment Diagnosis Information:    M17.11 (ICD-10-CM) - Primary osteoarthritis of right knee       M25.561 - right knee pain                                                Insurance/Certification information:     Physician Information:     Has the plan of care been signed (Y/N):        []  Yes  [x]  No     Date of Patient follow up with Physician:       Is this a Progress Report:     []  Yes  [x]  No        If Yes:  Date Range for reporting period:  Beginning  Ending    Progress report will be due (10 Rx or 30 days whichever is less):        Recertification will be due (POC Duration  / 90 days whichever is less):          Visit # Insurance Allowable Auth Required   9  []  Yes []  No        Functional Scale:     Date assessed:        Latex Allergy:  [x]NO      []YES  Preferred Language for Healthcare:   [x]English       []other:      Pain level:  /10     SUBJECTIVE:  \"I am feeling alright\"    OBJECTIVE:    Observation: 0-118  Test measurements:      RESTRICTIONS/PRECAUTIONS:     Exercises/Interventions:       Therapeutic Ex (81045) Sets/sec Reps Notes/CUES   Seated hamstring stretch   4 x 20\"    Quad sets   20 x 10\"    Saq/laq  30x each mr     Slr's (flex, abd)  25x    Eob knee flexion      ERMI  10'    Reformer   10'    Press   50# - 25x    Slt brd   3x 20\"    Step up   2\" - 15x    Step overs   10x    Ext's/hamstrings     sls      Heel prop      Press        olga      25x each    3 x 20\"      8'     30x - B, single      30x ext, abd    Manual Intervention (.[id number])      Prom/stm

## 2023-03-31 ENCOUNTER — HOSPITAL ENCOUNTER (OUTPATIENT)
Dept: PHYSICAL THERAPY | Age: 38
Setting detail: THERAPIES SERIES
Discharge: HOME | End: 2023-03-31
Payer: COMMERCIAL

## 2023-03-31 PROCEDURE — 97110 THERAPEUTIC EXERCISES: CPT | Performed by: PHYSICAL THERAPIST

## 2023-03-31 PROCEDURE — 97140 MANUAL THERAPY 1/> REGIONS: CPT | Performed by: PHYSICAL THERAPIST

## 2023-04-01 NOTE — FLOWSHEET NOTE
Therapeutic Exercise and NMR EXR  [x] (54964) Provided verbal/tactile cueing for activities related to strengthening, flexibility, endurance, ROM for improvements in LE, proximal hip, and core control with self care, mobility, lifting, ambulation. [x] (77455) Provided verbal/tactile cueing for activities related to improving balance, coordination, kinesthetic sense, posture, motor skill, proprioception to assist with LE, proximal hip, and core control in self-care, mobility, lifting, ambulation and eccentric single leg control.      NMR and Therapeutic Activities:    [x] (49224 or 19590) Provided verbal/tactile cueing for activities related to improving balance, coordination, kinesthetic sense, posture, motor skill, proprioception and motor activation to allow for proper function of core, proximal hip and LE with self-care and ADLs and functional mobility.   [] (81907) Gait Re-education- Provided training and instruction to the patient for proper LE, core and proximal hip recruitment and positioning and eccentric body weight control with ambulation re-education including up and down stairs     Home Exercise Program:    [x] (32563) Reviewed/Progressed HEP activities related to strengthening, flexibility, endurance, ROM of core, proximal hip and LE for functional self-care, mobility, lifting and ambulation/stair navigation   [] (00496) Reviewed/Progressed HEP activities related to improving balance, coordination, kinesthetic sense, posture, motor skill, proprioception of core, proximal hip and LE for self-care, mobility, lifting, and ambulation/stair navigation      Manual Treatments:  PROM / STM / Oscillations-Mobs:  G-I, II, III, IV (PA's, Inf., Post.)  [x] (26776) Provided manual therapy to mobilize LE, proximal hip and/or LS spine soft tissue/joints for the purpose of modulating pain, promoting relaxation,

## 2023-04-04 ENCOUNTER — HOSPITAL ENCOUNTER (OUTPATIENT)
Dept: PHYSICAL THERAPY | Age: 38
Setting detail: THERAPIES SERIES
Discharge: HOME | End: 2023-04-04
Payer: COMMERCIAL

## 2023-04-04 PROCEDURE — 97110 THERAPEUTIC EXERCISES: CPT | Performed by: PHYSICAL THERAPIST

## 2023-04-04 PROCEDURE — 97140 MANUAL THERAPY 1/> REGIONS: CPT | Performed by: PHYSICAL THERAPIST

## 2023-04-04 PROCEDURE — 97016 VASOPNEUMATIC DEVICE THERAPY: CPT | Performed by: PHYSICAL THERAPIST

## 2023-04-05 NOTE — FLOWSHEET NOTE
activities without increased symptoms or restriction. [x] Progressing: [] Met: [] Not Met: [] Adjusted  -only mild qualitative deviations with unassisted gait on level surface. [x] Progressing: [] Met: [] Not Met: [] Adjusted    Overall Progression Towards Functional goals/ Treatment Progress Update:  [] Patient is progressing as expected towards functional goals listed. [] Progression is slowed due to complexities/Impairments listed. [] Progression has been slowed due to co-morbidities. [x] Plan just implemented, too soon to assess goals progression <30days   [] Goals require adjustment due to lack of progress  [] Patient is not progressing as expected and requires additional follow up with physician  [] Other    Prognosis for POC: [x] Good [] Fair  [] Poor      Patient requires continued skilled intervention: [x] Yes  [] No    Treatment/Activity Tolerance:  [x] Patient able to complete treatment  [] Patient limited by fatigue  [] Patient limited by pain    [] Patient limited by other medical complications  [] Other:         Return to Play: (if applicable)   []  Stage 1: Intro to Strength   []  Stage 2: Return to Run and Strength   []  Stage 3: Return to Jump and Strength   []  Stage 4: Dynamic Strength and Agility   []  Stage 5: Sport Specific Training     []  Ready to Return to Play, Meets All Above Stages   []  Not Ready for Return to Sports   Comments:                               PLAN: See eval  [x] Continue per plan of care [] Alter current plan (see comments above)  [] Plan of care initiated [] Hold pending MD visit [] Discharge      Electronically signed by:  Nayan Cid PT, MPT     Note: If patient does not return for scheduled/ recommended follow up visits, this note will serve as a discharge from care along with most recent update on progress.

## 2023-04-06 ENCOUNTER — HOSPITAL ENCOUNTER (OUTPATIENT)
Dept: PHYSICAL THERAPY | Age: 38
Setting detail: THERAPIES SERIES
Discharge: HOME | End: 2023-04-06
Payer: COMMERCIAL

## 2023-04-06 PROCEDURE — 97140 MANUAL THERAPY 1/> REGIONS: CPT | Performed by: PHYSICAL THERAPIST

## 2023-04-06 PROCEDURE — 97016 VASOPNEUMATIC DEVICE THERAPY: CPT | Performed by: PHYSICAL THERAPIST

## 2023-04-06 PROCEDURE — 97110 THERAPEUTIC EXERCISES: CPT | Performed by: PHYSICAL THERAPIST

## 2023-04-07 NOTE — FLOWSHEET NOTE
will return to desired, higher level,  functional activities without increased symptoms or restriction. [x] Progressing: [] Met: [] Not Met: [] Adjusted  -only mild qualitative deviations with unassisted gait on level surface. [x] Progressing: [] Met: [] Not Met: [] Adjusted    Overall Progression Towards Functional goals/ Treatment Progress Update:  [] Patient is progressing as expected towards functional goals listed. [] Progression is slowed due to complexities/Impairments listed. [] Progression has been slowed due to co-morbidities. [x] Plan just implemented, too soon to assess goals progression <30days   [] Goals require adjustment due to lack of progress  [] Patient is not progressing as expected and requires additional follow up with physician  [] Other    Prognosis for POC: [x] Good [] Fair  [] Poor      Patient requires continued skilled intervention: [x] Yes  [] No    Treatment/Activity Tolerance:  [x] Patient able to complete treatment  [] Patient limited by fatigue  [] Patient limited by pain    [] Patient limited by other medical complications  [] Other:         Return to Play: (if applicable)   []  Stage 1: Intro to Strength   []  Stage 2: Return to Run and Strength   []  Stage 3: Return to Jump and Strength   []  Stage 4: Dynamic Strength and Agility   []  Stage 5: Sport Specific Training     []  Ready to Return to Play, Meets All Above Stages   []  Not Ready for Return to Sports   Comments:                               PLAN: See eval  [x] Continue per plan of care [] Alter current plan (see comments above)  [] Plan of care initiated [] Hold pending MD visit [] Discharge      Electronically signed by:  Barry Araya PT, MPT     Note: If patient does not return for scheduled/ recommended follow up visits, this note will serve as a discharge from care along with most recent update on progress.

## 2023-04-12 ENCOUNTER — APPOINTMENT (OUTPATIENT)
Dept: PHYSICAL THERAPY | Age: 38
End: 2023-04-12
Payer: COMMERCIAL

## 2023-04-19 ENCOUNTER — HOSPITAL ENCOUNTER (OUTPATIENT)
Dept: PHYSICAL THERAPY | Age: 38
Setting detail: THERAPIES SERIES
Discharge: HOME | End: 2023-04-19
Payer: COMMERCIAL

## 2023-04-19 ENCOUNTER — OFFICE VISIT (OUTPATIENT)
Dept: PRIMARY CARE CLINIC | Age: 38
End: 2023-04-19

## 2023-04-19 VITALS
HEART RATE: 77 BPM | BODY MASS INDEX: 50.02 KG/M2 | TEMPERATURE: 98.1 F | SYSTOLIC BLOOD PRESSURE: 130 MMHG | WEIGHT: 293 LBS | OXYGEN SATURATION: 97 % | DIASTOLIC BLOOD PRESSURE: 88 MMHG | HEIGHT: 64 IN

## 2023-04-19 DIAGNOSIS — Z96.651 S/P TOTAL KNEE ARTHROPLASTY, RIGHT: ICD-10-CM

## 2023-04-19 DIAGNOSIS — R73.02 IMPAIRED GLUCOSE TOLERANCE: ICD-10-CM

## 2023-04-19 DIAGNOSIS — Z00.00 ROUTINE PHYSICAL EXAMINATION: Primary | ICD-10-CM

## 2023-04-19 DIAGNOSIS — I10 ESSENTIAL HYPERTENSION: ICD-10-CM

## 2023-04-19 PROCEDURE — 97140 MANUAL THERAPY 1/> REGIONS: CPT | Performed by: PHYSICAL THERAPIST

## 2023-04-19 PROCEDURE — 97110 THERAPEUTIC EXERCISES: CPT | Performed by: PHYSICAL THERAPIST

## 2023-04-19 RX ORDER — CHLORTHALIDONE 25 MG/1
25 TABLET ORAL DAILY
COMMUNITY

## 2023-04-19 RX ORDER — LOSARTAN POTASSIUM 50 MG/1
50 TABLET ORAL DAILY
COMMUNITY

## 2023-04-19 SDOH — ECONOMIC STABILITY: HOUSING INSECURITY
IN THE LAST 12 MONTHS, WAS THERE A TIME WHEN YOU DID NOT HAVE A STEADY PLACE TO SLEEP OR SLEPT IN A SHELTER (INCLUDING NOW)?: NO

## 2023-04-19 SDOH — ECONOMIC STABILITY: INCOME INSECURITY: HOW HARD IS IT FOR YOU TO PAY FOR THE VERY BASICS LIKE FOOD, HOUSING, MEDICAL CARE, AND HEATING?: NOT HARD AT ALL

## 2023-04-19 SDOH — ECONOMIC STABILITY: FOOD INSECURITY: WITHIN THE PAST 12 MONTHS, YOU WORRIED THAT YOUR FOOD WOULD RUN OUT BEFORE YOU GOT MONEY TO BUY MORE.: NEVER TRUE

## 2023-04-19 SDOH — ECONOMIC STABILITY: FOOD INSECURITY: WITHIN THE PAST 12 MONTHS, THE FOOD YOU BOUGHT JUST DIDN'T LAST AND YOU DIDN'T HAVE MONEY TO GET MORE.: NEVER TRUE

## 2023-04-19 ASSESSMENT — ENCOUNTER SYMPTOMS
DIARRHEA: 0
VOMITING: 0
NAUSEA: 0
ABDOMINAL PAIN: 0
SHORTNESS OF BREATH: 0

## 2023-04-19 NOTE — PROGRESS NOTES
MEDICATION: AUGMENTIN  Augmentin (generic: amoxicillin + clavulanate) is a penicillin-type antibiotic.    DIRECTIONS FOR USE:  Take Augmentin with food to avoid stomach upset.  Take the medicine at regular intervals. If the label says “every 8 hours”, this means 3 times per day. Doses don't have to be exactly eight hours apart, but you should take three doses a day, in the morning, afternoon and at bedtime. Take all of the medicine until it is gone, even if you are feeling better. This will assure that the infection is fully treated.    WHAT TO WATCH FOR:  POSSIBLE SIDE EFFECTS: Nausea, diarrhea, anxiety, dizziness: Contact your doctor if any of these symptoms persist or become severe. White spots in the mouth (thrush), vaginal itching or discharge: Contact your doctor.  ALLERGIC REACTION: Rash, itching, swelling, trouble breathing or swallowing: Contact your doctor or return to this facility promptly.  MEDICAL CONDITIONS: Before starting this medicine, be sure your doctor knows if you have any of the following conditions:  · Allergic reaction to cephalosporin or penicillin-type drugs in the past  · Current infection with mononucleosis  · Breastfeeding  DRUG INTERACTION: Before starting this medicine, be sure your doctor knows if you are taking any of the following drugs:  · Allopurinol, Probenecid, methotrexate, birth control pills (see below)  WARNING:  · In rare cases, this medicine may block the effect of birth control pills. Therefore, to be safe, use another form of contraception during the menstrual cycle(s) in which you are taking this medicine.  · Do not drive, ride a bicycle or operate dangerous equipment while taking this medicine until you know how it will affect you.  [NOTE: This information topic may not include all directions, precautions, medical conditions, drug/food interactions and warnings for this drug. Check with your doctor, nurse, or pharmacist for any questions that you may have.]  ©  1762-7847 [employer] \[facility] [place] [facility]\"", [address] [place] [address], [place] [address]. All rights reserved. This information is not intended as a substitute for professional medical care. Always follow your healthcare professional's instructions.    Discharge Instructions: Limiting Fluids  Your doctor has prescribed fluid restriction for you. This means you need to limit the amount of fluids that you drink. If your kidneys are healthy, they balance the amount of fluid that enters and leaves your body. If your body cannot maintain this fluid balance because of illness or injury, you may need to limit the amount you drink.  This sheet can help you take in the right amount of fluid each day.    Measuring fluids  · Drink the amount of fluid recommended by your doctor. Don’t drink more or less.  · Write down the liquid limits your doctor recommended. Amounts are usually given in a certain number of ml or cc (these two units are equivalent). Here are some measurements to help you:  ¨ 1 cup = 8 oz = 240 cc = 240 ml  ¨ 4 cups = 32 oz = 1,000 ml (or cc) = 1 liter  ¨ 6½ cups = 50 oz = 1,500 ml (or cc) = 1.5 liters  ¨ 8-1/3 cups = 67 oz = 2,000 ml (or  cc) = 2 liters  ¨ 1 can of soda = 1½ cups = 12 oz = 360 ml (or cc)   ¨ 2 cups = 500 ml (or cc)   · Measure the amount of water you will drink during the day and put it in a container. Drink water from that container only.    Limiting fluids  · Drink only when you are thirsty.  · Try the following tips to help you feel less thirsty:  ¨ Rinse your mouth with water; don’t swallow.  ¨ Rinse your mouth with cool mouthwash.  ¨ Chew sugar-free gum or hard candy.  ¨ Suck on a few ice chips.  ¨ Try sucking on a lemon wedge to moisten your mouth.  ¨ Freeze grapes, berries, or small bits of fruit and let them thaw slowly in your mouth.  · Drink from a cup or a small glass.  · Divide your fluids up during the day; divide them between meals and snacks.  · Take your medicine with your liquids at meal  is no abdominal tenderness. Musculoskeletal:      Cervical back: Neck supple. Right lower leg: No edema. Left lower leg: No edema. Lymphadenopathy:      Cervical: No cervical adenopathy. Skin:     General: Skin is warm and dry. Capillary Refill: Capillary refill takes less than 2 seconds. Neurological:      General: No focal deficit present. Mental Status: She is alert. Mental status is at baseline. Psychiatric:         Mood and Affect: Mood normal.       Assessment:  Encounter Diagnoses   Name Primary? Routine physical examination Yes    Essential hypertension     S/P total knee arthroplasty, right     Impaired glucose tolerance        Plan:    - Fasting labs. - GYN visit + mammo to be scheduled. New Prescriptions    No medications on file        Orders Placed This Encounter   Procedures    CBC with Auto Differential    Comprehensive Metabolic Panel    Lipid, Fasting    Hemoglobin A1C        Return in about 6 months (around 10/19/2023) for Follow up HTN.          [address], DO time.  · Eat only a limited amount of the following “liquid foods\": soups, frozen juice bars, gravy, ices, sauces, milk shakes, pudding, sherbet, custard, ice cream, ice cubes, or any other food that becomes liquid when it stays at room temperature.  · Avoid salty foods. Remember, some foods may not taste salty but may still contain a high amount of salt (also called sodium). Read food labels to find the amount of sodium. A food is low sodium if it contains 140 mg or less of sodium per serving.  · If you have diabetes, control your blood sugar level to help control your thirst.    Follow-up  Make a follow-up appointment as directed by our staff.     When to call your doctor  Call your doctor right away if you have any of the following:  · Dizziness and lightheadedness  · Fainting  · Chest pain  · Shortness of breath  · Weight gain of more than 2 pounds in 24 hours or more than 5 pounds in 1 week  · Swelling in the hands, feet, or ankles  · Confusion      © 6991-7872 [employer]. [address] [place] [address]. All rights reserved. This information is not intended as a substitute for professional medical care. Always follow your healthcare professional's instructions.    HEART FAILURE  You have heart failure.  This means that your heart muscle is weakened and cannot pump blood the way it should.    MEDICATIONS:  · See Medication List (keep list up to date and bring it to your doctor appointments).  · Talk to your doctor if you have problems getting or taking medications.    ACTIVITY:  · Continue activity as you were doing in the hospital.  You can slowly increase to what you were doing before you were hospitalized.  · Balance activity with rest.  · Elevate legs when resting  · Daily Weight:  Weigh yourself first thing every morning (at the same time with same amount of clothes on).  · Keep a record of your weights, and bring it to your doctor appointments.    SMOKING:  · Avoid all tobacco  products and second hand smoke.  · Smoking Cessation Counseling offered.  · [place] Toll Free Quit Line: 1-699.740.5679    LOW SALT (SODIUM) DIET:  · Limit salt to help prevent fluid build-up in your body.  This will help prevent shortness of breath and swelling of feet and ankles.  · Avoid adding salt to food at the table and use products labeled \"low sodium\" or \"no salt\" (<300 mg per serving).  · Avoid leida salt foods like canned soup, sauces, bouillon, lunch meat, cheese, fast food, salty snacks, canned and packaged foods.    HEART FAILURE ACTION PLAN:  · Use this plan to help you decide when to change your routine or call the doctor.    CALL 911 IF YOU:  · Have pain or tightness in my chest..  · Have trouble breathing  · Have dizzy spells or feel faint.  · Feel anxious or like something bad will happen.    CONTACT YOUR DOCTOR (even on weekends and holidays) IF YOU:  · Have to sleep sitting up.  · Start coughing at night.  · Notice swelling in your ankles or any part of your body.  · Lose your appetite.  · Gain more than 3 pounds in 1-2 days or 5 pounds in a week.  · Lose more than 5 pounds in 2 days.  · Become tired faster or feel yourself losing energy.  · Have noisy breathing.    MEDICATIONS:  · See Medication List (bring to your doctor appointments).    VACCINES:    There is no immunization history on file for this patient.    ACTIVITY:  · Weight yourself daily (first thing in the morning, with same amount of clothes on) unless told otherwise by your doctor.  · Continue activity as your were in the hospital, slowly increase to what you were doing previously.  · Up as desired / no restrictions.    SMOKING:  · Avoid all tobacco products and second hand smoke.  · Smoking Cessation Counseling offered.  · [place] Toll Free Quit Line: 1-154.371.5247    DIET:  · Limit salt and salty foods unless told otherwise by your doctor.  · No Restrictions    · Trouble breathing or chest pain - CALL 911    CONTACT YOUR  DOCTOR IF:  · You have symptoms that are not \"normal\" for you.  · You have new or worse symptoms or pain, not relieved by medicine or rest.  · Temperature greater than 101 degrees F, chills or flu like symptoms.  · You gain more than 3 pounds in 2 days.  · Increased swelling, redness or drainage at IV site.    MEDICATIONS:  · See Medication List (bring to your doctor appointments).    VACCINES:    There is no immunization history on file for this patient.    ACTIVITY:  · Weight yourself daily (first thing in the morning, with same amount of clothes on) unless told otherwise by your doctor.  · Continue activity as your were in the hospital, slowly increase to what you were doing previously.  · Up as desired / no restrictions.    SMOKING:  · Avoid all tobacco products and second hand smoke.  · Smoking Cessation Counseling offered.  · [place] Toll Free Quit Line: 1-812.218.3210    DIET:  · Limit salt and salty foods unless told otherwise by your doctor.  · Special Diet: Low potassium and sodium foods/maintain fluid restrictions    · Trouble breathing or chest pain - CALL 911    CONTACT YOUR DOCTOR IF:  · You have symptoms that are not \"normal\" for you.  · You have new or worse symptoms or pain, not relieved by medicine or rest.  · Temperature greater than 101 degrees F, chills or flu like symptoms.  · You gain more than 3 pounds in 2 days.  · Increased swelling, redness or drainage.

## 2023-04-21 ENCOUNTER — HOSPITAL ENCOUNTER (OUTPATIENT)
Dept: PHYSICAL THERAPY | Age: 38
Setting detail: THERAPIES SERIES
Discharge: HOME | End: 2023-04-21
Payer: COMMERCIAL

## 2023-04-24 ENCOUNTER — HOSPITAL ENCOUNTER (OUTPATIENT)
Dept: PHYSICAL THERAPY | Age: 38
Setting detail: THERAPIES SERIES
Discharge: HOME | End: 2023-04-24
Payer: COMMERCIAL

## 2023-04-24 PROCEDURE — 97110 THERAPEUTIC EXERCISES: CPT | Performed by: PHYSICAL THERAPIST

## 2023-04-24 PROCEDURE — 97016 VASOPNEUMATIC DEVICE THERAPY: CPT | Performed by: PHYSICAL THERAPIST

## 2023-04-24 PROCEDURE — 97140 MANUAL THERAPY 1/> REGIONS: CPT | Performed by: PHYSICAL THERAPIST

## 2023-04-27 NOTE — FLOWSHEET NOTE
increasing ROM, reducing/eliminating soft tissue swelling/inflammation/restriction, improving soft tissue extensibility and allowing for proper ROM for normal function with self-care, mobility, lifting and ambulation. Modalities:     [] GAME READY (VASO)- for significant edema, swelling, pain control. Charges:  Timed Code Treatment Minutes: 45'   Total Treatment Minutes: 61'       [] EVAL (LOW) 13427 (typically 20 minutes face-to-face)  [] EVAL (MOD) 21209 (typically 30 minutes face-to-face)  [] EVAL (HIGH) 87670 (typically 45 minutes face-to-face)  [] RE-EVAL     [x] VE(28590) x  2  [] IONTO  [] NMR (63723) x     [x] Other: vaso  [] TA x      [] Mech Traction (20223)  [] ES(attended) (60672)      [] ES (un) (46185):        MT x 1     R22.4 - localized swelling of lower limb      Mid: +.5 right greater  Supra: 5cm above: -.5    ASSESSMENT: tolerated. .. GOALS:   Patient stated goal:     [x] Progressing: [] Met: [] Not Met: [] Adjusted    Therapist goals for Patient:   Short Term Goals: To be achieved in: 2 weeks  1. Independent in HEP and progression per patient tolerance, in order to prevent re-injury. [x] Progressing: [] Met: [] Not Met: [] Adjusted   2. Patient will have a decrease in pain to facilitate improvement in movement, function, and ADLs as indicated by Functional Deficits. [x] Progressing: [] Met: [] Not Met: [] Adjusted    Long Term Goals: To be achieved in:   12 weeks  - The patient is expected to demonstrate less than 30% impairment, limitation or restriction in:  - walking and moving around (mobility)  - Patient will demonstrate increased passive and active ROM to 0-130 degrees to allow for proper joint functioning as indicated by patients Functional Deficits. - Patient will demonstrate an increase in Strength to 4/5 strength to allow for proper functional mobility as indicated by patients Functional Deficits.     [x] Progressing: [] Met: [] Not Met: [] Adjusted  - Patient

## 2023-04-28 ENCOUNTER — HOSPITAL ENCOUNTER (OUTPATIENT)
Dept: PHYSICAL THERAPY | Age: 38
Setting detail: THERAPIES SERIES
Discharge: HOME | End: 2023-04-28
Payer: COMMERCIAL

## 2023-04-28 PROCEDURE — 97140 MANUAL THERAPY 1/> REGIONS: CPT | Performed by: SPECIALIST/TECHNOLOGIST

## 2023-04-28 PROCEDURE — 97016 VASOPNEUMATIC DEVICE THERAPY: CPT | Performed by: SPECIALIST/TECHNOLOGIST

## 2023-04-28 PROCEDURE — 97110 THERAPEUTIC EXERCISES: CPT | Performed by: SPECIALIST/TECHNOLOGIST

## 2023-04-28 NOTE — FLOWSHEET NOTE
[facility] and [address],  Sports Performance and Rehabilitation, [facility] [address]  [address] [place] [address],[address]   Cm Sanabria  Phone: [phone]  Fax: [phone]     Physical Therapy Treatment Note/ Progress Report:           Date:  2023    Patient Name:  Basil Hutchinson    :  1985  MRN: [id number]  Restrictions/Precautions:    Medical/Treatment Diagnosis Information:    M17.11 (ICD-10-CM) - Primary osteoarthritis of right knee       M25.561 - right knee pain  R22.4 - localized swelling of lower limb                                        Insurance/Certification information:     Physician Information:     Has the plan of care been signed (Y/N):        []  Yes  [x]  No     Date of Patient follow up with Physician:       Is this a Progress Report:     []  Yes  [x]  No        If Yes:  Date Range for reporting period:  Beginning  Ending    Progress report will be due (10 Rx or 30 days whichever is less):        Recertification will be due (POC Duration  / 90 days whichever is less):          Visit # Insurance Allowable Auth Required   19  []  Yes []  No        Functional Scale:     Date assessed:        Latex Allergy:  [x]NO      []YES  Preferred Language for Healthcare:   [x]English       []other:      Pain level:  4-5/10     SUBJECTIVE:  pt. Reports descending stairs continue to be difficult.       OBJECTIVE:    Observation: 4/5 knee   Test measurements:      RESTRICTIONS/PRECAUTIONS:     Exercises/Interventions:       Therapeutic Ex (27706) Sets/sec Reps Notes/CUES   Seated hamstring stretch   4 x 20\"    Quad sets      Saq/laq  Slr's (flex, abd)  25x ea    Eob knee flexion      ERMI  5 x 1'    Reformer      Press - SL  95# - 25x    Slt brd   3x 20\"    Lsd   2\" - 2 x 10    Step overs   10x    Ext's/hamstrings     Sls (airex)       Heel prop            olga      25x each    3 x 20\"      6'             40# 30xB abd    Manual Intervention (..97.60)

## 2023-05-01 ENCOUNTER — HOSPITAL ENCOUNTER (OUTPATIENT)
Dept: PHYSICAL THERAPY | Age: 38
Setting detail: THERAPIES SERIES
Discharge: HOME | End: 2023-05-01
Payer: COMMERCIAL

## 2023-05-01 PROCEDURE — 97110 THERAPEUTIC EXERCISES: CPT | Performed by: PHYSICAL THERAPIST

## 2023-05-01 PROCEDURE — 97140 MANUAL THERAPY 1/> REGIONS: CPT | Performed by: PHYSICAL THERAPIST

## 2023-05-04 ENCOUNTER — HOSPITAL ENCOUNTER (OUTPATIENT)
Dept: PHYSICAL THERAPY | Age: 38
Setting detail: THERAPIES SERIES
Discharge: HOME | End: 2023-05-04
Payer: COMMERCIAL

## 2023-05-04 PROCEDURE — 97140 MANUAL THERAPY 1/> REGIONS: CPT | Performed by: SPECIALIST/TECHNOLOGIST

## 2023-05-04 PROCEDURE — 97110 THERAPEUTIC EXERCISES: CPT | Performed by: SPECIALIST/TECHNOLOGIST

## 2023-05-04 NOTE — FLOWSHEET NOTE
[facility] and [address],  Sports Performance and Rehabilitation, [facility] [address]  [address] [facility],[address]   Cm Sanabria  Phone: [phone]  Fax: [phone]     Physical Therapy Treatment Note/ Progress Report:           Date:  2023    Patient Name:  Nilda Martinez    :  1985  MRN: [id number]  Restrictions/Precautions:    Medical/Treatment Diagnosis Information:    M17.11 (ICD-10-CM) - Primary osteoarthritis of right knee       M25.561 - right knee pain  R22.4 - localized swelling of lower limb                                        Insurance/Certification information:     Physician Information:     Has the plan of care been signed (Y/N):        []  Yes  [x]  No     Date of Patient follow up with Physician:       Is this a Progress Report:     []  Yes  [x]  No        If Yes:  Date Range for reporting period:  Beginning  Ending    Progress report will be due (10 Rx or 30 days whichever is less):        Recertification will be due (POC Duration  / 90 days whichever is less):          Visit # Insurance Allowable Auth Required   1         Functional Scale:     Date assessed:        Latex Allergy:  [x]NO      []YES  Preferred Language for Healthcare:   [x]English       []other:      Pain level:  4-/10     SUBJECTIVE:  \"doing better. .. but still not real comfortable later in the day\"    OBJECTIVE:    Observation: 4/5 knee   Test measurements:      RESTRICTIONS/PRECAUTIONS:     Exercises/Interventions:       Therapeutic Ex (64840) Sets/sec Reps Notes/CUES   Seated hamstring stretch   4 x 20\"    Quad sets      Saq/laq  Slr's (flex, abd)  25x ea    Eob knee flexion      ERMI  5 x 1'    Reformer      Press - SL  95# - 25x    Slt brd   3x 20\"    Lsd   2\" - 2 x 10    Step overs   10x    Ext's/hamstrings     Sls (airex)       Heel prop            olga      25x each    3 x 20\"      6'             40# 30xB abd    Manual Intervention (.97.60)

## 2023-05-04 NOTE — FLOWSHEET NOTE
Sports   Comments:                               PLAN: See eval  [x] Continue per plan of care [] Alter current plan (see comments above)  [] Plan of care initiated [] Hold pending MD visit [] Discharge      Electronically signed by:  Fariba Batres, PTA  [id number], Sterling Hodge, PT, MPT     Note: If patient does not return for scheduled/ recommended follow up visits, this note will serve as a discharge from care along with most recent update on progress.

## 2023-05-08 ENCOUNTER — HOSPITAL ENCOUNTER (OUTPATIENT)
Dept: PHYSICAL THERAPY | Age: 38
Setting detail: THERAPIES SERIES
End: 2023-05-08
Payer: COMMERCIAL

## 2023-05-08 PROCEDURE — 97110 THERAPEUTIC EXERCISES: CPT | Performed by: PHYSICAL THERAPIST

## 2023-05-12 ENCOUNTER — HOSPITAL ENCOUNTER (OUTPATIENT)
Dept: PHYSICAL THERAPY | Age: 38
Setting detail: THERAPIES SERIES
Discharge: HOME | End: 2023-05-12
Payer: COMMERCIAL

## 2023-05-12 PROCEDURE — 97112 NEUROMUSCULAR REEDUCATION: CPT | Performed by: PHYSICAL THERAPIST

## 2023-05-12 PROCEDURE — 97110 THERAPEUTIC EXERCISES: CPT | Performed by: PHYSICAL THERAPIST

## 2023-05-15 ENCOUNTER — HOSPITAL ENCOUNTER (OUTPATIENT)
Dept: PHYSICAL THERAPY | Age: 38
Setting detail: THERAPIES SERIES
Discharge: HOME | End: 2023-05-15
Payer: COMMERCIAL

## 2023-05-15 PROCEDURE — 97110 THERAPEUTIC EXERCISES: CPT | Performed by: SPECIALIST/TECHNOLOGIST

## 2023-05-15 PROCEDURE — 97140 MANUAL THERAPY 1/> REGIONS: CPT | Performed by: SPECIALIST/TECHNOLOGIST

## 2023-05-15 NOTE — FLOWSHEET NOTE
improvement in movement, function, and ADLs as indicated by Functional Deficits. [x] Progressing: [] Met: [] Not Met: [] Adjusted    Long Term Goals: To be achieved in:   12 weeks  - The patient is expected to demonstrate less than 30% impairment, limitation or restriction in:  - walking and moving around (mobility)  - Patient will demonstrate increased passive and active ROM to 0-130 degrees to allow for proper joint functioning as indicated by patients Functional Deficits. - Patient will demonstrate an increase in Strength to 4/5 strength to allow for proper functional mobility as indicated by patients Functional Deficits. [x] Progressing: [] Met: [] Not Met: [] Adjusted  - Patient will return to desired, higher level,  functional activities without increased symptoms or restriction. [x] Progressing: [] Met: [] Not Met: [] Adjusted  -only mild qualitative deviations with unassisted gait on level surface. [x] Progressing: [] Met: [] Not Met: [] Adjusted    Overall Progression Towards Functional goals/ Treatment Progress Update:  [] Patient is progressing as expected towards functional goals listed. [] Progression is slowed due to complexities/Impairments listed. [] Progression has been slowed due to co-morbidities.   [x] Plan just implemented, too soon to assess goals progression <30days   [] Goals require adjustment due to lack of progress  [] Patient is not progressing as expected and requires additional follow up with physician  [] Other    Prognosis for POC: [x] Good [] Fair  [] Poor      Patient requires continued skilled intervention: [x] Yes  [] No    Treatment/Activity Tolerance:  [x] Patient able to complete treatment  [] Patient limited by fatigue  [] Patient limited by pain    [] Patient limited by other medical complications  [] Other:         Return to Play: (if applicable)   []  Stage 1: Intro to Strength   []  Stage 2: Return to Run and Strength   []  Stage 3: Return to

## 2023-05-18 ENCOUNTER — HOSPITAL ENCOUNTER (OUTPATIENT)
Dept: PHYSICAL THERAPY | Age: 38
Setting detail: THERAPIES SERIES
Discharge: HOME | End: 2023-05-18
Payer: COMMERCIAL

## 2023-05-18 PROCEDURE — 97112 NEUROMUSCULAR REEDUCATION: CPT | Performed by: SPECIALIST/TECHNOLOGIST

## 2023-05-18 PROCEDURE — 97110 THERAPEUTIC EXERCISES: CPT | Performed by: SPECIALIST/TECHNOLOGIST

## 2023-05-18 NOTE — FLOWSHEET NOTE
[facility] and [address],  Sports Performance and Rehabilitation, [facility] 6199 [address]  793 [facility],5Th Floor   [place]  Phone: [phone]  Fax: [phone]     Physical Therapy Treatment Note/ Progress Report:           Date:  2023    Patient Name:  Allison Villa    :  1985  MRN: [id number]  Restrictions/Precautions:    Medical/Treatment Diagnosis Information:    M17.11 (ICD-10-CM) - Primary osteoarthritis of right knee       M25.561 - right knee pain  R22.4 - localized swelling of lower limb                                        Insurance/Certification information:     Physician Information:     Has the plan of care been signed (Y/N):        []  Yes  [x]  No     Date of Patient follow up with Physician:       Is this a Progress Report:     []  Yes  [x]  No        If Yes:  Date Range for reporting period:  Beginning  Ending    Progress report will be due (10 Rx or 30 days whichever is less):        Recertification will be due (POC Duration  / 90 days whichever is less):          Visit # Insurance Allowable Auth Required   5 New insurance as of 23 []  Yes []  No        Functional Scale:     Date assessed:        Latex Allergy:  [x]NO      []YES  Preferred Language for Healthcare:   [x]English       []other:      Pain level:  4/10     SUBJECTIVE:  Pt. Reports her knee continues to be sore behind her knee. Pt. Reports stairs is what she struggles the most with when it comes to ADL's. OBJECTIVE:    Observation:  knee   Test measurements:      RESTRICTIONS/PRECAUTIONS: DOS 23    Exercises/Interventions:       Therapeutic Ex (64286) Sets/sec Reps Notes/CUES   bike  5'    Seated hamstring stretch   3 x 30\"    Quad sets      Saq  30x  NV    Slr's (flex)  Nmr - 10'    Eob knee flexion      ERMI  5 x 1' NV ?    Reformer      Press - SL  95# - 30x    Slt brd   3x 30\"    Lsd   2\" - 2 x 10    FSU  6\" x 15     Step overs   4\" 15x

## 2023-05-24 ENCOUNTER — HOSPITAL ENCOUNTER (OUTPATIENT)
Dept: PHYSICAL THERAPY | Age: 38
Setting detail: THERAPIES SERIES
Discharge: HOME | End: 2023-05-24
Payer: COMMERCIAL

## 2023-05-24 PROCEDURE — 97112 NEUROMUSCULAR REEDUCATION: CPT | Performed by: SPECIALIST/TECHNOLOGIST

## 2023-05-24 PROCEDURE — 97110 THERAPEUTIC EXERCISES: CPT | Performed by: SPECIALIST/TECHNOLOGIST

## 2023-05-24 NOTE — FLOWSHEET NOTE
The [facility] and [address],  Sports Performance and Rehabilitation, [facility] [address]99 [address]  793 [facility],5Th Floor   [place], [place]  Phone: [phone]  Fax: [phone]     Physical Therapy Treatment Note/ Progress Report:           Date:  2023    Patient Name:  Bernarda Miranda    :  1985  MRN: [id number]  Restrictions/Precautions:    Medical/Treatment Diagnosis Information:    M17.11 (ICD-10-CM) - Primary osteoarthritis of right knee       M25.561 - right knee pain  R22.4 - localized swelling of lower limb                                        Insurance/Certification information:     Physician Information:     Has the plan of care been signed (Y/N):        []  Yes  [x]  No     Date of Patient follow up with Physician:       Is this a Progress Report:     []  Yes  [x]  No        If Yes:  Date Range for reporting period:  Beginning  Ending    Progress report will be due (10 Rx or 30 days whichever is less):        Recertification will be due (POC Duration  / 90 days whichever is less):          Visit # Insurance Allowable Auth Required   6 New insurance as of 23 []  Yes []  No        Functional Scale:     Date assessed:        Latex Allergy:  [x]NO      []YES  Preferred Language for Healthcare:   [x]English       []other:      Pain level:  3/10     SUBJECTIVE:  Pt. Reports descending stairs continue to be difficult.       OBJECTIVE:    Observation:  knee   Test measurements:      RESTRICTIONS/PRECAUTIONS: DOS 23    Exercises/Interventions:       Therapeutic Ex (29106) Sets/sec Reps Notes/CUES   bike  5'    Seated hamstring stretch   3 x 30\"    Quad sets      Saq  30x  NV    Slr's (flex)     Eob knee flexion      ERMI  5 x 1'    Reformer      Press - SL  105# - 30x    Slt brd   3x 30\"    Lsd   2\" - 2 x 10    FSU  6\" 2 x 10    Step overs   4\" 15x    /hamstrings - SL     Sls (airex)       Heel prop            olga      55# x 30    3 x

## 2023-05-26 ENCOUNTER — HOSPITAL ENCOUNTER (OUTPATIENT)
Dept: PHYSICAL THERAPY | Age: 38
Setting detail: THERAPIES SERIES
Discharge: HOME | End: 2023-05-26
Payer: COMMERCIAL

## 2023-05-26 PROCEDURE — 97112 NEUROMUSCULAR REEDUCATION: CPT | Performed by: SPECIALIST/TECHNOLOGIST

## 2023-05-26 PROCEDURE — 97110 THERAPEUTIC EXERCISES: CPT | Performed by: SPECIALIST/TECHNOLOGIST

## 2023-05-26 NOTE — FLOWSHEET NOTE
The [facility] and [address],  Sports Performance and Rehabilitation, Pending sale to [facility] [address] [place] [address]  793 [facility],5Th Floor   [place]  Phone: [phone]  Fax: [phone]     Physical Therapy Treatment Note/ Progress Report:           Date:  2023    Patient Name:  Lonny Hogue    :  1985  MRN: [id number]  Restrictions/Precautions:    Medical/Treatment Diagnosis Information:    M17.11 (ICD-10-CM) - Primary osteoarthritis of right knee       M25.561 - right knee pain  R22.4 - localized swelling of lower limb                                        Insurance/Certification information:     Physician Information:     Has the plan of care been signed (Y/N):        []  Yes  [x]  No     Date of Patient follow up with Physician:       Is this a Progress Report:     []  Yes  [x]  No        If Yes:  Date Range for reporting period:  Beginning  Ending    Progress report will be due (10 Rx or 30 days whichever is less):        Recertification will be due (POC Duration  / 90 days whichever is less):          Visit # Insurance Allowable Auth Required   7 New insurance as of 23 []  Yes []  No        Functional Scale:     Date assessed:        Latex Allergy:  [x]NO      []YES  Preferred Language for Healthcare:   [x]English       []other:      Pain level:  2-3/10     SUBJECTIVE:  Pt. Reports descending stairs continue to be difficult. Pt. Reports she is having less knee soreness and feels more stable compare to last week.       OBJECTIVE:    Observation:  knee   Test measurements:      RESTRICTIONS/PRECAUTIONS: DOS 23    Exercises/Interventions:       Therapeutic Ex (26516) Sets/sec Reps Notes/CUES   bike  5'    Seated hamstring stretch   3 x 30\"    Quad sets      Saq  30x  NV    Slr's (flex)     Eob knee flexion      ERMI  5 x 1' NV   Reformer      Press - SL  105# - 30x    Slt brd   3x 30\"    Lsd   2\" - 2 x 10    FSU  6\" 2 x 10    Step overs   4\" 15x

## 2023-05-31 ENCOUNTER — HOSPITAL ENCOUNTER (OUTPATIENT)
Dept: PHYSICAL THERAPY | Age: 38
Setting detail: THERAPIES SERIES
Discharge: HOME | End: 2023-05-31
Payer: COMMERCIAL

## 2023-05-31 PROCEDURE — 97112 NEUROMUSCULAR REEDUCATION: CPT | Performed by: PHYSICAL THERAPIST

## 2023-05-31 PROCEDURE — 97110 THERAPEUTIC EXERCISES: CPT | Performed by: PHYSICAL THERAPIST

## 2023-05-31 PROCEDURE — 97016 VASOPNEUMATIC DEVICE THERAPY: CPT | Performed by: PHYSICAL THERAPIST

## 2023-06-02 ENCOUNTER — HOSPITAL ENCOUNTER (OUTPATIENT)
Dept: PHYSICAL THERAPY | Age: 38
Setting detail: THERAPIES SERIES
Discharge: HOME | End: 2023-06-02
Payer: COMMERCIAL

## 2023-06-02 PROCEDURE — 97140 MANUAL THERAPY 1/> REGIONS: CPT | Performed by: SPECIALIST/TECHNOLOGIST

## 2023-06-02 PROCEDURE — 97110 THERAPEUTIC EXERCISES: CPT | Performed by: SPECIALIST/TECHNOLOGIST

## 2023-06-02 PROCEDURE — 97112 NEUROMUSCULAR REEDUCATION: CPT | Performed by: SPECIALIST/TECHNOLOGIST

## 2023-06-02 NOTE — FLOWSHEET NOTE
The 1100 [facility] and [address],  Sports Performance and Rehabilitation, [facility] 0299 [address]  793 [facility],5Th Floor   [place], [place]  Phone: [phone]  Fax: [phone]     Physical Therapy Treatment Note/ Progress Report:           Date:  2023    Patient Name:  Ivett Herron    :  1985  MRN: [id number]  Restrictions/Precautions:    Medical/Treatment Diagnosis Information:    M17.11 (ICD-10-CM) - Primary osteoarthritis of right knee       M25.561 - right knee pain  R22.4 - localized swelling of lower limb                                        Insurance/Certification information:     Physician Information:     Has the plan of care been signed (Y/N):        []  Yes  [x]  No     Date of Patient follow up with Physician:       Is this a Progress Report:     []  Yes  [x]  No        If Yes:  Date Range for reporting period:  Beginning  Ending    Progress report will be due (10 Rx or 30 days whichever is less):        Recertification will be due (POC Duration  / 90 days whichever is less):          Visit # Insurance Allowable Auth Required   8 New insurance as of 23 []  Yes []  No        Functional Scale:     Date assessed:        Latex Allergy:  [x]NO      []YES  Preferred Language for Healthcare:   [x]English       []other:      Pain level:  2-3/10     SUBJECTIVE:  Pt. Reports descending stairs continue to be difficult but ascending stairs is not a problem at this time.       OBJECTIVE:    Observation:  knee   Test measurements:      RESTRICTIONS/PRECAUTIONS: DOS 23    Exercises/Interventions:       Therapeutic Ex (71595) Sets/sec Reps Notes/CUES   bike  5'    Seated hamstring stretch   3 x 30\"    Quad sets   Nmr - 5' 10/10   Saq  NMR 5'   10/10   Slr's (flex)  Nmr - 5' 10/10   Eob knee flexion      ERMI  5 x 1' NV   Reformer      Press - SL  105# - 30x    Slt brd   3x 30\"    Lsd   2\" - 2 x 10 NV   FSU  6\" 2 x 10 NV   Step overs   4\" 15x NV

## 2023-06-06 ENCOUNTER — HOSPITAL ENCOUNTER (OUTPATIENT)
Dept: PHYSICAL THERAPY | Age: 38
Setting detail: THERAPIES SERIES
Discharge: HOME | End: 2023-06-06
Payer: COMMERCIAL

## 2023-06-06 PROCEDURE — 97110 THERAPEUTIC EXERCISES: CPT | Performed by: PHYSICAL THERAPIST

## 2023-06-06 PROCEDURE — 97016 VASOPNEUMATIC DEVICE THERAPY: CPT | Performed by: PHYSICAL THERAPIST

## 2023-06-06 PROCEDURE — 97112 NEUROMUSCULAR REEDUCATION: CPT | Performed by: PHYSICAL THERAPIST

## 2023-06-07 RX ORDER — LOSARTAN POTASSIUM 50 MG/1
TABLET ORAL
Qty: 90 TABLET | Refills: 1 | Status: SHIPPED | OUTPATIENT
Start: 2023-06-07

## 2023-06-08 NOTE — FLOWSHEET NOTE
Tolerance:  [x] Patient able to complete treatment  [] Patient limited by fatigue  [] Patient limited by pain    [] Patient limited by other medical complications  [] Other:         Return to Play: (if applicable)   []  Stage 1: Intro to Strength   []  Stage 2: Return to Run and Strength   []  Stage 3: Return to Jump and Strength   []  Stage 4: Dynamic Strength and Agility   []  Stage 5: Sport Specific Training     []  Ready to Return to Play, Meets All Above Stages   []  Not Ready for Return to Sports   Comments:                               PLAN: See eval  [x] Continue per plan of care [] Alter current plan (see comments above)  [] Plan of care initiated [] Hold pending MD visit [] Discharge      Electronically signed by:  Eduar Keen PT, MPT     Note: If patient does not return for scheduled/ recommended follow up visits, this note will serve as a discharge from care along with most recent update on progress.

## 2023-06-28 ENCOUNTER — HOSPITAL ENCOUNTER (OUTPATIENT)
Dept: PHYSICAL THERAPY | Age: 38
Setting detail: THERAPIES SERIES
End: 2023-06-28
Payer: COMMERCIAL

## 2023-06-28 PROCEDURE — 97110 THERAPEUTIC EXERCISES: CPT | Performed by: PHYSICAL THERAPIST

## 2023-07-03 NOTE — FLOWSHEET NOTE
[facility] and [address] [place] [address] [address],  Sports Performance and Rehabilitation, [address] [place] [address]  200 [facility], [address] [place] [address] Avenue  Phone: [phone]  Fax: [phone]     Physical Therapy Treatment Note/ Progress Report:           Date:  2023    Patient Name:  Sari Massey    :  1985  MRN: [id number]  Restrictions/Precautions:    Medical/Treatment Diagnosis Information:    M17.11 (ICD-10-CM) - Primary osteoarthritis of right knee       M25.561 - right knee pain  R22.4 - localized swelling of lower limb                                        Insurance/Certification information:     Physician Information:     Has the plan of care been signed (Y/N):        []  Yes  [x]  No     Date of Patient follow up with Physician:       Is this a Progress Report:     []  Yes  [x]  No        If Yes:  Date Range for reporting period:  Beginning  Ending    Progress report will be due (10 Rx or 30 days whichever is less):        Recertification will be due (POC Duration  / 90 days whichever is less):          Visit # Insurance Allowable Auth Required   11 New insurance as of 23 []  Yes []  No        Functional Scale:     Date assessed:        Latex Allergy:  [x]NO      []YES  Preferred Language for Healthcare:   [x]English       []other:      Pain level:  2-3/10     SUBJECTIVE: \"surprisingly sore after driving a lot last week\"    OBJECTIVE:    Observation:  global   Test measurements:      RESTRICTIONS/PRECAUTIONS: DOS 23    Exercises/Interventions:       Therapeutic Ex (84607) Sets/sec Reps Notes/CUES   bike  5'    Seated hamstring stretch   3 x 30\"    Quad sets   Nmr - 5' 10/10   Saq  NMR 5'   10/10   Slr's (flex)  Nmr - 5' 10/10   Eob knee flexion      ERMI  5 x 1' NV   Reformer      Press - SL  105# - 30x    Slt brd   3x 30\"    Lsd   2\" - 2 x 10 NV   FSU  6\" 2 x 10 NV   Step overs   4\" 15x NV        Ext's/hamstrings - SL     Sls (airex)       Heel

## 2023-07-05 ENCOUNTER — HOSPITAL ENCOUNTER (OUTPATIENT)
Dept: PHYSICAL THERAPY | Age: 38
Setting detail: THERAPIES SERIES
Discharge: HOME | End: 2023-07-05
Payer: COMMERCIAL

## 2023-07-05 PROCEDURE — 97110 THERAPEUTIC EXERCISES: CPT | Performed by: PHYSICAL THERAPIST

## 2023-07-05 RX ORDER — CHLORTHALIDONE 25 MG/1
TABLET ORAL
Qty: 90 TABLET | Refills: 1 | Status: SHIPPED | OUTPATIENT
Start: 2023-07-05

## 2023-07-08 NOTE — FLOWSHEET NOTE
[facility] and [address] [place] [address] [address],  Sports Performance and Rehabilitation, [address] [place] [address]  [address]  Phone: [phone]  Fax: [phone]     Physical Therapy Treatment Note/ Progress Report:           Date:  2023    Patient Name:  Kristine Castañeda    :  1985  MRN: [id number]  Restrictions/Precautions:    Medical/Treatment Diagnosis Information:    M17.11 (ICD-10-CM) - Primary osteoarthritis of right knee       M25.561 - right knee pain  R22.4 - localized swelling of lower limb                                        Insurance/Certification information:     Physician Information:     Has the plan of care been signed (Y/N):        []  Yes  [x]  No     Date of Patient follow up with Physician:       Is this a Progress Report:     []  Yes  [x]  No        If Yes:  Date Range for reporting period:  Beginning  Ending    Progress report will be due (10 Rx or 30 days whichever is less):        Recertification will be due (POC Duration  / 90 days whichever is less):          Visit # Insurance Allowable Auth Required   12 New insurance as of 23 []  Yes []  No        Functional Scale:     Date assessed:        Latex Allergy:  [x]NO      []YES  Preferred Language for Healthcare:   [x]English       []other:      Pain level:  2-3/10     SUBJECTIVE: \"doing a bit better\"    OBJECTIVE:    Observation:  global   Test measurements:      RESTRICTIONS/PRECAUTIONS: DOS 23    Exercises/Interventions:       Therapeutic Ex (24310) Sets/sec Reps Notes/CUES   bike  5'    Seated hamstring stretch   3 x 30\"    Quad sets   Nmr - 15' 10/10   Saq  NMR 5'   10/10   Slr's (flex, abd)  30x mr    Eob knee flexion      ERMI  5 x 1' NV   Reformer      Press - SL  105# - 30x    Slt brd   3x 30\"    Lsd   4\" - 2 x 10 NV   FSU  NV   Step overs   NV        Ext's/hamstrings - SL     Sls (airex)       Heel prop            olga      10# 3 x 55# x

## 2023-07-12 ENCOUNTER — HOSPITAL ENCOUNTER (OUTPATIENT)
Dept: PHYSICAL THERAPY | Age: 38
Setting detail: THERAPIES SERIES
Discharge: HOME | End: 2023-07-12
Payer: COMMERCIAL

## 2023-07-12 DIAGNOSIS — I10 ESSENTIAL HYPERTENSION: ICD-10-CM

## 2023-07-12 DIAGNOSIS — R73.02 IMPAIRED GLUCOSE TOLERANCE: ICD-10-CM

## 2023-07-12 LAB
ALBUMIN SERPL-MCNC: 4.5 G/DL (ref 3.4–5)
ALBUMIN/GLOB SERPL: 1.7 {RATIO} (ref 1.1–2.2)
ALP SERPL-CCNC: 77 U/L (ref 40–129)
ALT SERPL-CCNC: 21 U/L (ref 10–40)
ANION GAP SERPL CALCULATED.3IONS-SCNC: 14 MMOL/L (ref 3–16)
AST SERPL-CCNC: 23 U/L (ref 15–37)
BASOPHILS # BLD: 0 K/UL (ref 0–0.2)
BASOPHILS NFR BLD: 0.3 %
BILIRUB SERPL-MCNC: 0.3 MG/DL (ref 0–1)
BUN SERPL-MCNC: 13 MG/DL (ref 7–20)
CALCIUM SERPL-MCNC: 9.9 MG/DL (ref 8.3–10.6)
CHLORIDE SERPL-SCNC: 101 MMOL/L (ref 99–110)
CHOLEST SERPL-MCNC: 187 MG/DL (ref 0–199)
CO2 SERPL-SCNC: 24 MMOL/L (ref 21–32)
CREAT SERPL-MCNC: 0.8 MG/DL (ref 0.6–1.1)
DEPRECATED RDW RBC AUTO: 16.6 % (ref 12.4–15.4)
EOSINOPHIL # BLD: 0.1 K/UL (ref 0–0.6)
EOSINOPHIL NFR BLD: 1.1 %
GFR SERPLBLD CREATININE-BSD FMLA CKD-EPI: >60 ML/MIN/{1.73_M2}
GLUCOSE SERPL-MCNC: 131 MG/DL (ref 70–99)
HCT VFR BLD AUTO: 37.9 % (ref 36–48)
HDLC SERPL-MCNC: 35 MG/DL (ref 40–60)
HGB BLD-MCNC: 12.5 G/DL (ref 12–16)
LDL CHOLESTEROL CALCULATED: ABNORMAL MG/DL
LDLC SERPL-MCNC: 104 MG/DL
LYMPHOCYTES # BLD: 1.9 K/UL (ref 1–5.1)
LYMPHOCYTES NFR BLD: 25 %
MCH RBC QN AUTO: 26.8 PG (ref 26–34)
MCHC RBC AUTO-ENTMCNC: 33 G/DL (ref 31–36)
MCV RBC AUTO: 81.4 FL (ref 80–100)
MONOCYTES # BLD: 0.6 K/UL (ref 0–1.3)
MONOCYTES NFR BLD: 7.9 %
NEUTROPHILS # BLD: 5.1 K/UL (ref 1.7–7.7)
NEUTROPHILS NFR BLD: 65.7 %
PLATELET # BLD AUTO: 308 K/UL (ref 135–450)
PMV BLD AUTO: 8 FL (ref 5–10.5)
POTASSIUM SERPL-SCNC: 3.5 MMOL/L (ref 3.5–5.1)
PROT SERPL-MCNC: 7.2 G/DL (ref 6.4–8.2)
RBC # BLD AUTO: 4.65 M/UL (ref 4–5.2)
SODIUM SERPL-SCNC: 139 MMOL/L (ref 136–145)
TRIGL SERPL-MCNC: 307 MG/DL (ref 0–150)
VLDLC SERPL CALC-MCNC: ABNORMAL MG/DL
WBC # BLD AUTO: 7.7 K/UL (ref 4–11)

## 2023-07-12 PROCEDURE — 97110 THERAPEUTIC EXERCISES: CPT | Performed by: PHYSICAL THERAPIST

## 2023-07-13 LAB
EST. AVERAGE GLUCOSE BLD GHB EST-MCNC: 134.1 MG/DL
HBA1C MFR BLD: 6.3 %

## 2023-07-19 ENCOUNTER — HOSPITAL ENCOUNTER (OUTPATIENT)
Dept: PHYSICAL THERAPY | Age: 38
Setting detail: THERAPIES SERIES
Discharge: HOME | End: 2023-07-19
Payer: COMMERCIAL

## 2023-07-22 NOTE — FLOWSHEET NOTE
Short Term Goals: To be achieved in: 2 weeks  1. Independent in HEP and progression per patient tolerance, in order to prevent re-injury. [x] Progressing: [] Met: [] Not Met: [] Adjusted   2. Patient will have a decrease in pain to facilitate improvement in movement, function, and ADLs as indicated by Functional Deficits. [x] Progressing: [] Met: [] Not Met: [] Adjusted    Long Term Goals: To be achieved in:   12 weeks  - The patient is expected to demonstrate less than 30% impairment, limitation or restriction in:  - walking and moving around (mobility)  - Patient will demonstrate increased passive and active ROM to 0-130 degrees to allow for proper joint functioning as indicated by patients Functional Deficits. - Patient will demonstrate an increase in Strength to 4/5 strength to allow for proper functional mobility as indicated by patients Functional Deficits. [x] Progressing: [] Met: [] Not Met: [] Adjusted  - Patient will return to desired, higher level,  functional activities without increased symptoms or restriction. [x] Progressing: [] Met: [] Not Met: [] Adjusted  -only mild qualitative deviations with unassisted gait on level surface. [x] Progressing: [] Met: [] Not Met: [] Adjusted    Overall Progression Towards Functional goals/ Treatment Progress Update:  [] Patient is progressing as expected towards functional goals listed. [] Progression is slowed due to complexities/Impairments listed. [] Progression has been slowed due to co-morbidities.   [x] Plan just implemented, too soon to assess goals progression <30days   [] Goals require adjustment due to lack of progress  [] Patient is not progressing as expected and requires additional follow up with physician  [] Other    Prognosis for POC: [x] Good [] Fair  [] Poor      Patient requires continued skilled intervention: [x] Yes  [] No    Treatment/Activity Tolerance:  [x] Patient able to complete treatment  [] Patient

## 2023-08-16 ENCOUNTER — HOSPITAL ENCOUNTER (OUTPATIENT)
Dept: PHYSICAL THERAPY | Age: 38
Setting detail: THERAPIES SERIES
Discharge: HOME | End: 2023-08-16
Payer: COMMERCIAL

## 2023-08-16 PROCEDURE — 97110 THERAPEUTIC EXERCISES: CPT | Performed by: PHYSICAL THERAPIST

## 2023-08-19 NOTE — FLOWSHEET NOTE
[facility] and [address] [place] [address] [address],  Sports Performance and Rehabilitation, [address] [place] [address]  200 [facility], [address] [place] [address] Avenue  Phone: [phone]  Fax: [phone]     Physical Therapy Treatment Note/ Progress Report:           Date:  2023    Patient Name:  Pranav [place]    :  1985  MRN: [id number]  Restrictions/Precautions:    Medical/Treatment Diagnosis Information:    M17.11 (ICD-10-CM) - Primary osteoarthritis of right knee       M25.561 - right knee pain  R22.4 - localized swelling of lower limb                                        Insurance/Certification information:     Physician Information:     Has the plan of care been signed (Y/N):        []  Yes  [x]  No     Date of Patient follow up with Physician:       Is this a Progress Report:     []  Yes  [x]  No        If Yes:  Date Range for reporting period:  Beginning  Ending    Progress report will be due (10 Rx or 30 days whichever is less):        Recertification will be due (POC Duration  / 90 days whichever is less):          Visit # Insurance Allowable Auth Required   17 since may 1 20 []  Yes []  No        Functional Scale:     Date assessed:        Latex Allergy:  [x]NO      []YES  Preferred Language for Healthcare:   [x]English       []other:      Pain level:  2-3/10     SUBJECTIVE: \"lots of ups and downs\"    OBJECTIVE:    Observation:  global   Test measurements:      RESTRICTIONS/PRECAUTIONS: DOS 23    Exercises/Interventions:       Therapeutic Ex (04817) Sets/sec Reps Notes/CUES   bike  5'    Seated hamstring stretch   3 x 30\"    Quad sets   Nmr - 15' 10/10   Saq  NMR 5'   10/10   Slr's (flex, abd)  30x mr    Eob knee flexion      ERMI  5 x 1' NV   Reformer      Press - SL  105# - 30x    Slt brd   3x 30\"    Lsd   4\" - 2 x 10 NV   FSU  NV   Step overs   NV        Ext's/hamstrings - SL     Sls (airex)       Heel prop            olga      10# 3 x 55# x 30    3 x

## 2023-08-23 ENCOUNTER — HOSPITAL ENCOUNTER (OUTPATIENT)
Dept: PHYSICAL THERAPY | Age: 38
Setting detail: THERAPIES SERIES
End: 2023-08-23
Payer: COMMERCIAL

## 2023-08-30 ENCOUNTER — HOSPITAL ENCOUNTER (OUTPATIENT)
Dept: PHYSICAL THERAPY | Age: 38
Setting detail: THERAPIES SERIES
Discharge: HOME | End: 2023-08-30
Payer: COMMERCIAL

## 2023-08-30 PROCEDURE — 97110 THERAPEUTIC EXERCISES: CPT | Performed by: PHYSICAL THERAPIST

## 2023-08-31 NOTE — FLOWSHEET NOTE
[facility] and [address] [place] [address] [address],  Sports Performance and Rehabilitation, [address] [place] [address]  200 [facility], [address] [place] [address] Avenue  Phone: [phone]  Fax: [phone]     Physical Therapy Treatment Note/ Progress Report:           Date:  2023    Patient Name:  Christine Kocher    :  1985  MRN: [id number]  Restrictions/Precautions:    Medical/Treatment Diagnosis Information:    M17.11 (ICD-10-CM) - Primary osteoarthritis of right knee       M25.561 - right knee pain  R22.4 - localized swelling of lower limb                                        Insurance/Certification information:     Physician Information:     Has the plan of care been signed (Y/N):        []  Yes  [x]  No     Date of Patient follow up with Physician:       Is this a Progress Report:     []  Yes  [x]  No        If Yes:  Date Range for reporting period:  Beginning  Ending    Progress report will be due (10 Rx or 30 days whichever is less):        Recertification will be due (POC Duration  / 90 days whichever is less):          Visit # Insurance Allowable Auth Required   18 since may 1 20 []  Yes []  No        Functional Scale:     Date assessed:        Latex Allergy:  [x]NO      []YES  Preferred Language for Healthcare:   [x]English       []other:      Pain level:  2-3/10     SUBJECTIVE: \"feeling better overall.   Still a little sore by the end of the day, but better on stairs\"    OBJECTIVE:    Observation:  global   Test measurements:      RESTRICTIONS/PRECAUTIONS: DOS 23    Exercises/Interventions:       Therapeutic Ex (99975) Sets/sec Reps Notes/CUES   bike  5'    Seated hamstring stretch   3 x 30\"    Quad sets   Nmr - 15' 10/10   Saq  NMR 5'   10/10   Slr's (flex, abd)  30x mr    Eob knee flexion      ERMI  5 x 1' NV   Reformer      Press - SL  105# - 30x    Slt brd   3x 30\"    Lsd   4\" - 2 x 10 NV   FSU  NV   Step overs   NV        Ext's/hamstrings - SL     Sls (airex)

## 2023-09-06 ENCOUNTER — HOSPITAL ENCOUNTER (OUTPATIENT)
Dept: PHYSICAL THERAPY | Age: 38
Setting detail: THERAPIES SERIES
Discharge: HOME | End: 2023-09-06
Payer: COMMERCIAL

## 2023-09-06 PROCEDURE — 97110 THERAPEUTIC EXERCISES: CPT | Performed by: SPECIALIST/TECHNOLOGIST

## 2023-09-06 NOTE — FLOWSHEET NOTE
Manual Treatments:  PROM / STM / Oscillations-Mobs:  G-I, II, III, IV (PA's, Inf., Post.)  [] (46661) Provided manual therapy to mobilize LE, proximal hip and/or LS spine soft tissue/joints for the purpose of modulating pain, promoting relaxation, increasing ROM, reducing/eliminating soft tissue swelling/inflammation/restriction, improving soft tissue extensibility and allowing for proper ROM for normal function with self-care, mobility, lifting and ambulation. Modalities:  Cp 10'   [] GAME READY (VASO)- for significant edema, swelling, pain control. Charges:  Timed Code Treatment Minutes: 30'   Total Treatment Minutes: 36'       2 pt.'s in 1 timeslot = 9/6/23    [] EVAL (LOW) 08917 (typically 20 minutes face-to-face)  [] EVAL (MOD) 76890 (typically 30 minutes face-to-face)  [] EVAL (HIGH) 68541 (typically 45 minutes face-to-face)  [] RE-EVAL     [x] DW(06000) x  2 Per estimate [] IONTO  [] NMR (45853) x     [] Other: vaso  [] TA x      [] Mech Traction (85722)  [] ES(attended) (74610)      [] ES (un) (73359):         R22.4 - localized swelling of lower limb      Girth L R Post-Vaso   Mid Patella  58 cm 58 cm   Suprapatellar  61.5 cm 61.3 cm   5cm above   N/A   15cm above   N/A   Vasopneumatic compression applied to knee for significant edema, swelling, pain control. ICD-10 code: R60.9 Edema unspecified. ASSESSMENT: Continues to show good improvements with strength and ROM, though still challenged throughout. Pt. Noted good benefit from [address] session today. Pt. Experience pain free activities in [address] but was fatigued at the end of tx. Requires VC for proper completion of therex to maximize outcomes and increase quad activation. Pt is progressing though strength deficits persist. Pt requires PT follow up to address ROM, strength and functional mobility deficits.       GOALS:   Patient stated goal:     [x] Progressing: [] Met: [] Not Met: [] Adjusted    Therapist goals for Patient:   Short

## 2023-09-15 ENCOUNTER — HOSPITAL ENCOUNTER (OUTPATIENT)
Dept: PHYSICAL THERAPY | Age: 38
Discharge: HOME | End: 2023-09-15

## 2023-09-15 PROCEDURE — 9990000029 HC GAP PACKAGE

## 2023-09-15 NOTE — FLOWSHEET NOTE
[address] [place] [address]  [address] [place] [address], [address] [place] [address], [address] [place] [address]  Phone: [phone]  Fax [phone]                                            Lower Extremity Daily Performance Training Note  Date:  9/15/2023    Patient Name:  Ilya Larkin    :  1985  MRN: [id number]  Restrictions/Precautions:   Medical/Treatment Diagnosis Information:   Right TKR 2023    Basic ADLs - Walk for Distance  [facility]    Physician Information:  Margaritoelif Michael - PRN      Visit Number:  paid 215 on 9-  Billed 215 on 9-    Subjective: Pt states that she still has issues with stairs down more so than up. Left has some OA as well. Objective:  Observation:       Exercises:  Exercise/Equipment 9-   Recumbent Bike 3'   HS Mobs 3D X5 each   Calf Hip Flex Mobs 3D X5 each   Lateral Walk with TB Yellow 1 lap       Bilat Squats 3D X5 each wt shift to L       Step matrix 2D X5 each poor NM control on R       Stair Practice 4 step x2       Post Step Down 1D 4\" x10                                          Home Exercise Program: Reviewed Gym HEp and added Hip Ad/Ad, Eccentric leg Ext and Press    Patient Education:    Reviewed gradual return to activity progressions and concepts. Dis cussed good vs bad soreness. Pt had all questions answered. Assessment:  [x] Patient tolerated treatment well [] Patient limited by fatigue  [] Patient limited by pain  [] Patient limited by other medical complications  [] Other:     Prognosis: [x] Good [] Fair  [] Poor    Patient Requires Follow-up: [x] Yes  [] No    Plan:   [] Continue per plan of care [] Alter current plan (see comments)  [x] Plan of care initiated [] Hold pending MD visit [] Discharge    Electronically signed by:  CARLOS ENRIQUE Levy ATC     Tx was performed by ATC as a part of the [facility] program following PT's recommendations/guidance.        Cosigned by:

## 2023-09-22 ENCOUNTER — HOSPITAL ENCOUNTER (OUTPATIENT)
Dept: PHYSICAL THERAPY | Age: 38
Discharge: HOME | End: 2023-09-22

## 2023-09-22 NOTE — FLOWSHEET NOTE
as a part of the [facility] program following PT's recommendations/guidance.        Cosigned by:

## 2023-09-29 ENCOUNTER — HOSPITAL ENCOUNTER (OUTPATIENT)
Dept: PHYSICAL THERAPY | Age: 38
Discharge: HOME | End: 2023-09-29

## 2023-09-29 NOTE — FLOWSHEET NOTE
[address] [place] [address], [address] [place] [address]  [address] [place] [address], [address] [place] [address], [address] [place] [address]  Phone: [phone]  Fax [phone]                                            Lower Extremity Daily Performance Training Note  Date:  2023    Patient Name:  Colette Velasquez  \"Lesa\"  :  1985  MRN: [id number]  Restrictions/Precautions:   Medical/Treatment Diagnosis Information:   Right TKR 2023    Basic ADLs - Walk for Distance  [facility]    Physician Information:  Maddy Jarquin - REINA      Visit Number: 3/6 paid 215 on 9-  Billed 215 on 9-    Subjective: Pt states that she still has issues with stairs down more so than up. Left has some OA as well. Objective:  Observation:       Exercises:  Exercise/Equipment 2023   Recumbent Bike 3'   HS Mobs 3D X5 each   Calf Hip Flex Mobs 3D X5 each   Lateral Walk with TB Yellow 1 lap       Bilat Squats 3D 5# X12   SL Squats 2D X5 each   Step matrix 2D X10 each       Stair Practice 4 step x2       Post - Lat Step Down 1D 4\" x10 each       Obstacle Course Fwd-Lat X3 each       SL Wall Abd 3D X10 each       Standing ITB Stretch Reviewed for HEP   The Stick Distal ITB 3'   CC Retro Walk 45# x10         Home Exercise Program: Reviewed Gym HEp and added Hip Ad/Ad, Eccentric leg Ext and Press    Patient Education:    Reviewed gradual return to activity progressions and concepts. Discussed good vs bad soreness. Pt had all questions answered.     Assessment:  [x] Patient tolerated treatment well [] Patient limited by fatigue  [] Patient limited by pain  [] Patient limited by other medical complications  [] Other:     Prognosis: [x] Good [] Fair  [] Poor    Patient Requires Follow-up: [x] Yes  [] No    Plan:   [] Continue per plan of care [] Alter current plan (see comments)  [x] Plan of care initiated [] Hold pending MD visit [] Discharge    Electronically signed by:  CARLOS ENRIQUE Mtz, ALEXUS     Tx was performed by

## 2023-10-19 ENCOUNTER — OFFICE VISIT (OUTPATIENT)
Dept: PRIMARY CARE CLINIC | Age: 38
End: 2023-10-19
Payer: COMMERCIAL

## 2023-10-19 VITALS
HEART RATE: 90 BPM | WEIGHT: 293 LBS | DIASTOLIC BLOOD PRESSURE: 75 MMHG | BODY MASS INDEX: 54.62 KG/M2 | SYSTOLIC BLOOD PRESSURE: 115 MMHG | OXYGEN SATURATION: 93 %

## 2023-10-19 DIAGNOSIS — Z96.651 S/P TOTAL KNEE ARTHROPLASTY, RIGHT: ICD-10-CM

## 2023-10-19 DIAGNOSIS — R73.02 IMPAIRED GLUCOSE TOLERANCE: ICD-10-CM

## 2023-10-19 DIAGNOSIS — I10 ESSENTIAL HYPERTENSION: Primary | ICD-10-CM

## 2023-10-19 PROCEDURE — 1036F TOBACCO NON-USER: CPT | Performed by: FAMILY MEDICINE

## 2023-10-19 PROCEDURE — 99213 OFFICE O/P EST LOW 20 MIN: CPT | Performed by: FAMILY MEDICINE

## 2023-10-19 PROCEDURE — G8427 DOCREV CUR MEDS BY ELIG CLIN: HCPCS | Performed by: FAMILY MEDICINE

## 2023-10-19 PROCEDURE — G8417 CALC BMI ABV UP PARAM F/U: HCPCS | Performed by: FAMILY MEDICINE

## 2023-10-19 PROCEDURE — G8484 FLU IMMUNIZE NO ADMIN: HCPCS | Performed by: FAMILY MEDICINE

## 2023-10-19 PROCEDURE — 3078F DIAST BP <80 MM HG: CPT | Performed by: FAMILY MEDICINE

## 2023-10-19 PROCEDURE — 3074F SYST BP LT 130 MM HG: CPT | Performed by: FAMILY MEDICINE

## 2023-10-19 NOTE — PROGRESS NOTES
exercise to support weight loss. Will plan to repeat labs at next visit. New Prescriptions    No medications on file        No orders of the defined types were placed in this encounter.        Return in about 6 months (around 4/19/2024) for Annual physical.         José Wilson DO

## 2023-10-20 ENCOUNTER — HOSPITAL ENCOUNTER (OUTPATIENT)
Dept: PHYSICAL THERAPY | Age: 38
Discharge: HOME | End: 2023-10-20

## 2023-10-20 NOTE — FLOWSHEET NOTE
[address] [place] [address]  [address] [place] [address], [address] [place] [address]  Phone: [phone]  Fax [phone]                                            Lower Extremity Daily Performance Training Note  Date:  10/20/2023    Patient Name:  Dany David  \"Lesa\"  :  1985  MRN: [id number]  Restrictions/Precautions:   Medical/Treatment Diagnosis Information:   Right TKR 2023    Basic ADLs - Walk for Distance  [facility]    Physician Information:  Chandni HUANG      Visit Number:  paid 215 on 9-  Billed 215 on 9-    Subjective: Pt states that she feels much better up/down stairs    Objective:  Observation:       Exercises:  Exercise/Equipment 10-   Recumbent Bike 3'   HS Mobs 3D X5 each   Calf Hip Flex Mobs 3D X5 each   Lateral Walk with TB Yellow 1 lap       Bilat Squats 3D 5# X12   SL Squats 3D X10 each   Step matrix 3D X10 each   Seated Hip ER with TB R/L Yellow 2x10 each   Hip Hikes R/L 4\" 2x10 each   Post - Lat Step Down 1D 4\" x10 each   CC Post Walk 35# x5   CC Lat Walk R/L 35# x5 each   Obstacle Course Fwd-Lat X3 each       SL Wall Abd 3D X10 each       Standing ITB Stretch Reviewed for HEP   The Stick Distal ITB 3'   CC Retro Walk 45# x10         Home Exercise Program: Reviewed Gym HEp and added Hip Ad/Ad, Eccentric leg Ext and Press    Patient Education:    Reviewed gradual return to activity progressions and concepts. Discussed good vs bad soreness. Pt had all questions answered.     Assessment:  [x] Patient tolerated treatment well [] Patient limited by fatigue  [] Patient limited by pain  [] Patient limited by other medical complications  [] Other:     Prognosis: [x] Good [] Fair  [] Poor    Patient Requires Follow-up: [x] Yes  [] No    Plan:   [] Continue per plan of care [] Alter current plan (see comments)  [x] Plan of care initiated [] Hold pending MD visit [] Discharge    Electronically signed by:  Haroon Roca

## 2023-10-24 ASSESSMENT — ENCOUNTER SYMPTOMS
NAUSEA: 0
SHORTNESS OF BREATH: 0
VOMITING: 0
ABDOMINAL PAIN: 0
DIARRHEA: 0

## 2023-10-27 ENCOUNTER — HOSPITAL ENCOUNTER (OUTPATIENT)
Dept: PHYSICAL THERAPY | Age: 38
Discharge: HOME | End: 2023-10-27

## 2023-10-27 NOTE — FLOWSHEET NOTE
[address] [place] [address], [address] [place] [address]  [address] [place] [address], [address] [place] [address], [address] [place] [address]  Phone: [phone]  Fax [phone]                                            Lower Extremity Daily Performance Training Note  Date:  10/27/2023    Patient Name:  Yoselyn Umanzor  \"Lesa\"  :  1985  MRN: [id number]  Restrictions/Precautions:   Medical/Treatment Diagnosis Information:   Right TKR 2023    Basic ADLs - Walk for Distance  [facility]    Physician Information:  Carson Champagne - PRN      Visit Number:  paid 215 on 9-  Billed 215 on 9-    Subjective: Pt states that she was out of town for business all week and that her knees are a little stiff today. Objective:  Observation:       Exercises:  Exercise/Equipment 10-   Recumbent Bike 5'   HS Mobs 3D X5 each   Calf Hip Flex Mobs 3D X5 each   Lateral Walk with TB Yellow 2 laps       Bilat Squats 3D 5# X12   Seated Hip ER with TB R/L Yellow 2x15 each   Hip Hikes R/L 4\" 2x10 each   CC Post Walk 40# x10   CC Lat Walk R/L 40# x10 each       SL Wall Abd 3D 2# X12 each       Standing ITB Stretch Reviewed for HEP   The Stick Distal ITB 3'             Home Exercise Program: Reviewed Gym HEp and added Hip Ad/Ad, Eccentric leg Ext and Press    Patient Education:    Reviewed gradual return to activity progressions and concepts. Discussed good vs bad soreness. Pt had all questions answered.     Assessment:  [x] Patient tolerated treatment well [] Patient limited by fatigue  [] Patient limited by pain  [] Patient limited by other medical complications  [] Other:     Prognosis: [x] Good [] Fair  [] Poor    Patient Requires Follow-up: [x] Yes  [] No    Plan:   [] Continue per plan of care [] Alter current plan (see comments)  [x] Plan of care initiated [] Hold pending MD visit [] Discharge    Electronically signed by:  CARLOS ENRIQUE Huang ATC     Tx was performed by ALEXUS as a part of the [facility] program

## 2023-11-03 ENCOUNTER — HOSPITAL ENCOUNTER (OUTPATIENT)
Dept: PHYSICAL THERAPY | Age: 38
Discharge: HOME | End: 2023-11-03

## 2023-11-03 NOTE — FLOWSHEET NOTE
[address] [place] [address], [address] [place] [address]  [address] [place] [address], [address] [place] [address], [address] [place] [address]  Phone: [phone]  Fax [phone]                                            Lower Extremity Daily Performance Training Note  Date:  11/3/2023    Patient Name:  Christine Ghosh  \"Lesa\"  :  1985  MRN: [id number]  Restrictions/Precautions:   Medical/Treatment Diagnosis Information:   Right TKR 2023    Basic ADLs - Walk for Distance  [facility]    Physician Information:  Sandy Peralta - REINA      Visit Number:  paid 215 on 9-  Billed 215 on 9-    Subjective: Pt states that she feels much better than last week. Objective:  Observation:       Exercises:  Exercise/Equipment 11-3-2023   Recumbent Bike 5'   HS Mobs 3D X5 each   Calf Hip Flex Mobs 3D X5 each   Lateral Walk with TB Yellow 2 laps       Bilat Squats 3D 5# X15   SL Squats 3D X10 each   Step matrix 3D X15 each   Seated Hip ER with TB R/L Yellow 2x15 each   Hip Hikes R/L 4\" 2x10 each   Post - Lat Step Down 1D 4\" x10 each   CC Post Walk 40# x10   CC Lat Walk R/L 40# x10 each   Obstacle Course Fwd-Lat X3 each       SL Wall Abd 3D 2# X12 each       Standing ITB Stretch Reviewed for HEP   The Stick Distal ITB 3'             Home Exercise Program: Reviewed Gym HEp and added Hip Ad/Ad, Eccentric leg Ext and Press    Patient Education:    Reviewed gradual return to activity progressions and concepts. Discussed good vs bad soreness. Pt had all questions answered.     Assessment:  [x] Patient tolerated treatment well [] Patient limited by fatigue  [] Patient limited by pain  [] Patient limited by other medical complications  [] Other:     Prognosis: [x] Good [] Fair  [] Poor    Patient Requires Follow-up: [x] Yes  [] No    Plan:   [] Continue per plan of care [] Alter current plan (see comments)  [x] Plan of care initiated [] Hold pending MD visit [] Discharge    Electronically signed by:  Poncho Pelaez

## 2023-11-17 ENCOUNTER — HOSPITAL ENCOUNTER (OUTPATIENT)
Dept: PHYSICAL THERAPY | Age: 38
Setting detail: THERAPIES SERIES
Discharge: HOME | End: 2023-11-17
Payer: COMMERCIAL

## 2023-11-17 PROCEDURE — 9990000029 HC GAP PACKAGE

## 2023-11-17 NOTE — FLOWSHEET NOTE
[address] [place] [address], [address] [place] [address]  Phone: [phone]  Fax [phone]                                            Lower Extremity Daily Performance Training Note  Date:  2023    Patient Name:  Madelin Hoang  \"Lesa\"  :  1985  MRN: [id number]  Restrictions/Precautions:   Medical/Treatment Diagnosis Information:   Right TKR 2023    Basic ADLs - Walk for Distance  [facility]    Physician Information:  Morro Dockery - REINA      Visit Number:  paid 215 on 9-, 2023  Billed 215 on 9-, 2023    Subjective: Pt states that she did a mile and a half hike this past weekend with no pain. Very pleased with her progress. Objective:  Observation:       Exercises:  Exercise/Equipment 2023   Recumbent Bike 5'   HS Mobs 3D X5 each   Calf Hip Flex Mobs 3D X5 each   Lateral Walk with TB Yellow 2 laps       Bilat Squats 3D 5# X15   SL Squats 3D X10 each   Step matrix 3D X15 each   Seated Hip ER with TB R/L Yellow 2x15 each   Hip Hikes R/L 4\" 2x10 each   Post - Lat Step Down 1D 4\" x10 each   CC Post Walk 40# x10   CC Lat Walk R/L 40# x10 each   Obstacle Course Fwd-Lat X3 each       SL Wall Abd 3D 2# X12 each       Standing ITB Stretch Reviewed for HEP   The Stick Distal ITB 3'             Home Exercise Program: Reviewed Gym HEp and added Hip Ad/Ad, Eccentric leg Ext and Press    Patient Education:    Reviewed gradual return to activity progressions and concepts. Discussed good vs bad soreness. Pt had all questions answered.     Assessment:  [x] Patient tolerated treatment well [] Patient limited by fatigue  [] Patient limited by pain  [] Patient limited by other medical complications  [] Other:     Prognosis: [x] Good [] Fair  [] Poor    Patient Requires Follow-up: [x] Yes  [] No    Plan:   [] Continue per plan of care [] Alter current plan (see comments)  [x] Plan of care initiated [] Hold

## 2023-11-30 ENCOUNTER — HOSPITAL ENCOUNTER (OUTPATIENT)
Dept: PHYSICAL THERAPY | Age: 38
Discharge: HOME | End: 2023-11-30

## 2023-11-30 NOTE — FLOWSHEET NOTE
[address] [place] [address], [address] [place] [address]  Phone: [phone]  Fax [phone]                                            Lower Extremity Daily Performance Training Note  Date:  2023    Patient Name:  Jun Acosta  \"Lesa\"  :  1985  MRN: [id number]  Restrictions/Precautions:   Medical/Treatment Diagnosis Information:   Right TKR 2023    Basic ADLs - Walk for Distance  [facility]    Physician Information:  Brandi HUANG      Visit Number:  paid 215 on 9-, 2023  Billed 215 on 9-, 2023    Subjective: Pt states that she did a mile and a half hike this past weekend with no pain. Very pleased with her progress. Objective:  Observation:       Exercises:  Exercise/Equipment 2023   Recumbent Bike 5'   HS Mobs 3D X5 each   Calf Hip Flex Mobs 3D X5 each   Lateral Walk with TB Yellow 2 laps       Bilat Squats 3D 5# X15   SL Squats 3D X10 each   Step matrix 3D X15 each   Seated Hip ER with TB R/L Yellow 2x15 each   Hip Hikes R/L 4\" 2x10 each   Post - Lat Step Down 1D 4\" x10 each   CC Post Walk 40# x10   CC Lat Walk R/L 40# x10 each   Obstacle Course Fwd-Lat X3 each       SL Wall Abd 3D 2# X12 each       Standing ITB Stretch Reviewed for HEP   The Stick Distal ITB 3'             Home Exercise Program: Reviewed Gym HEp and added Hip Ad/Ad, Eccentric leg Ext and Press    Patient Education:    Reviewed gradual return to activity progressions and concepts. Discussed good vs bad soreness. Pt had all questions answered.     Assessment:  [x] Patient tolerated treatment well [] Patient limited by fatigue  [] Patient limited by pain  [] Patient limited by other medical complications  [] Other:     Prognosis: [x] Good [] Fair  [] Poor    Patient Requires Follow-up: [x] Yes  [] No    Plan:   [] Continue per plan of care [] Alter current plan (see comments)  [x] Plan of care initiated [] Hold

## 2023-12-08 ENCOUNTER — HOSPITAL ENCOUNTER (OUTPATIENT)
Dept: PHYSICAL THERAPY | Age: 38
Discharge: HOME | End: 2023-12-08

## 2023-12-08 NOTE — FLOWSHEET NOTE
[address] [place] [address], [address] [place] [address]  Phone: [phone]  Fax [phone]                                            Lower Extremity Daily Performance Training Note  Date:  2023    Patient Name:  Maru Mauro  \"Lesa\"  :  1985  MRN: [id number]  Restrictions/Precautions:   Medical/Treatment Diagnosis Information:   Right TKR 2023    Basic ADLs - Walk for Distance  [facility]    Physician Information:  Mathieu HUANG      Visit Number:  paid 215 on 9-, 2023  Billed 215 on 9-, 2023    Subjective: Pt states that she did a mile and a half hike this past weekend with no pain. Very pleased with her progress. Objective:  Observation:       Exercises:  Exercise/Equipment 2023   Recumbent Bike 5'   HS Mobs 3D X5 each   Calf Hip Flex Mobs 3D X5 each   Lateral Walk with TB Red 2 laps       Bilat Squats 3D 10# X10   SL Squats 3D 3# X10 each   Step matrix 3D X15 each   Seated Hip ER with TB R/L Red 2x15 each   Hip Hikes R/L 4\" 2x10 each   Post - Lat Step Down 1D 4\" x10 each   CC Post Walk 40# x10   CC Lat Walk R/L 40# x10 each   Obstacle Course Fwd-Lat X3 each       SL Wall Abd 3D 2# X12 each       Standing ITB Stretch Reviewed for HEP   The Stick Distal ITB 3'             Home Exercise Program: Reviewed Gym HEp and added Hip Ad/Ad, Eccentric leg Ext and Press    Patient Education:    Reviewed gradual return to activity progressions and concepts. Discussed good vs bad soreness. Pt had all questions answered.     Assessment:  [x] Patient tolerated treatment well [] Patient limited by fatigue  [] Patient limited by pain  [] Patient limited by other medical complications  [] Other:     Prognosis: [x] Good [] Fair  [] Poor    Patient Requires Follow-up: [x] Yes  [] No    Plan:   [] Continue per plan of care [] Alter current plan (see comments)  [x] Plan of care initiated [] Hold

## 2023-12-11 RX ORDER — LOSARTAN POTASSIUM 50 MG/1
50 TABLET ORAL DAILY
Qty: 30 TABLET | Refills: 5 | Status: SHIPPED | OUTPATIENT
Start: 2023-12-11

## 2023-12-15 ENCOUNTER — HOSPITAL ENCOUNTER (OUTPATIENT)
Dept: PHYSICAL THERAPY | Age: 38
Discharge: HOME | End: 2023-12-15

## 2023-12-15 NOTE — FLOWSHEET NOTE
[address] [place] [address], [address] [place] [address]  [address] [place] [address], [address] [place] [address], [address] [place] [address]  Phone: [phone]  Fax [phone]                                            Lower Extremity Daily Performance Training Note  Date:  12/15/2023    Patient Name:  Ritu Salomon  \"Lesa\"  :  1985  MRN: [id number]  Restrictions/Precautions:   Medical/Treatment Diagnosis Information:   Right TKR 2023    Basic ADLs - Walk for Distance  [facility]    Physician Information:  Tran HUANG      Visit Number: 10/12 paid 215 on 9-, 2023  Billed 215 on 9-, 2023    Subjective: Pt states that she did a lot of baking last night and stood for 4 hours and her R knee is a little more sore today. Objective:  Observation:       Exercises:  Exercise/Equipment 12-   Recumbent Bike 5'   HS Mobs 3D X5 each   Calf Hip Flex Mobs 3D X5 each   Lateral Walk with TB Red 2 laps       Bilat Squats 3D 10# X10   SL Squats 3D 3# X10 each (Hold Today)   Step matrix 3D X15 each   Seated Hip ER with TB R/L Red 2x20 each   Hip Hikes R/L 4\" 2x15 each   Post - Lat Step Down 1D 4\" x10 each (Hold Today)   CC Post Walk 40# x10   CC Lat Walk R/L 40# x10 each       SL Wall Abd 3D 2# X12 each       Standing ITB Stretch Reviewed for HEP   The Stick Distal ITB 3'             Home Exercise Program: Reviewed Gym HEp and added Hip Ad/Ad, Eccentric leg Ext and Press    Patient Education:    Reviewed gradual return to activity progressions and concepts. Discussed good vs bad soreness. Pt had all questions answered.     Assessment:  [x] Patient tolerated treatment well [] Patient limited by fatigue  [] Patient limited by pain  [] Patient limited by other medical complications  [] Other:     Prognosis: [x] Good [] Fair  [] Poor    Patient Requires Follow-up: [x] Yes  [] No    Plan:   [] Continue per plan of care [] Alter current plan (see comments)  [x] Plan of care initiated [] Hold

## 2024-01-05 ENCOUNTER — HOSPITAL ENCOUNTER (OUTPATIENT)
Dept: PHYSICAL THERAPY | Age: 39
Discharge: HOME | End: 2024-01-05

## 2024-01-05 NOTE — FLOWSHEET NOTE
[facility], [facility]  [address] [place] [address], [place]  [address]  Phone: [phone]  Fax [phone]                                            Lower Extremity Daily Performance Training Note  Date:  2024    Patient Name:  Roxy Duran  \"Lesa\"  :  1985  MRN: [id number]  Restrictions/Precautions:   Medical/Treatment Diagnosis Information:   Right TKR 2023    Basic ADLs - Walk for Distance  [facility]    Physician Information:  Clara HUANG      Visit Number:  paid 215 on 9-, 2023  Billed 215 on 9-, 2023    Subjective: Pt states that she is feeling really good today, reports having some crepitus as she flexes her knee. Denies any soreness or discomfort with this.    Objective:  Observation:       Exercises:  Exercise/Equipment 2024       ([facility])   Recumbent Bike 5'   HS Mobs 3D X5 each   Calf Hip Flex Mobs 3D X5 each   Lateral Walk with TB Red 2 laps       Bilat Squats 3D 10# X10   SL Squats 3D 3# X10 each    Step matrix 3D X15 each   Seated Hip ER with TB R/L Red 2x20 each   Hip Hikes R/L 4\" 2x15 each   Post - Lat Step Down 1D 4\" x10 each (Hold Today)   CC Post Walk 45# x10   CC Lat Walk R/L 45# x10 each       SL Wall Abd 3D 2# X12 each       Standing ITB Stretch Reviewed for HEP   The Stick Distal ITB 3'             Home Exercise Program: Reviewed Gym HEp and added Hip Ad/Ad, Eccentric leg Ext and Press    Patient Education:    Reviewed gradual return to activity progressions and concepts. Discussed good vs bad soreness. Pt had all questions answered.    Assessment:  [x] Patient tolerated treatment well [] Patient limited by fatigue  [] Patient limited by pain  [] Patient limited by other medical complications  [] Other:     Prognosis: [x] Good [] Fair  [] Poor    Patient Requires Follow-up: [x] Yes  [] No    Plan:   [] Continue per plan of care [] Alter current plan (see comments)  [x] Plan

## 2024-01-09 RX ORDER — CHLORTHALIDONE 25 MG/1
25 TABLET ORAL DAILY
Qty: 30 TABLET | Refills: 2 | Status: SHIPPED | OUTPATIENT
Start: 2024-01-09

## 2024-03-08 ENCOUNTER — HOSPITAL ENCOUNTER (OUTPATIENT)
Dept: MAMMOGRAPHY | Age: 39
Discharge: HOME | End: 2024-03-08
Payer: COMMERCIAL

## 2024-03-08 VITALS — HEIGHT: 64 IN | BODY MASS INDEX: 50.02 KG/M2 | WEIGHT: 293 LBS

## 2024-03-08 DIAGNOSIS — Z12.31 VISIT FOR SCREENING MAMMOGRAM: ICD-10-CM

## 2024-03-08 PROCEDURE — 77063 BREAST TOMOSYNTHESIS BI: CPT

## 2024-04-15 ENCOUNTER — OFFICE VISIT (OUTPATIENT)
Dept: GYNECOLOGY | Age: 39
End: 2024-04-15
Payer: COMMERCIAL

## 2024-04-15 VITALS
BODY MASS INDEX: 50.02 KG/M2 | DIASTOLIC BLOOD PRESSURE: 80 MMHG | WEIGHT: 293 LBS | OXYGEN SATURATION: 97 % | SYSTOLIC BLOOD PRESSURE: 120 MMHG | HEART RATE: 90 BPM | HEIGHT: 64 IN | RESPIRATION RATE: 17 BRPM

## 2024-04-15 DIAGNOSIS — Z01.419 WELL WOMAN EXAM WITH ROUTINE GYNECOLOGICAL EXAM: Primary | ICD-10-CM

## 2024-04-15 PROCEDURE — 99386 PREV VISIT NEW AGE 40-64: CPT | Performed by: OBSTETRICS & GYNECOLOGY

## 2024-04-15 PROCEDURE — 3079F DIAST BP 80-89 MM HG: CPT | Performed by: OBSTETRICS & GYNECOLOGY

## 2024-04-15 PROCEDURE — 3074F SYST BP LT 130 MM HG: CPT | Performed by: OBSTETRICS & GYNECOLOGY

## 2024-04-15 RX ORDER — CHLORTHALIDONE 25 MG/1
25 TABLET ORAL DAILY
Qty: 30 TABLET | Refills: 2 | Status: SHIPPED | OUTPATIENT
Start: 2024-04-15

## 2024-04-15 ASSESSMENT — ENCOUNTER SYMPTOMS
ALLERGIC/IMMUNOLOGIC NEGATIVE: 1
EYES NEGATIVE: 1
GASTROINTESTINAL NEGATIVE: 1
RESPIRATORY NEGATIVE: 1

## 2024-04-15 NOTE — TELEPHONE ENCOUNTER
Medication:   Requested Prescriptions     Pending Prescriptions Disp Refills    chlorthalidone (HYGROTON) 25 MG tablet [Pharmacy Med Name: CHLORTHALIDONE 25 MG TABLET] 30 tablet 2     Sig: TAKE 1 TABLET BY MOUTH DAILY     Last Filled:  3/12/24    Last appt: 10/19/2023   Next appt: 4/22/2024

## 2024-04-16 NOTE — PROGRESS NOTES
Subjective:      Patient ID: Roxy Duran is a 39 y.o. female.    Patient is here for annual.         Review of Systems   Constitutional: Negative.    HENT: Negative.     Eyes: Negative.    Respiratory: Negative.     Cardiovascular: Negative.    Gastrointestinal: Negative.    Endocrine: Negative.    Genitourinary: Negative.    Musculoskeletal: Negative.    Skin: Negative.    Allergic/Immunologic: Negative.    Neurological: Negative.    Hematological: Negative.    Psychiatric/Behavioral: Negative.       Date of Birth 1985  Past Medical History:   Diagnosis Date    Hyperlipidemia     Hypertension     Impaired fasting glucose     Obesity     Osteoarthritis      Past Surgical History:   Procedure Laterality Date    JOINT REPLACEMENT  2023    Total Right Knee Replacement    KNEE ARTHROSCOPY Right 2020    KNEE SURGERY Bilateral     patella realignment-2 on right knee and one on left    OTHER SURGICAL HISTORY N/A 2018    EGD with biopsy    TOTAL KNEE ARTHROPLASTY Right 2023    RIGHT TOTAL KNEE ARTHROPLASTY            ANGEL BIOMET NOTE: ADDUCTOR CANAL BLOCK, IPAC performed by Carlitos Elizabeth MD at [facility] OR     OB History    Para Term  AB Living   0 0 0 0 0 0   SAB IAB Ectopic Molar Multiple Live Births   0 0 0 0 0 0     Social History     Socioeconomic History    Marital status: Single     Spouse name: Not on file    Number of children: Not on file    Years of education: Not on file    Highest education level: Not on file   Occupational History    Occupation: [employer]   Tobacco Use    Smoking status: Never    Smokeless tobacco: Never   Vaping Use    Vaping Use: Never used   Substance and Sexual Activity    Alcohol use: Yes     Alcohol/week: 2.0 standard drinks of alcohol     Types: 1 Glasses of wine, 1 Cans of beer per week     Comment: once a week    Drug use: No    Sexual activity: Yes     Partners: Male   Other Topics Concern    Not on file   Social

## 2024-04-19 ASSESSMENT — PATIENT HEALTH QUESTIONNAIRE - PHQ9
SUM OF ALL RESPONSES TO PHQ QUESTIONS 1-9: 0
SUM OF ALL RESPONSES TO PHQ9 QUESTIONS 1 & 2: 0
SUM OF ALL RESPONSES TO PHQ QUESTIONS 1-9: 0
1. LITTLE INTEREST OR PLEASURE IN DOING THINGS: NOT AT ALL
2. FEELING DOWN, DEPRESSED OR HOPELESS: NOT AT ALL
SUM OF ALL RESPONSES TO PHQ QUESTIONS 1-9: 0
SUM OF ALL RESPONSES TO PHQ QUESTIONS 1-9: 0

## 2024-04-24 ASSESSMENT — PATIENT HEALTH QUESTIONNAIRE - PHQ9
SUM OF ALL RESPONSES TO PHQ9 QUESTIONS 1 & 2: 0
1. LITTLE INTEREST OR PLEASURE IN DOING THINGS: NOT AT ALL
2. FEELING DOWN, DEPRESSED OR HOPELESS: NOT AT ALL

## 2024-05-17 ENCOUNTER — OFFICE VISIT (OUTPATIENT)
Dept: PRIMARY CARE CLINIC | Age: 39
End: 2024-05-17
Payer: COMMERCIAL

## 2024-05-17 VITALS
DIASTOLIC BLOOD PRESSURE: 78 MMHG | HEART RATE: 65 BPM | TEMPERATURE: 98.1 F | SYSTOLIC BLOOD PRESSURE: 127 MMHG | OXYGEN SATURATION: 96 % | WEIGHT: 293 LBS | BODY MASS INDEX: 54.14 KG/M2

## 2024-05-17 DIAGNOSIS — Z00.00 ROUTINE PHYSICAL EXAMINATION: Primary | ICD-10-CM

## 2024-05-17 DIAGNOSIS — R73.02 IMPAIRED GLUCOSE TOLERANCE: ICD-10-CM

## 2024-05-17 PROCEDURE — 3078F DIAST BP <80 MM HG: CPT | Performed by: FAMILY MEDICINE

## 2024-05-17 PROCEDURE — 3074F SYST BP LT 130 MM HG: CPT | Performed by: FAMILY MEDICINE

## 2024-05-17 PROCEDURE — 99396 PREV VISIT EST AGE 40-64: CPT | Performed by: FAMILY MEDICINE

## 2024-05-17 SDOH — ECONOMIC STABILITY: FOOD INSECURITY: WITHIN THE PAST 12 MONTHS, YOU WORRIED THAT YOUR FOOD WOULD RUN OUT BEFORE YOU GOT MONEY TO BUY MORE.: NEVER TRUE

## 2024-05-17 SDOH — ECONOMIC STABILITY: FOOD INSECURITY: WITHIN THE PAST 12 MONTHS, THE FOOD YOU BOUGHT JUST DIDN'T LAST AND YOU DIDN'T HAVE MONEY TO GET MORE.: NEVER TRUE

## 2024-05-17 SDOH — ECONOMIC STABILITY: INCOME INSECURITY: HOW HARD IS IT FOR YOU TO PAY FOR THE VERY BASICS LIKE FOOD, HOUSING, MEDICAL CARE, AND HEATING?: NOT HARD AT ALL

## 2024-05-17 ASSESSMENT — ENCOUNTER SYMPTOMS
NAUSEA: 0
VOMITING: 0
ABDOMINAL PAIN: 0
SHORTNESS OF BREATH: 0
DIARRHEA: 0

## 2024-05-17 NOTE — PROGRESS NOTES
Palpations: Abdomen is soft.      Tenderness: There is no abdominal tenderness.   Musculoskeletal:      Cervical back: Neck supple.      Right lower leg: No edema.      Left lower leg: No edema.   Lymphadenopathy:      Cervical: No cervical adenopathy.   Skin:     General: Skin is warm and dry.      Capillary Refill: Capillary refill takes less than 2 seconds.   Neurological:      General: No focal deficit present.      Mental Status: She is alert. Mental status is at baseline.   Psychiatric:         Mood and Affect: Mood normal.         Assessment:  Encounter Diagnoses   Name Primary?    Routine physical examination Yes    Impaired glucose tolerance        Plan:    - Fasting labs. UTD on HM.     New Prescriptions    No medications on file        Orders Placed This Encounter   Procedures    CBC with Auto Differential    Comprehensive Metabolic Panel    Lipid, Fasting    Hemoglobin A1C        Return in about 6 months (around 11/17/2024) for Routine follow up.         Lisette Marcum, DO

## 2024-06-05 ENCOUNTER — HOSPITAL ENCOUNTER (OUTPATIENT)
Dept: PHYSICAL THERAPY | Age: 39
Setting detail: THERAPIES SERIES
Discharge: HOME | End: 2024-06-05

## 2024-06-05 DIAGNOSIS — M79.671 RIGHT FOOT PAIN: Primary | ICD-10-CM

## 2024-06-10 ENCOUNTER — HOSPITAL ENCOUNTER (OUTPATIENT)
Dept: PHYSICAL THERAPY | Age: 39
Setting detail: THERAPIES SERIES
Discharge: HOME | End: 2024-06-10
Payer: COMMERCIAL

## 2024-06-10 PROCEDURE — 97140 MANUAL THERAPY 1/> REGIONS: CPT

## 2024-06-10 PROCEDURE — 97110 THERAPEUTIC EXERCISES: CPT

## 2024-06-10 NOTE — FLOWSHEET NOTE
Documentation:  I certify that this patient meets the below criteria necessary for medical necessity for care and/or justification of therapy services:  The patient has functional impairments and/or activity limitations and would benefit from continued outpatient therapy services to address the deficits outlined in the patients goals    Return to Play: NA    Prognosis for POC: [x] Good [] Fair  [] Poor    Patient requires continued skilled intervention: [x] Yes  [] No      CHARGE CAPTURE     PT CHARGE GRID   CPT Code (TIMED) minutes # CPT Code (UNTIMED) #     Therex (57425)  25 2  EVAL:LOW (22027 - Typically 20 minutes face-to-face)     Neuromusc. Re-ed (79558)    Re-Eval (76372)     Manual (46021) 15 1  Estim Unattended (79828)     Ther. Act (38743)    Mech. Traction (20864)     Gait (52649)    Dry Needle 1-2 muscle (20560)     Aquatic Therex (03685)    Dry Needle 3+ muscle (20561)     Iontophoresis (13647)    VASO (86283)     Ultrasound (06187)    Group Therapy (21090)     Estim Attended (57659)    Canalith Repositioning (39302)     Other:    Other:    Total Timed Code Tx Minutes 40 3       Total Treatment Minutes 45          Charge Justification:  (73459) THERAPEUTIC EXERCISE - Provided verbal/tactile cueing for activities related to strengthening, flexibility, endurance, ROM performed to prevent loss of range of motion, maintain or improve muscular strength or increase flexibility, following either an injury or surgery.   (06987) HOME EXERCISE PROGRAM - Reviewed/Progressed HEP activities related to strengthening, flexibility, endurance, ROM performed to prevent loss of range of motion, maintain or improve muscular strength or increase flexibility, following either an injury or surgery.  (86208) MANUAL THERAPY -  Manual therapy techniques, 1 or more regions, each 15 minutes (Mobilization/manipulation, manual lymphatic drainage, manual traction) for the purpose of modulating pain, promoting relaxation,  increasing

## 2024-06-12 RX ORDER — LOSARTAN POTASSIUM 50 MG/1
50 TABLET ORAL DAILY
Qty: 30 TABLET | Refills: 5 | Status: SHIPPED | OUTPATIENT
Start: 2024-06-12

## 2024-06-19 ENCOUNTER — HOSPITAL ENCOUNTER (OUTPATIENT)
Dept: PHYSICAL THERAPY | Age: 39
Setting detail: THERAPIES SERIES
Discharge: HOME | End: 2024-06-19
Payer: COMMERCIAL

## 2024-06-19 PROCEDURE — 20560 NDL INSJ W/O NJX 1 OR 2 MUSC: CPT

## 2024-06-19 PROCEDURE — 97110 THERAPEUTIC EXERCISES: CPT

## 2024-06-19 PROCEDURE — 97140 MANUAL THERAPY 1/> REGIONS: CPT

## 2024-06-19 NOTE — FLOWSHEET NOTE
[facility]- [facility]patient [facility] and Therapy [address], [address], [place] [address] office: [phone] fax: [phone]     Physical Therapy Initial Evaluation Certification      Dear Yoandy Zuñiga MD,    We had the pleasure of evaluating the following patient for physical therapy services at [facility].  A summary of our findings can be found in the initial assessment below.  This includes our plan of care.  If you have any questions or concerns regarding these findings, please do not hesitate to contact me at the office phone number listed above.  Thank you for the referral.     Physician Signature:_______________________________Date:__________________  By signing above (or electronic signature), therapist’s plan is approved by physician       Physical Therapy: TREATMENT/PROGRESS NOTE   Patient: Roxy Duran (39 y.o. female)   Examination Date: 2024   :  1985 MRN: [id number]   Visit #: 3   Insurance Allowable Auth Needed   Yes []Yes    [x]No    Insurance: Payor: [employer] / Plan: [facility] SHARED SERVICES / Product Type: *No Product type* /   Insurance ID: [id number] - (Commercial)  Secondary Insurance (if applicable):    Treatment Diagnosis: R foot pain M79.671   Medical Diagnosis:  Right foot pain [M79.671]  Plantar fasciitis of right foot [M72.2]   Referring Physician: Yoandy Zuñiga MD  PCP: Lisette Marcum DO       SUBJECTIVE EXAMINATION     Patient stated complaint: Pt states her foot has been feeling better since the start of PT, but there are still good days and bad days. Pt states her foot still bothers her at work, but is about the same since starting PT.      Test used Initial score  6/10/24 2024   Pain Summary VAS 10    Functional questionnaire FAAM 25%     Other:              Pain:  Pain location: R foot   Patient describes pain to be intermittent, Sharp, and aching  Pain decreases with: Ice  Pain

## 2024-06-26 ENCOUNTER — HOSPITAL ENCOUNTER (OUTPATIENT)
Dept: PHYSICAL THERAPY | Age: 39
Setting detail: THERAPIES SERIES
Discharge: HOME | End: 2024-06-26
Payer: COMMERCIAL

## 2024-06-26 DIAGNOSIS — R73.02 IMPAIRED GLUCOSE TOLERANCE: ICD-10-CM

## 2024-06-26 DIAGNOSIS — Z00.00 ROUTINE PHYSICAL EXAMINATION: ICD-10-CM

## 2024-06-26 LAB
ALBUMIN SERPL-MCNC: 4.6 G/DL (ref 3.4–5)
ALBUMIN/GLOB SERPL: 1.6 {RATIO} (ref 1.1–2.2)
ALP SERPL-CCNC: 79 U/L (ref 40–129)
ALT SERPL-CCNC: 20 U/L (ref 10–40)
ANION GAP SERPL CALCULATED.3IONS-SCNC: 15 MMOL/L (ref 3–16)
AST SERPL-CCNC: 18 U/L (ref 15–37)
BASOPHILS # BLD: 0 K/UL (ref 0–0.2)
BASOPHILS NFR BLD: 0.5 %
BILIRUB SERPL-MCNC: 0.6 MG/DL (ref 0–1)
BUN SERPL-MCNC: 20 MG/DL (ref 7–20)
CALCIUM SERPL-MCNC: 9.6 MG/DL (ref 8.3–10.6)
CHLORIDE SERPL-SCNC: 102 MMOL/L (ref 99–110)
CHOLEST SERPL-MCNC: 195 MG/DL (ref 0–199)
CO2 SERPL-SCNC: 25 MMOL/L (ref 21–32)
CREAT SERPL-MCNC: 0.8 MG/DL (ref 0.6–1.1)
DEPRECATED RDW RBC AUTO: 14.7 % (ref 12.4–15.4)
EOSINOPHIL # BLD: 0.1 K/UL (ref 0–0.6)
EOSINOPHIL NFR BLD: 1 %
GFR SERPLBLD CREATININE-BSD FMLA CKD-EPI: >90 ML/MIN/{1.73_M2}
GLUCOSE SERPL-MCNC: 121 MG/DL (ref 70–99)
HCT VFR BLD AUTO: 40.1 % (ref 36–48)
HDLC SERPL-MCNC: 38 MG/DL (ref 40–60)
HGB BLD-MCNC: 13.1 G/DL (ref 12–16)
LDL CHOLESTEROL: ABNORMAL MG/DL
LDLC SERPL-MCNC: 110 MG/DL
LYMPHOCYTES # BLD: 2.1 K/UL (ref 1–5.1)
LYMPHOCYTES NFR BLD: 23.3 %
MCH RBC QN AUTO: 26.9 PG (ref 26–34)
MCHC RBC AUTO-ENTMCNC: 32.7 G/DL (ref 31–36)
MCV RBC AUTO: 82.2 FL (ref 80–100)
MONOCYTES # BLD: 0.7 K/UL (ref 0–1.3)
MONOCYTES NFR BLD: 8.2 %
NEUTROPHILS # BLD: 6 K/UL (ref 1.7–7.7)
NEUTROPHILS NFR BLD: 67 %
PLATELET # BLD AUTO: 376 K/UL (ref 135–450)
PMV BLD AUTO: 7.8 FL (ref 5–10.5)
POTASSIUM SERPL-SCNC: 3.9 MMOL/L (ref 3.5–5.1)
PROT SERPL-MCNC: 7.4 G/DL (ref 6.4–8.2)
RBC # BLD AUTO: 4.89 M/UL (ref 4–5.2)
SODIUM SERPL-SCNC: 142 MMOL/L (ref 136–145)
TRIGL SERPL-MCNC: 332 MG/DL (ref 0–150)
VLDLC SERPL CALC-MCNC: ABNORMAL MG/DL
WBC # BLD AUTO: 8.9 K/UL (ref 4–11)

## 2024-06-26 PROCEDURE — 97110 THERAPEUTIC EXERCISES: CPT

## 2024-06-26 PROCEDURE — 97140 MANUAL THERAPY 1/> REGIONS: CPT

## 2024-06-26 NOTE — FLOWSHEET NOTE
assessment of functional outcome.        Medical Necessity Documentation:  I certify that this patient meets the below criteria necessary for medical necessity for care and/or justification of therapy services:  The patient has functional impairments and/or activity limitations and would benefit from continued outpatient therapy services to address the deficits outlined in the patients goals    Return to Play: NA    Prognosis for POC: [x] Good [] Fair  [] Poor    Patient requires continued skilled intervention: [x] Yes  [] No      CHARGE CAPTURE     PT CHARGE GRID   CPT Code (TIMED) minutes # CPT Code (UNTIMED) #     Therex (43295)  27 2  EVAL:LOW (20644 - Typically 20 minutes face-to-face)     Neuromusc. Re-ed (48780)    Re-Eval (86638)     Manual (35466) 13 1  Estim Unattended (41365)     Ther. Act (00376)    Mech. Traction (48185)     Gait (25933)    Dry Needle 1-2 muscle (20560)     Aquatic Therex (83228)    Dry Needle 3+ muscle (20561)     Iontophoresis (00102)    VASO (39979)     Ultrasound (13146)    Group Therapy (53665)     Estim Attended (57901)    Canalith Repositioning (63554)     Other:    Other:    Total Timed Code Tx Minutes 40 3       Total Treatment Minutes 42          Charge Justification:  (44162) THERAPEUTIC EXERCISE - Provided verbal/tactile cueing for activities related to strengthening, flexibility, endurance, ROM performed to prevent loss of range of motion, maintain or improve muscular strength or increase flexibility, following either an injury or surgery.   (28198) HOME EXERCISE PROGRAM - Reviewed/Progressed HEP activities related to strengthening, flexibility, endurance, ROM performed to prevent loss of range of motion, maintain or improve muscular strength or increase flexibility, following either an injury or surgery.  (53276) MANUAL THERAPY -  Manual therapy techniques, 1 or more regions, each 15 minutes (Mobilization/manipulation, manual lymphatic drainage, manual traction) for the

## 2024-06-27 LAB
EST. AVERAGE GLUCOSE BLD GHB EST-MCNC: 125.5 MG/DL
HBA1C MFR BLD: 6 %

## 2024-07-02 NOTE — RESULT ENCOUNTER NOTE
Labs are stable overall. Hopefully with your continued weight loss efforts we can keep these numbers in check for you without medication. We can chat more at your next visit. Let us know if you have any questions. - Dr. Marcum

## 2024-07-03 ENCOUNTER — HOSPITAL ENCOUNTER (OUTPATIENT)
Dept: PHYSICAL THERAPY | Age: 39
Setting detail: THERAPIES SERIES
Discharge: HOME | End: 2024-07-03
Payer: COMMERCIAL

## 2024-07-03 PROCEDURE — 97140 MANUAL THERAPY 1/> REGIONS: CPT

## 2024-07-03 PROCEDURE — 97110 THERAPEUTIC EXERCISES: CPT

## 2024-07-03 NOTE — PLAN OF CARE
[facility]- Outpatient Rehabilitation and Therapy [address], [place] [address] office: [phone] fax: [phone]     Physical Therapy Re-Certification Plan of Care    Dear Yoandy Zuñiga MD  ,    We had the pleasure of treating the following patient for physical therapy services at [facility]. A summary of our findings can be found in the updated assessment below.  This includes our plan of care.  If you have any questions or concerns regarding these findings, please do not hesitate to contact me at the office phone number checked above.  Thank you for the referral.     Physician Signature:________________________________Date:__________________  By signing above (or electronic signature), therapist's plan is approved by physician      Functional Outcome[facility] 1985 continues to present with functional deficits in ROM and endurance of strength  limiting ability with walking on even ground, walking on uneven ground, walking up/down stairs, and ADLs .  During therapy this date, patient required visual cueing, verbal cueing, and muscle facilitation for exercise progression, improving proper muscle recruitment and activation/motor control patterns, modulating pain, increasing ROM, reduce/eliminate soft tissue swelling/inflammation/restriction, and improving soft tissue extensibility. Patient will continue to benefit from ongoing evaluation and advanced clinical decision from a Physical Therapist to improve pain control, ROM, muscle strength, neuromuscular control, normalization of gait, and balance and proprioception to safely return to [facility] without symptoms or restrictions.    Overall Response to Treatment:  Patient is responding well to treatment and improvement is noted with regards to goals    Total Visits: 5     Recommendation:    [] Continue PT  / wk for   [x] Hold PT, pending MD visit   [] Discharge to [facility]. Follow up with

## 2024-07-15 RX ORDER — CHLORTHALIDONE 25 MG/1
25 TABLET ORAL DAILY
Qty: 30 TABLET | Refills: 2 | Status: SHIPPED | OUTPATIENT
Start: 2024-07-15

## 2024-07-25 ENCOUNTER — HOSPITAL ENCOUNTER (OUTPATIENT)
Dept: PHYSICAL THERAPY | Age: 39
Setting detail: THERAPIES SERIES
Discharge: HOME | End: 2024-07-25
Payer: COMMERCIAL

## 2024-07-25 PROCEDURE — 20560 NDL INSJ W/O NJX 1 OR 2 MUSC: CPT | Performed by: PHYSICAL THERAPIST

## 2024-07-25 PROCEDURE — 97110 THERAPEUTIC EXERCISES: CPT

## 2024-07-25 PROCEDURE — 97140 MANUAL THERAPY 1/> REGIONS: CPT

## 2024-07-25 NOTE — FLOWSHEET NOTE
[facility]- Outpatient Rehabilitation and Therapy [address], [place] [address] office: [phone] fax: [phone]           Physical Therapy: TREATMENT/PROGRESS NOTE   Patient: Roxy Duran (39 y.o. female)   Examination Date: 2024   :  1985 MRN: [id number]   Visit #: 6   Insurance Allowable Auth Needed   Yes []Yes    [x]No    Insurance: Payor: [employer] / Plan: [facility] SHARED SERVICES / Product Type: *No Product type* /   Insurance ID: [id number] - (Commercial)  Secondary Insurance (if applicable):    Treatment Diagnosis: R foot pain M79.671   Medical Diagnosis:  Right foot pain [M79.671]  Plantar fasciitis of right foot [M72.2]   Referring Physician: Yoandy Zuñiga MD  PCP: Lisette Marcum,        SUBJECTIVE EXAMINATION     Patient stated complaint: Pt returns to Pt today after a visit to her doctor. Pt reports that her foot is feeling  \"pretty bad today. Pt reports that she had a follow up with her doctor, and he said that he would like her to do two more weeks of PT and then they would try oral steroids, or a cortisone injection.      Test used Initial score  6/10/24 7/3/2024   Pain Summary VAS 4/10 4/10   Functional questionnaire FAAM 25% disability 25% disability   Other:              Pain:  Pain location: R foot   Patient describes pain to be intermittent, Sharp, and aching  Pain decreases with: Ice  Pain increases with: Activity and Movement, Standing, and Prolonged standing     Relevant Medical History: Previous knee replacements       OBJECTIVE EXAMINATION     6/10/24  ROM/Strength: (Blank cells denote NT)      Mvmt (norm) AROM L AROM R Notes PROM L PROM R Notes     LUMBAR Flex (90)         Ext (25)         SB (25)          Rotation (30)             HIP Flex (120)          Abd (45)          ER (50)          IR (45)          Ext (20)         KNEE Flex (140)          Ext (0)           ANKLE DF (20)  15        PF (50)  40

## 2024-08-01 ENCOUNTER — HOSPITAL ENCOUNTER (OUTPATIENT)
Dept: PHYSICAL THERAPY | Age: 39
Setting detail: THERAPIES SERIES
Discharge: HOME | End: 2024-08-01
Payer: COMMERCIAL

## 2024-08-01 PROCEDURE — 97110 THERAPEUTIC EXERCISES: CPT

## 2024-08-01 PROCEDURE — 97140 MANUAL THERAPY 1/> REGIONS: CPT

## 2024-08-01 NOTE — FLOWSHEET NOTE
(48888) as indicated to include: Passive Range of Motion, Gr I-IV mobilizations, and Dry Needling/IASTM  Modalities as needed that may include: Cryotherapy and Vasoneumatic Compression    Plan: POC initiated as per evaluation    Electronically Signed by ALEXANDRA FUNG, PT  Date: 08/01/2024     Note: Portions of this note have been templated and/or copied from initial evaluation, reassessments and prior notes for documentation efficiency.    Note: If patient does not return for scheduled/recommended follow up visits, this note will serve as a discharge from care along with the most recent update on progress.

## 2024-08-08 ENCOUNTER — HOSPITAL ENCOUNTER (OUTPATIENT)
Dept: PHYSICAL THERAPY | Age: 39
Setting detail: THERAPIES SERIES
Discharge: HOME | End: 2024-08-08
Payer: COMMERCIAL

## 2024-08-08 PROCEDURE — 97140 MANUAL THERAPY 1/> REGIONS: CPT

## 2024-08-08 PROCEDURE — 97110 THERAPEUTIC EXERCISES: CPT

## 2024-08-08 NOTE — FLOWSHEET NOTE
[facility]- Outpatient Rehabilitation and Therapy [address], [place] [address] office: [phone] fax: [phone]           Physical Therapy: TREATMENT/PROGRESS NOTE   Patient: Roxy Duran (39 y.o. female)   Examination Date: 2024   :  1985 MRN: [id number]   Visit #: 8   Insurance Allowable Auth Needed   Yes []Yes    [x]No    Insurance: Payor: [employer] / Plan: [facility] SHARED SERVICES / Product Type: *No Product type* /   Insurance ID: [id number] - (Commercial)  Secondary Insurance (if applicable):    Treatment Diagnosis: R foot pain M79.671   Medical Diagnosis:  Right foot pain [M79.671]  Plantar fasciitis of right foot [M72.2]   Referring Physician: Yoandy Zuñiga MD  PCP: Lisette Marcum,        SUBJECTIVE EXAMINATION     Patient stated complaint: Pt reports that she continues to feel better overall, and states that her foot feels better throughout the day. Pt also reports that she only needs to stretch her foot in the morning right beofre getting up out of bed.      Test used Initial score  6/10/24 7/3/2024   Pain Summary VAS 4/10 4/10   Functional questionnaire FAAM 25% disability 25% disability   Other:              Pain:  Pain location: R foot   Patient describes pain to be intermittent, Sharp, and aching  Pain decreases with: Ice  Pain increases with: Activity and Movement, Standing, and Prolonged standing     Relevant Medical History: Previous knee replacements       OBJECTIVE EXAMINATION     6/10/24  ROM/Strength: (Blank cells denote NT)      Mvmt (norm) AROM L AROM R Notes PROM L PROM R Notes     LUMBAR Flex (90)         Ext (25)         SB (25)          Rotation (30)             HIP Flex (120)          Abd (45)          ER (50)          IR (45)          Ext (20)         KNEE Flex (140)          Ext (0)           ANKLE DF (20)  15        PF (50)  40        Inversion (30)  25        Eversion (20)  15           MMT L          MMT

## 2024-10-05 NOTE — ANESTHESIA POSTPROCEDURE EVALUATION
[facility], 4th [facility]  [address] [facility] [address]  Dept: [phone]      Alexandro Perla :1967 MRN:[id number]      2024 Time Session Began: 1300 Time Session Ended: 1345    In Person: This visit was performed in person. Consent to Treatment form was reviewed, discussed, and signed with patient.    Session Type:Therapy 38-52 minutes (10867)    Others Present: No    Intervention: Cognitive Behavioral, Insight, Solution focused, Supportive, Dialectical Behavioral    Suicide/Homicide/Violence Ideation: No    If Yes, explain: n/a    Current Outpatient Medications   Medication Sig    lithium (LITHOBID) 300 MG CR tablet Take 1 tablet by mouth daily (with dinner).    albuterol 108 (90 Base) MCG/ACT inhaler Inhale 2 puffs into the lungs Every 4 hours as needed for Shortness of Breath or Wheezing.    sucralfate (CARAFATE) 1 g tablet Take 1 tablet by mouth in the morning and 1 tablet in the evening. Take before meals.    pantoprazole (PROTONIX) 40 MG tablet Take 1 tablet by mouth daily (before breakfast).     No current facility-administered medications for this visit.       Change in Medication(s) Reported: No  If Yes, explain: n/a    Patient/Family Education Provided: Yes  Patient/Family Displays Understanding: Yes    If No, explain: n/a    Chief complaint in patient's own words: “I just wish I had known all of that before I got into all of my past relationships.\"    Progress Note containing chief complaint and symptoms/problems related to the complaint:     (Data/Action/Response/Plan)    D: Pt reported that he had been doing “well” and denied having any specific MH symptoms or concerns he wished to discuss. Pt informed writer that he had been extremely busy during the summer as he had gotten plenty of work opportunities. Pt also reported that he had decided to move from one sister's residence to another sister's residence, which he stated has been working out “a lot better”. Pt spent time  Department of Anesthesiology  Postprocedure Note    Patient: Walker Libman  MRN: [id number]  YOB: 1985  Date of evaluation: 2/7/2023      Procedure Summary     Date: 02/07/23 Room / Location: [address] [place] [address] / [facility]    Anesthesia Start: 1653 Anesthesia Stop: 1939    Procedure: RIGHT TOTAL KNEE ARTHROPLASTY            ANGEL BIOMET NOTE: ADDUCTOR CANAL BLOCK, IPAC (Right: Knee) Diagnosis:       Primary osteoarthritis of right knee      (RIGHT KNEE PRIMARY OSTEOARTHRITIS)    Surgeons: Adiel Lopez MD Responsible Provider: Bhanu Garcia MD    Anesthesia Type: general ASA Status: 3          Anesthesia Type: No value filed.     Alfredo Phase I: Alfredo Score: 7    Alfredo Phase II:        Anesthesia Post Evaluation    Comments: Postoperative Anesthesia Note    Name:    Walker Libman  MRN:      [id number]    Patient Vitals in the past 12 hrs:  02/07/23 1947, Pulse:83, SpO2:97 %  02/07/23 1946, Pulse:80, SpO2:95 %  02/07/23 1945, Pulse:80, SpO2:(!) 85 %  02/07/23 1944, Pulse:79, SpO2:92 %  02/07/23 1943, BP:137/86, Pulse:77, SpO2:91 %  02/07/23 1942, Pulse:89, Resp:14, SpO2:(!) 61 %  02/07/23 1941, Pulse:79, Resp:10, SpO2:95 %  02/07/23 1940, Pulse:86, Resp:13, SpO2:93 %  02/07/23 1939, BP:(!) 143/91, Pulse:86, Resp:16, SpO2:90 %  02/07/23 1938, SpO2:(!) 86 %  02/07/23 1337, BP:132/85, Temp:98 °F (36.7 °C), Temp src:Temporal, Pulse:72, Resp:16, SpO2:96 %, Height:5' 3.75\" (1.619 m), Weight:(!) 317 lb (143.8 kg)     LABS:    CBC  Lab Results       Component                Value               Date/Time                  WBC                      7.8                 01/18/2023 08:46 AM        HGB                      13.0                01/18/2023 08:46 AM        HCT                      39.5                01/18/2023 08:46 AM        PLT                      320                 01/18/2023 08:46 AM   RENAL  Lab Results       Component                Value               Date/Time NA                       136                 01/18/2023 08:46 AM        K                        3.7                 01/18/2023 08:46 AM        CL                       100                 01/18/2023 08:46 AM        CO2                      21                  01/18/2023 08:46 AM        BUN                      14                  01/18/2023 08:46 AM        CREATININE               0.7                 01/18/2023 08:46 AM        GLUCOSE                  125 (H)             01/18/2023 08:46 AM   COAGS  Lab Results       Component                Value               Date/Time                  PROTIME                  13.5                01/18/2023 08:46 AM        INR                      1.04                01/18/2023 08:46 AM        APTT                     32.4                01/18/2023 08:46 AM     Intake & Output:  @56XSOB@    Nausea & Vomiting:  No    Level of Consciousness:  Awake    Pain Assessment:  Adequate analgesia    Anesthesia Complications:  No apparent anesthetic complications    SUMMARY      Vital signs stable  OK to discharge from Stage I post anesthesia care.   Care transferred from Anesthesiology department on discharge from perioperative area discussing what he has learned about nutrition, and the positive impact it has had on his physical health, mental health, and overall wellbeing. Pt also expressed wanting to learn more about sleep. Pt also spent time discussing the various things he has learned about relationships, stating that he wishes he had learned these things before getting into various past relationships. Pt also reported that he has been wanting to explore the possibility of obtaining disability, however stated that he had previously thought that being prescribed psychotropic medication was sufficient criteria to obtain disability.    A: Writer provided reflective listening, support and validation throughout the session. Writer assessed pt's presenting concerns and explored changes in pt's mood and behaviors. Writer assisted pt in processing his feelings re: the issues discussed. Writer utilized interventions of cognitive behavioral, insight, solution-focused, supportive, and dialectical behavioral.     R: Pt was actively engaged throughout session and was receptive to feedback from writer. Pt was alert and oriented with stable mood and congruent affect. Strategies to cope with identified stressors were brainstormed and discussed. Pt explored alternative strategies to resolve the presenting concerns.     P: Pt will continue to work on treatment goals as stated in treatment plan.    Need for Community Resources Assessed: Yes    Resources Needed: No    If Yes, what resources: n/a    Primary Diagnosis: Adjustment Disorder w/Anxiety  : N/A    Treatment Plan: Unchanged    Discharge Plan: N/A    Next Appointment: Writer to inform pt if an appointment in becomes available  Mya Aguilar LPC

## 2024-10-21 RX ORDER — CHLORTHALIDONE 25 MG/1
25 TABLET ORAL DAILY
Qty: 90 TABLET | Refills: 1 | Status: SHIPPED | OUTPATIENT
Start: 2024-10-21

## 2024-11-20 ENCOUNTER — OFFICE VISIT (OUTPATIENT)
Dept: PRIMARY CARE CLINIC | Age: 39
End: 2024-11-20

## 2024-11-20 VITALS
WEIGHT: 293 LBS | HEART RATE: 94 BPM | SYSTOLIC BLOOD PRESSURE: 132 MMHG | DIASTOLIC BLOOD PRESSURE: 79 MMHG | OXYGEN SATURATION: 96 % | BODY MASS INDEX: 52.63 KG/M2

## 2024-11-20 DIAGNOSIS — R73.02 IMPAIRED GLUCOSE TOLERANCE: ICD-10-CM

## 2024-11-20 DIAGNOSIS — E66.01 OBESITY, MORBID, BMI 50 OR HIGHER: ICD-10-CM

## 2024-11-20 DIAGNOSIS — L65.9 HAIR THINNING: Primary | ICD-10-CM

## 2024-11-20 DIAGNOSIS — Z23 FLU VACCINE NEED: ICD-10-CM

## 2024-11-20 DIAGNOSIS — L65.9 HAIR THINNING: ICD-10-CM

## 2024-11-20 PROBLEM — M25.561 CHRONIC PAIN OF RIGHT KNEE: Status: RESOLVED | Noted: 2020-03-11 | Resolved: 2024-11-20

## 2024-11-20 PROBLEM — G89.29 CHRONIC PAIN OF RIGHT KNEE: Status: RESOLVED | Noted: 2020-03-11 | Resolved: 2024-11-20

## 2024-11-20 LAB
25(OH)D3 SERPL-MCNC: 22.4 NG/ML
FERRITIN SERPL IA-MCNC: 147 NG/ML (ref 15–150)
HBA1C MFR BLD: 5.4 %
T4 FREE SERPL-MCNC: 1.3 NG/DL (ref 0.9–1.8)
TSH SERPL DL<=0.005 MIU/L-ACNC: 1 UIU/ML (ref 0.27–4.2)

## 2024-11-20 ASSESSMENT — ENCOUNTER SYMPTOMS: COUGH: 0

## 2024-11-20 NOTE — PROGRESS NOTES
[facility]  [facility]  [address] [place] [id number]  Dept: [phone]  Dept Fax: [phone]     11/20/2024      Roxy M Renee   1985     Chief Complaint   Patient presents with    Follow-up       HPI    Pt comes in today for follow up HTN/IGT.     On semglutide from online provider. Limited success thus far but is making slow progress. ~ 15 lb weight loss. Dealing with side effects of nausea/GERD. Knee is doing very well s/p arthroplasty.     Wt Readings from Last 5 Encounters:   11/20/24 (!) 139.1 kg (306 lb 9.6 oz)   05/17/24 (!) 143.1 kg (315 lb 6.4 oz)   04/15/24 (!) 145.3 kg (320 lb 6.4 oz)   03/08/24 (!) 140.2 kg (309 lb)   10/19/23 (!) 144.3 kg (318 lb 3.2 oz)      BP Readings from Last 5 Encounters:   11/20/24 132/79   05/17/24 127/78   04/15/24 120/80   10/19/23 115/75   04/19/23 130/88         Patient states she has been experiencing significant hair loss over the last 6-7 months. Generalized hair thinning.     No data recorded     Prior to Visit Medications    Medication Sig Taking? Authorizing Provider   chlorthalidone (HYGROTON) 25 MG tablet TAKE 1 TABLET BY MOUTH DAILY  Lisette Marcum DO   losartan (COZAAR) 50 MG tablet TAKE 1 TABLET BY MOUTH DAILY  Lisette Marcum DO   SEMAGLUTIDE,0.25 OR 0.5MG/DOS, SC Inject into the skin  Provider, MD Micheline        Past Medical History:   Diagnosis Date    Hyperlipidemia     Hypertension     Impaired fasting glucose     Obesity     Osteoarthritis         Social History     Tobacco Use    Smoking status: Never    Smokeless tobacco: Never   Vaping Use    Vaping status: Never Used   Substance Use Topics    Alcohol use: Yes     Alcohol/week: 2.0 standard drinks of alcohol     Types: 1 Glasses of wine, 1 Cans of beer per week     Comment: once a week    Drug use: No        Past Surgical History:   Procedure Laterality Date    JOINT REPLACEMENT  2/7/2023    Total Right Knee

## 2024-11-21 LAB — THYROPEROXIDASE AB SERPL IA-ACNC: 13 IU/ML

## 2024-11-21 NOTE — RESULT ENCOUNTER NOTE
Thyroid panel was normal. Iron level normal. Your vitamin D is still low so I would suggest adding 2000 units of Vit D3 plus the collagen supplement we discussed for the hair loss. - Dr. Marcum

## 2024-12-18 RX ORDER — LOSARTAN POTASSIUM 50 MG/1
50 TABLET ORAL DAILY
Qty: 30 TABLET | Refills: 0 | Status: SHIPPED | OUTPATIENT
Start: 2024-12-18

## 2024-12-18 NOTE — TELEPHONE ENCOUNTER
Medication:   Requested Prescriptions     Pending Prescriptions Disp Refills    losartan (COZAAR) 50 MG tablet [Pharmacy Med Name: LOSARTAN POTASSIUM 50 MG TAB] 30 tablet 5     Sig: TAKE 1 TABLET BY MOUTH DAILY     Last Filled:  n/a    Last appt: 11/20/2024   Next appt: 5/20/2025

## 2025-01-14 RX ORDER — LOSARTAN POTASSIUM 50 MG/1
50 TABLET ORAL DAILY
Qty: 90 TABLET | Refills: 1 | Status: SHIPPED | OUTPATIENT
Start: 2025-01-14

## 2025-01-14 NOTE — TELEPHONE ENCOUNTER
Medication:   Requested Prescriptions     Pending Prescriptions Disp Refills    losartan (COZAAR) 50 MG tablet 90 tablet 0     Sig: Take 1 tablet by mouth daily     Last Filled:  n/a    Last appt: 11/20/2024   Next appt: 5/20/2025

## 2025-04-11 ENCOUNTER — HOSPITAL ENCOUNTER (OUTPATIENT)
Dept: MAMMOGRAPHY | Age: 40
Discharge: HOME | End: 2025-04-11
Payer: COMMERCIAL

## 2025-04-11 VITALS — HEIGHT: 64 IN | BODY MASS INDEX: 50.02 KG/M2 | WEIGHT: 293 LBS

## 2025-04-11 DIAGNOSIS — Z12.31 VISIT FOR SCREENING MAMMOGRAM: ICD-10-CM

## 2025-04-11 PROCEDURE — 77063 BREAST TOMOSYNTHESIS BI: CPT

## 2025-04-21 ENCOUNTER — OFFICE VISIT (OUTPATIENT)
Dept: GYNECOLOGY | Age: 40
End: 2025-04-21
Payer: COMMERCIAL

## 2025-04-21 VITALS
OXYGEN SATURATION: 96 % | WEIGHT: 293 LBS | BODY MASS INDEX: 50.02 KG/M2 | HEART RATE: 98 BPM | SYSTOLIC BLOOD PRESSURE: 116 MMHG | HEIGHT: 64 IN | DIASTOLIC BLOOD PRESSURE: 72 MMHG

## 2025-04-21 DIAGNOSIS — N95.1 PERIMENOPAUSE: ICD-10-CM

## 2025-04-21 DIAGNOSIS — L65.9 HAIR LOSS: ICD-10-CM

## 2025-04-21 DIAGNOSIS — N92.6 IRREGULAR MENSTRUAL BLEEDING: ICD-10-CM

## 2025-04-21 DIAGNOSIS — Z01.419 WELL WOMAN EXAM WITH ROUTINE GYNECOLOGICAL EXAM: Primary | ICD-10-CM

## 2025-04-21 LAB
ESTRADIOL SERPL-MCNC: 91 PG/ML
FSH SERPL-ACNC: 4 MIU/ML

## 2025-04-21 PROCEDURE — G8417 CALC BMI ABV UP PARAM F/U: HCPCS | Performed by: OBSTETRICS & GYNECOLOGY

## 2025-04-21 PROCEDURE — 99459 PELVIC EXAMINATION: CPT | Performed by: OBSTETRICS & GYNECOLOGY

## 2025-04-21 PROCEDURE — 99213 OFFICE O/P EST LOW 20 MIN: CPT | Performed by: OBSTETRICS & GYNECOLOGY

## 2025-04-21 PROCEDURE — G8427 DOCREV CUR MEDS BY ELIG CLIN: HCPCS | Performed by: OBSTETRICS & GYNECOLOGY

## 2025-04-21 PROCEDURE — 99396 PREV VISIT EST AGE 40-64: CPT | Performed by: OBSTETRICS & GYNECOLOGY

## 2025-04-21 PROCEDURE — 3074F SYST BP LT 130 MM HG: CPT | Performed by: OBSTETRICS & GYNECOLOGY

## 2025-04-21 PROCEDURE — 1036F TOBACCO NON-USER: CPT | Performed by: OBSTETRICS & GYNECOLOGY

## 2025-04-21 PROCEDURE — 3078F DIAST BP <80 MM HG: CPT | Performed by: OBSTETRICS & GYNECOLOGY

## 2025-04-21 ASSESSMENT — ENCOUNTER SYMPTOMS
RESPIRATORY NEGATIVE: 1
GASTROINTESTINAL NEGATIVE: 1
ALLERGIC/IMMUNOLOGIC NEGATIVE: 1
EYES NEGATIVE: 1

## 2025-04-21 ASSESSMENT — PATIENT HEALTH QUESTIONNAIRE - PHQ9
SUM OF ALL RESPONSES TO PHQ QUESTIONS 1-9: 0
2. FEELING DOWN, DEPRESSED OR HOPELESS: NOT AT ALL
1. LITTLE INTEREST OR PLEASURE IN DOING THINGS: NOT AT ALL
SUM OF ALL RESPONSES TO PHQ QUESTIONS 1-9: 0

## 2025-04-21 NOTE — PROGRESS NOTES
Subjective   Patient ID: Roxy Duran is a 40 y.o. female.    Patient is here for annual. Patient feeling like she is starting to go through perimenopause. Patient skipping some cycles. Feeling mood changes. Having hair loss as well.    Gynecologic Exam        Review of Systems   Constitutional: Negative.    HENT: Negative.     Eyes: Negative.    Respiratory: Negative.     Cardiovascular: Negative.    Gastrointestinal: Negative.    Endocrine: Negative.    Genitourinary:  Positive for menstrual problem.   Musculoskeletal: Negative.    Skin: Negative.    Allergic/Immunologic: Negative.    Neurological: Negative.    Hematological: Negative.    Psychiatric/Behavioral: Negative.       Date of Birth 1985  Past Medical History:   Diagnosis Date    Hyperlipidemia     Hypertension     Impaired fasting glucose     Obesity     Osteoarthritis      Past Surgical History:   Procedure Laterality Date    JOINT REPLACEMENT  2023    Total Right Knee Replacement    KNEE ARTHROSCOPY Right 2020    KNEE SURGERY Bilateral     patella realignment-2 on right knee and one on left    OTHER SURGICAL HISTORY N/A 2018    EGD with biopsy    TOTAL KNEE ARTHROPLASTY Right 2023    RIGHT TOTAL KNEE ARTHROPLASTY            ANGEL BIOMET NOTE: ADDUCTOR CANAL BLOCK, IPAC performed by Carlitos Elizabeth MD at [facility] OR     OB History    Para Term  AB Living   0 0 0 0 0 0   SAB IAB Ectopic Molar Multiple Live Births   0 0 0 0 0 0     Social History     Socioeconomic History    Marital status: Single     Spouse name: Not on file    Number of children: Not on file    Years of education: Not on file    Highest education level: Not on file   Occupational History    Occupation: [employer]   Tobacco Use    Smoking status: Never    Smokeless tobacco: Never   Vaping Use    Vaping status: Never Used   Substance and Sexual Activity    Alcohol use: Yes     Alcohol/week: 2.0 standard drinks of alcohol

## 2025-04-23 ENCOUNTER — RESULTS FOLLOW-UP (OUTPATIENT)
Dept: GYNECOLOGY | Age: 40
End: 2025-04-23

## 2025-04-23 LAB
DHEA-S SERPL-MCNC: 173 UG/DL (ref 60.9–337)
INSULIN COMMENT: NORMAL
INSULIN REFERENCE RANGE:: NORMAL
INSULIN SERPL-ACNC: 27.4 MU/L
TESTOST SERPL-MCNC: 21 NG/DL (ref 8–48)

## 2025-04-28 RX ORDER — CHLORTHALIDONE 25 MG/1
25 TABLET ORAL DAILY
Qty: 90 TABLET | Refills: 1 | Status: SHIPPED | OUTPATIENT
Start: 2025-04-28

## 2025-05-08 DIAGNOSIS — N92.6 IRREGULAR MENSTRUAL BLEEDING: Primary | ICD-10-CM

## 2025-05-12 ENCOUNTER — HOSPITAL ENCOUNTER (OUTPATIENT)
Dept: ULTRASOUND IMAGING | Age: 40
Discharge: HOME | End: 2025-05-12
Attending: OBSTETRICS & GYNECOLOGY
Payer: COMMERCIAL

## 2025-05-12 DIAGNOSIS — N92.6 IRREGULAR MENSTRUAL BLEEDING: ICD-10-CM

## 2025-05-12 PROCEDURE — 76856 US EXAM PELVIC COMPLETE: CPT

## 2025-05-20 ENCOUNTER — OFFICE VISIT (OUTPATIENT)
Dept: PRIMARY CARE CLINIC | Age: 40
End: 2025-05-20
Payer: COMMERCIAL

## 2025-05-20 VITALS — OXYGEN SATURATION: 97 % | HEART RATE: 81 BPM | SYSTOLIC BLOOD PRESSURE: 125 MMHG | DIASTOLIC BLOOD PRESSURE: 78 MMHG

## 2025-05-20 DIAGNOSIS — Z00.00 ROUTINE PHYSICAL EXAMINATION: Primary | ICD-10-CM

## 2025-05-20 DIAGNOSIS — M17.12 PRIMARY OSTEOARTHRITIS OF LEFT KNEE: ICD-10-CM

## 2025-05-20 DIAGNOSIS — E55.9 VITAMIN D DEFICIENCY: ICD-10-CM

## 2025-05-20 DIAGNOSIS — I10 ESSENTIAL HYPERTENSION: ICD-10-CM

## 2025-05-20 DIAGNOSIS — R73.02 IMPAIRED GLUCOSE TOLERANCE: ICD-10-CM

## 2025-05-20 DIAGNOSIS — L65.9 HAIR THINNING: ICD-10-CM

## 2025-05-20 DIAGNOSIS — Z00.00 ROUTINE PHYSICAL EXAMINATION: ICD-10-CM

## 2025-05-20 LAB
25(OH)D3 SERPL-MCNC: 52.5 NG/ML
ALBUMIN SERPL-MCNC: 4.3 G/DL (ref 3.4–5)
ALBUMIN/GLOB SERPL: 1.7 {RATIO} (ref 1.1–2.2)
ALP SERPL-CCNC: 54 U/L (ref 40–129)
ALT SERPL-CCNC: 13 U/L (ref 10–40)
ANION GAP SERPL CALCULATED.3IONS-SCNC: 11 MMOL/L (ref 3–16)
AST SERPL-CCNC: 15 U/L (ref 15–37)
BASOPHILS # BLD: 0.1 K/UL (ref 0–0.2)
BASOPHILS NFR BLD: 1 %
BILIRUB SERPL-MCNC: <0.2 MG/DL (ref 0–1)
BUN SERPL-MCNC: 14 MG/DL (ref 7–20)
CALCIUM SERPL-MCNC: 9.4 MG/DL (ref 8.3–10.6)
CHLORIDE SERPL-SCNC: 100 MMOL/L (ref 99–110)
CHOLEST SERPL-MCNC: 168 MG/DL (ref 0–199)
CO2 SERPL-SCNC: 27 MMOL/L (ref 21–32)
CREAT SERPL-MCNC: 0.8 MG/DL (ref 0.6–1.1)
DEPRECATED RDW RBC AUTO: 14.2 % (ref 12.4–15.4)
EOSINOPHIL # BLD: 0.1 K/UL (ref 0–0.6)
EOSINOPHIL NFR BLD: 1.4 %
EST. AVERAGE GLUCOSE BLD GHB EST-MCNC: 114 MG/DL
GFR SERPLBLD CREATININE-BSD FMLA CKD-EPI: >90 ML/MIN/{1.73_M2}
GLUCOSE SERPL-MCNC: 109 MG/DL (ref 70–99)
HBA1C MFR BLD: 5.6 %
HCT VFR BLD AUTO: 39 % (ref 36–48)
HDLC SERPL-MCNC: 39 MG/DL (ref 40–60)
HGB BLD-MCNC: 13.2 G/DL (ref 12–16)
LDL CHOLESTEROL: 87 MG/DL
LYMPHOCYTES # BLD: 1.9 K/UL (ref 1–5.1)
LYMPHOCYTES NFR BLD: 22 %
MCH RBC QN AUTO: 27.6 PG (ref 26–34)
MCHC RBC AUTO-ENTMCNC: 33.8 G/DL (ref 31–36)
MCV RBC AUTO: 81.7 FL (ref 80–100)
MONOCYTES # BLD: 0.6 K/UL (ref 0–1.3)
MONOCYTES NFR BLD: 7.1 %
NEUTROPHILS # BLD: 5.8 K/UL (ref 1.7–7.7)
NEUTROPHILS NFR BLD: 68.5 %
PLATELET # BLD AUTO: 365 K/UL (ref 135–450)
PMV BLD AUTO: 8.3 FL (ref 5–10.5)
POTASSIUM SERPL-SCNC: 3.9 MMOL/L (ref 3.5–5.1)
PROT SERPL-MCNC: 6.9 G/DL (ref 6.4–8.2)
RBC # BLD AUTO: 4.77 M/UL (ref 4–5.2)
SODIUM SERPL-SCNC: 138 MMOL/L (ref 136–145)
TRIGL SERPL-MCNC: 209 MG/DL (ref 0–150)
VLDLC SERPL CALC-MCNC: 42 MG/DL
WBC # BLD AUTO: 8.5 K/UL (ref 4–11)

## 2025-05-20 PROCEDURE — 99396 PREV VISIT EST AGE 40-64: CPT | Performed by: FAMILY MEDICINE

## 2025-05-20 PROCEDURE — 3078F DIAST BP <80 MM HG: CPT | Performed by: FAMILY MEDICINE

## 2025-05-20 PROCEDURE — 3074F SYST BP LT 130 MM HG: CPT | Performed by: FAMILY MEDICINE

## 2025-05-20 SDOH — ECONOMIC STABILITY: FOOD INSECURITY: WITHIN THE PAST 12 MONTHS, YOU WORRIED THAT YOUR FOOD WOULD RUN OUT BEFORE YOU GOT MONEY TO BUY MORE.: NEVER TRUE

## 2025-05-20 SDOH — ECONOMIC STABILITY: FOOD INSECURITY: WITHIN THE PAST 12 MONTHS, THE FOOD YOU BOUGHT JUST DIDN'T LAST AND YOU DIDN'T HAVE MONEY TO GET MORE.: NEVER TRUE

## 2025-05-20 ASSESSMENT — ENCOUNTER SYMPTOMS
SHORTNESS OF BREATH: 0
DIARRHEA: 0
NAUSEA: 0
ABDOMINAL PAIN: 0
VOMITING: 0

## 2025-05-20 NOTE — PROGRESS NOTES
Provider, MD Micheline   norethindrone-ethinyl estradiol-Fe (LO LOESTRIN FE) 1 MG-10 MCG / 10 MCG tablet Take 1 tablet by mouth daily  Anna Parish MD   losartan (COZAAR) 50 MG tablet Take 1 tablet by mouth daily  Lisette Marcum DO        Past Medical History:   Diagnosis Date    Hyperlipidemia     Hypertension     Impaired fasting glucose     Obesity     Osteoarthritis         Social History     Tobacco Use    Smoking status: Never    Smokeless tobacco: Never   Vaping Use    Vaping status: Never Used   Substance Use Topics    Alcohol use: Yes     Alcohol/week: 2.0 standard drinks of alcohol     Types: 1 Glasses of wine, 1 Cans of beer per week     Comment: once a week    Drug use: No        Past Surgical History:   Procedure Laterality Date    JOINT REPLACEMENT  2/7/2023    Total Right Knee Replacement    KNEE ARTHROSCOPY Right 05/2020    KNEE SURGERY Bilateral     patella realignment-2 on right knee and one on left    OTHER SURGICAL HISTORY N/A 03/30/2018    EGD with biopsy    TOTAL KNEE ARTHROPLASTY Right 02/07/2023    RIGHT TOTAL KNEE ARTHROPLASTY            ANGEL BIOMET NOTE: ADDUCTOR CANAL BLOCK, IPAC performed by Carlitos Elizabeth MD at [facility] OR        No Known Allergies     Family History   Problem Relation Age of Onset    High Blood Pressure Mother     Other Mother         migraine headaches    Arthritis Mother     Cancer Father         lymphoma    Other Father     High Blood Pressure Sister     Other Sister         migraine headaches    Arthritis Maternal Grandmother     Arthritis Maternal Grandfather     Cancer Paternal Grandmother         esophageal    Arthritis Paternal Grandmother     Arthritis Paternal Grandfather         Patient's past medical history, surgical history, family history, medications, and allergies  were all reviewed and updated as appropriate today.    Review of Systems   Constitutional:  Negative for chills, fever and unexpected weight change.   Respiratory:

## 2025-05-21 ENCOUNTER — RESULTS FOLLOW-UP (OUTPATIENT)
Dept: PRIMARY CARE CLINIC | Age: 40
End: 2025-05-21

## 2025-05-21 LAB — ANA SER QL IA: NEGATIVE

## 2025-05-21 ASSESSMENT — ANXIETY QUESTIONNAIRES
GAD7 TOTAL SCORE: 6
6. BECOMING EASILY ANNOYED OR IRRITABLE: NEARLY EVERY DAY
5. BEING SO RESTLESS THAT IT IS HARD TO SIT STILL: NOT AT ALL
2. NOT BEING ABLE TO STOP OR CONTROL WORRYING: NOT AT ALL
6. BECOMING EASILY ANNOYED OR IRRITABLE: NEARLY EVERY DAY
7. FEELING AFRAID AS IF SOMETHING AWFUL MIGHT HAPPEN: NOT AT ALL
3. WORRYING TOO MUCH ABOUT DIFFERENT THINGS: NOT AT ALL
4. TROUBLE RELAXING: MORE THAN HALF THE DAYS
4. TROUBLE RELAXING: MORE THAN HALF THE DAYS
3. WORRYING TOO MUCH ABOUT DIFFERENT THINGS: NOT AT ALL
1. FEELING NERVOUS, ANXIOUS, OR ON EDGE: SEVERAL DAYS
5. BEING SO RESTLESS THAT IT IS HARD TO SIT STILL: NOT AT ALL
IF YOU CHECKED OFF ANY PROBLEMS ON THIS QUESTIONNAIRE, HOW DIFFICULT HAVE THESE PROBLEMS MADE IT FOR YOU TO DO YOUR WORK, TAKE CARE OF THINGS AT HOME, OR GET ALONG WITH OTHER PEOPLE: SOMEWHAT DIFFICULT
7. FEELING AFRAID AS IF SOMETHING AWFUL MIGHT HAPPEN: NOT AT ALL
2. NOT BEING ABLE TO STOP OR CONTROL WORRYING: NOT AT ALL
1. FEELING NERVOUS, ANXIOUS, OR ON EDGE: SEVERAL DAYS
IF YOU CHECKED OFF ANY PROBLEMS ON THIS QUESTIONNAIRE, HOW DIFFICULT HAVE THESE PROBLEMS MADE IT FOR YOU TO DO YOUR WORK, TAKE CARE OF THINGS AT HOME, OR GET ALONG WITH OTHER PEOPLE: SOMEWHAT DIFFICULT

## 2025-05-21 ASSESSMENT — PATIENT HEALTH QUESTIONNAIRE - PHQ9
3. TROUBLE FALLING OR STAYING ASLEEP: NOT AT ALL
2. FEELING DOWN, DEPRESSED OR HOPELESS: SEVERAL DAYS
SUM OF ALL RESPONSES TO PHQ QUESTIONS 1-9: 4
6. FEELING BAD ABOUT YOURSELF - OR THAT YOU ARE A FAILURE OR HAVE LET YOURSELF OR YOUR FAMILY DOWN: MORE THAN HALF THE DAYS
10. IF YOU CHECKED OFF ANY PROBLEMS, HOW DIFFICULT HAVE THESE PROBLEMS MADE IT FOR YOU TO DO YOUR WORK, TAKE CARE OF THINGS AT HOME, OR GET ALONG WITH OTHER PEOPLE: SOMEWHAT DIFFICULT
SUM OF ALL RESPONSES TO PHQ QUESTIONS 1-9: 4
10. IF YOU CHECKED OFF ANY PROBLEMS, HOW DIFFICULT HAVE THESE PROBLEMS MADE IT FOR YOU TO DO YOUR WORK, TAKE CARE OF THINGS AT HOME, OR GET ALONG WITH OTHER PEOPLE: SOMEWHAT DIFFICULT
1. LITTLE INTEREST OR PLEASURE IN DOING THINGS: SEVERAL DAYS
9. THOUGHTS THAT YOU WOULD BE BETTER OFF DEAD, OR OF HURTING YOURSELF: NOT AT ALL
1. LITTLE INTEREST OR PLEASURE IN DOING THINGS: SEVERAL DAYS
5. POOR APPETITE OR OVEREATING: NOT AT ALL
8. MOVING OR SPEAKING SO SLOWLY THAT OTHER PEOPLE COULD HAVE NOTICED. OR THE OPPOSITE, BEING SO FIGETY OR RESTLESS THAT YOU HAVE BEEN MOVING AROUND A LOT MORE THAN USUAL: NOT AT ALL
3. TROUBLE FALLING OR STAYING ASLEEP: NOT AT ALL
SUM OF ALL RESPONSES TO PHQ QUESTIONS 1-9: 4
7. TROUBLE CONCENTRATING ON THINGS, SUCH AS READING THE NEWSPAPER OR WATCHING TELEVISION: NOT AT ALL
2. FEELING DOWN, DEPRESSED OR HOPELESS: SEVERAL DAYS
SUM OF ALL RESPONSES TO PHQ QUESTIONS 1-9: 4
6. FEELING BAD ABOUT YOURSELF - OR THAT YOU ARE A FAILURE OR HAVE LET YOURSELF OR YOUR FAMILY DOWN: MORE THAN HALF THE DAYS
5. POOR APPETITE OR OVEREATING: NOT AT ALL
4. FEELING TIRED OR HAVING LITTLE ENERGY: NOT AT ALL
SUM OF ALL RESPONSES TO PHQ QUESTIONS 1-9: 4
4. FEELING TIRED OR HAVING LITTLE ENERGY: NOT AT ALL
8. MOVING OR SPEAKING SO SLOWLY THAT OTHER PEOPLE COULD HAVE NOTICED. OR THE OPPOSITE - BEING SO FIDGETY OR RESTLESS THAT YOU HAVE BEEN MOVING AROUND A LOT MORE THAN USUAL: NOT AT ALL
7. TROUBLE CONCENTRATING ON THINGS, SUCH AS READING THE NEWSPAPER OR WATCHING TELEVISION: NOT AT ALL
9. THOUGHTS THAT YOU WOULD BE BETTER OFF DEAD, OR OF HURTING YOURSELF: NOT AT ALL

## 2025-05-21 ASSESSMENT — LIFESTYLE VARIABLES
PAST THREE MONTHS WHAT IS THE LARGEST AMOUNT OF ALCOHOLIC DRINKS YOU HAVE CONSUMED IN ONE DAY: 5
ALCOHOL_DAYS_PER_WEEK: 1
HAVE YOU EVER RECEIVED ALCOHOL OR OTHER DRUG ABUSE TREATMENT: NO
HISTORY_ALCOHOL_USE: NO

## 2025-05-21 NOTE — RESULT ENCOUNTER NOTE
Your recent test results are all normal. Please don't hesitate to contact the office with any additional questions/concerns - Dr. Marcum

## 2025-05-22 ENCOUNTER — TELEMEDICINE (OUTPATIENT)
Age: 40
End: 2025-05-22
Payer: COMMERCIAL

## 2025-05-22 DIAGNOSIS — F43.23 ADJUSTMENT REACTION WITH ANXIETY AND DEPRESSION: Primary | ICD-10-CM

## 2025-05-22 PROCEDURE — 90791 PSYCH DIAGNOSTIC EVALUATION: CPT | Performed by: PSYCHOLOGIST

## 2025-05-22 NOTE — PROGRESS NOTES
PM 4/21/2025     8:30 AM 4/19/2024    12:26 PM   PHQ Scores   PHQ2 Score 2  0 0    0   PHQ9 Score 4  0 0    0       Patient-reported   Interpretation of Total Score Depression Severity: 1-4 = Minimal depression, 5-9 = Mild depression, 10-14 = Moderate depression, 15-19 = Moderately severe depression, 20-27 = Severe depression    Diagnosis:  1. Adjustment reaction with anxiety and depression       Plan:  Interventions  Established rapport, provided support and validation. Assessed history and recent symptoms for diagnostic clarification.  Engaged in goal-setting and prioritizing of patient's primary goals for [facility] visit / overall health. Provided psychoeducation regarding diagnostic impressions. Will continue to r/o additional contributing factors to recent mood changes.   Provided psychoeducation re: panic attacks, discussed personal triggers for panic attacks, identifying onset of panic symptoms quickly, strategies to reduce severity and frequency of panic attacks. Handout on Coping with Panic Attacks sent via [employer].     Behavioral Change Plan  See above.  Follow up with Dr. Mars in 2 weeks.  Reviewed methods of contacting provider for non-emergent issues between visits.  Will fluidly adjust follow up interval in response to severity of symptoms, impact on functioning, and patient's goals.      Return in 2 weeks (on 6/5/2025).  Collaboration occurred with PCP.          Roxy Duran, was evaluated through a synchronous (real-time) audio-video encounter. The patient (or guardian if applicable) is aware that this is a billable service, which includes applicable co-pays. This Virtual Visit was conducted with patient's (and/or legal guardian's) consent. Patient identification was verified, and a caregiver was present when appropriate.   The patient was located at Home: [address] [place] [id number]  Provider was located at Other: [place], [place]  Confirm you are appropriately licensed, registered,

## 2025-06-02 ASSESSMENT — ANXIETY QUESTIONNAIRES
1. FEELING NERVOUS, ANXIOUS, OR ON EDGE: MORE THAN HALF THE DAYS
6. BECOMING EASILY ANNOYED OR IRRITABLE: MORE THAN HALF THE DAYS
5. BEING SO RESTLESS THAT IT IS HARD TO SIT STILL: SEVERAL DAYS
7. FEELING AFRAID AS IF SOMETHING AWFUL MIGHT HAPPEN: NOT AT ALL
3. WORRYING TOO MUCH ABOUT DIFFERENT THINGS: SEVERAL DAYS
2. NOT BEING ABLE TO STOP OR CONTROL WORRYING: SEVERAL DAYS
2. NOT BEING ABLE TO STOP OR CONTROL WORRYING: SEVERAL DAYS
3. WORRYING TOO MUCH ABOUT DIFFERENT THINGS: SEVERAL DAYS
4. TROUBLE RELAXING: MORE THAN HALF THE DAYS
5. BEING SO RESTLESS THAT IT IS HARD TO SIT STILL: SEVERAL DAYS
1. FEELING NERVOUS, ANXIOUS, OR ON EDGE: MORE THAN HALF THE DAYS
IF YOU CHECKED OFF ANY PROBLEMS ON THIS QUESTIONNAIRE, HOW DIFFICULT HAVE THESE PROBLEMS MADE IT FOR YOU TO DO YOUR WORK, TAKE CARE OF THINGS AT HOME, OR GET ALONG WITH OTHER PEOPLE: SOMEWHAT DIFFICULT
IF YOU CHECKED OFF ANY PROBLEMS ON THIS QUESTIONNAIRE, HOW DIFFICULT HAVE THESE PROBLEMS MADE IT FOR YOU TO DO YOUR WORK, TAKE CARE OF THINGS AT HOME, OR GET ALONG WITH OTHER PEOPLE: SOMEWHAT DIFFICULT
6. BECOMING EASILY ANNOYED OR IRRITABLE: MORE THAN HALF THE DAYS
GAD7 TOTAL SCORE: 9
4. TROUBLE RELAXING: MORE THAN HALF THE DAYS
7. FEELING AFRAID AS IF SOMETHING AWFUL MIGHT HAPPEN: NOT AT ALL

## 2025-06-02 ASSESSMENT — PATIENT HEALTH QUESTIONNAIRE - PHQ9
SUM OF ALL RESPONSES TO PHQ QUESTIONS 1-9: 7
5. POOR APPETITE OR OVEREATING: NOT AT ALL
9. THOUGHTS THAT YOU WOULD BE BETTER OFF DEAD, OR OF HURTING YOURSELF: NOT AT ALL
5. POOR APPETITE OR OVEREATING: NOT AT ALL
SUM OF ALL RESPONSES TO PHQ QUESTIONS 1-9: 7
10. IF YOU CHECKED OFF ANY PROBLEMS, HOW DIFFICULT HAVE THESE PROBLEMS MADE IT FOR YOU TO DO YOUR WORK, TAKE CARE OF THINGS AT HOME, OR GET ALONG WITH OTHER PEOPLE: SOMEWHAT DIFFICULT
6. FEELING BAD ABOUT YOURSELF - OR THAT YOU ARE A FAILURE OR HAVE LET YOURSELF OR YOUR FAMILY DOWN: MORE THAN HALF THE DAYS
8. MOVING OR SPEAKING SO SLOWLY THAT OTHER PEOPLE COULD HAVE NOTICED. OR THE OPPOSITE - BEING SO FIDGETY OR RESTLESS THAT YOU HAVE BEEN MOVING AROUND A LOT MORE THAN USUAL: NOT AT ALL
7. TROUBLE CONCENTRATING ON THINGS, SUCH AS READING THE NEWSPAPER OR WATCHING TELEVISION: MORE THAN HALF THE DAYS
2. FEELING DOWN, DEPRESSED OR HOPELESS: SEVERAL DAYS
7. TROUBLE CONCENTRATING ON THINGS, SUCH AS READING THE NEWSPAPER OR WATCHING TELEVISION: MORE THAN HALF THE DAYS
SUM OF ALL RESPONSES TO PHQ QUESTIONS 1-9: 7
1. LITTLE INTEREST OR PLEASURE IN DOING THINGS: SEVERAL DAYS
4. FEELING TIRED OR HAVING LITTLE ENERGY: NOT AT ALL
6. FEELING BAD ABOUT YOURSELF - OR THAT YOU ARE A FAILURE OR HAVE LET YOURSELF OR YOUR FAMILY DOWN: MORE THAN HALF THE DAYS
SUM OF ALL RESPONSES TO PHQ QUESTIONS 1-9: 7
8. MOVING OR SPEAKING SO SLOWLY THAT OTHER PEOPLE COULD HAVE NOTICED. OR THE OPPOSITE, BEING SO FIGETY OR RESTLESS THAT YOU HAVE BEEN MOVING AROUND A LOT MORE THAN USUAL: NOT AT ALL
4. FEELING TIRED OR HAVING LITTLE ENERGY: NOT AT ALL
3. TROUBLE FALLING OR STAYING ASLEEP: SEVERAL DAYS
2. FEELING DOWN, DEPRESSED OR HOPELESS: SEVERAL DAYS
SUM OF ALL RESPONSES TO PHQ QUESTIONS 1-9: 7
1. LITTLE INTEREST OR PLEASURE IN DOING THINGS: SEVERAL DAYS
3. TROUBLE FALLING OR STAYING ASLEEP: SEVERAL DAYS
9. THOUGHTS THAT YOU WOULD BE BETTER OFF DEAD, OR OF HURTING YOURSELF: NOT AT ALL
10. IF YOU CHECKED OFF ANY PROBLEMS, HOW DIFFICULT HAVE THESE PROBLEMS MADE IT FOR YOU TO DO YOUR WORK, TAKE CARE OF THINGS AT HOME, OR GET ALONG WITH OTHER PEOPLE: SOMEWHAT DIFFICULT

## 2025-06-05 ENCOUNTER — TELEMEDICINE (OUTPATIENT)
Age: 40
End: 2025-06-05
Payer: COMMERCIAL

## 2025-06-05 DIAGNOSIS — F43.23 ADJUSTMENT REACTION WITH ANXIETY AND DEPRESSION: Primary | ICD-10-CM

## 2025-06-05 PROCEDURE — 90832 PSYTX W PT 30 MINUTES: CPT | Performed by: PSYCHOLOGIST

## 2025-06-05 NOTE — PROGRESS NOTES
Behavioral Health Consultation  Niecy Mars, Ph.D.  Clinical Psychologist  6/5/2025  12:00 PM  Name: Roxy Duran  YOB: 1985  PCP: Lisette Marcum, DO     TELEHEALTH VISIT -- Audio/Visual  This is her second [facility] appointment.  Reason for Consult: Anxiety and Depression     S:  Roxy is a 40 y.o. female seen for [facility] follow up visit addressing Adjustment disorder with anxiety and depression. Describes recent mood as anxious at times, no panic attacks since last reported. Woke up 4am today, possible nervousness about today's appt (new experience), worry about multiple topics including wanting to feel prepared for today's appt. Discussed urge to over-prepare to manage anxiety.     Identified thoughts related to panic attacks and that exacerbate panic symptoms. Experiences great frustration that anxiety is happening, angry at herself for \"letting this happen,\" describes beliefs related to weakness. Explored ways to challenge negative automatic thoughts in a way that feels authentic to her - \"You are strong in so many ways. Everyone experiences things like this. Some are more honest about this than others\"... \"I'm going to figure out how to dominate this like everything else.\" Thinking about addressing symptoms with \"domination plan\" feels silly, anticipates this adding levity in a beneficial way.     She's been taking lo-loestrin ~1mo, no perceived benefit yet. Reports plans to continue for min 60 days, has some concern about potential risk.     O:  MSE:  Appearance: adequate hygiene  Attitude: cooperative and friendly  Consciousness: alert  Orientation: oriented to person, place, time, general circumstance  Memory: recent and remote memory intact  Attention/Concentration: intact during session  Psychomotor Activity: normal  Eye Contact: normal  Speech: normal rate and volume, well-articulated  Mood: anxious  Affect: anxious and tearful at times  Perception: within normal

## 2025-06-06 ENCOUNTER — RESULTS FOLLOW-UP (OUTPATIENT)
Dept: GYNECOLOGY | Age: 40
End: 2025-06-06

## 2025-06-27 ASSESSMENT — ANXIETY QUESTIONNAIRES
4. TROUBLE RELAXING: MORE THAN HALF THE DAYS
5. BEING SO RESTLESS THAT IT IS HARD TO SIT STILL: NOT AT ALL
6. BECOMING EASILY ANNOYED OR IRRITABLE: MORE THAN HALF THE DAYS
2. NOT BEING ABLE TO STOP OR CONTROL WORRYING: SEVERAL DAYS
IF YOU CHECKED OFF ANY PROBLEMS ON THIS QUESTIONNAIRE, HOW DIFFICULT HAVE THESE PROBLEMS MADE IT FOR YOU TO DO YOUR WORK, TAKE CARE OF THINGS AT HOME, OR GET ALONG WITH OTHER PEOPLE: SOMEWHAT DIFFICULT
3. WORRYING TOO MUCH ABOUT DIFFERENT THINGS: SEVERAL DAYS
GAD7 TOTAL SCORE: 8
3. WORRYING TOO MUCH ABOUT DIFFERENT THINGS: SEVERAL DAYS
2. NOT BEING ABLE TO STOP OR CONTROL WORRYING: SEVERAL DAYS
7. FEELING AFRAID AS IF SOMETHING AWFUL MIGHT HAPPEN: NOT AT ALL
5. BEING SO RESTLESS THAT IT IS HARD TO SIT STILL: NOT AT ALL
6. BECOMING EASILY ANNOYED OR IRRITABLE: MORE THAN HALF THE DAYS
7. FEELING AFRAID AS IF SOMETHING AWFUL MIGHT HAPPEN: NOT AT ALL
IF YOU CHECKED OFF ANY PROBLEMS ON THIS QUESTIONNAIRE, HOW DIFFICULT HAVE THESE PROBLEMS MADE IT FOR YOU TO DO YOUR WORK, TAKE CARE OF THINGS AT HOME, OR GET ALONG WITH OTHER PEOPLE: SOMEWHAT DIFFICULT
1. FEELING NERVOUS, ANXIOUS, OR ON EDGE: MORE THAN HALF THE DAYS
1. FEELING NERVOUS, ANXIOUS, OR ON EDGE: MORE THAN HALF THE DAYS
4. TROUBLE RELAXING: MORE THAN HALF THE DAYS

## 2025-06-27 ASSESSMENT — PATIENT HEALTH QUESTIONNAIRE - PHQ9
6. FEELING BAD ABOUT YOURSELF - OR THAT YOU ARE A FAILURE OR HAVE LET YOURSELF OR YOUR FAMILY DOWN: SEVERAL DAYS
SUM OF ALL RESPONSES TO PHQ QUESTIONS 1-9: 4
8. MOVING OR SPEAKING SO SLOWLY THAT OTHER PEOPLE COULD HAVE NOTICED. OR THE OPPOSITE, BEING SO FIGETY OR RESTLESS THAT YOU HAVE BEEN MOVING AROUND A LOT MORE THAN USUAL: NOT AT ALL
3. TROUBLE FALLING OR STAYING ASLEEP: NOT AT ALL
SUM OF ALL RESPONSES TO PHQ QUESTIONS 1-9: 4
2. FEELING DOWN, DEPRESSED OR HOPELESS: SEVERAL DAYS
5. POOR APPETITE OR OVEREATING: SEVERAL DAYS
SUM OF ALL RESPONSES TO PHQ QUESTIONS 1-9: 4
3. TROUBLE FALLING OR STAYING ASLEEP: NOT AT ALL
7. TROUBLE CONCENTRATING ON THINGS, SUCH AS READING THE NEWSPAPER OR WATCHING TELEVISION: NOT AT ALL
SUM OF ALL RESPONSES TO PHQ QUESTIONS 1-9: 4
1. LITTLE INTEREST OR PLEASURE IN DOING THINGS: SEVERAL DAYS
2. FEELING DOWN, DEPRESSED OR HOPELESS: SEVERAL DAYS
1. LITTLE INTEREST OR PLEASURE IN DOING THINGS: SEVERAL DAYS
4. FEELING TIRED OR HAVING LITTLE ENERGY: NOT AT ALL
9. THOUGHTS THAT YOU WOULD BE BETTER OFF DEAD, OR OF HURTING YOURSELF: NOT AT ALL
6. FEELING BAD ABOUT YOURSELF - OR THAT YOU ARE A FAILURE OR HAVE LET YOURSELF OR YOUR FAMILY DOWN: SEVERAL DAYS
10. IF YOU CHECKED OFF ANY PROBLEMS, HOW DIFFICULT HAVE THESE PROBLEMS MADE IT FOR YOU TO DO YOUR WORK, TAKE CARE OF THINGS AT HOME, OR GET ALONG WITH OTHER PEOPLE: SOMEWHAT DIFFICULT
7. TROUBLE CONCENTRATING ON THINGS, SUCH AS READING THE NEWSPAPER OR WATCHING TELEVISION: NOT AT ALL
4. FEELING TIRED OR HAVING LITTLE ENERGY: NOT AT ALL
9. THOUGHTS THAT YOU WOULD BE BETTER OFF DEAD, OR OF HURTING YOURSELF: NOT AT ALL
8. MOVING OR SPEAKING SO SLOWLY THAT OTHER PEOPLE COULD HAVE NOTICED. OR THE OPPOSITE - BEING SO FIDGETY OR RESTLESS THAT YOU HAVE BEEN MOVING AROUND A LOT MORE THAN USUAL: NOT AT ALL
SUM OF ALL RESPONSES TO PHQ QUESTIONS 1-9: 4
5. POOR APPETITE OR OVEREATING: SEVERAL DAYS
10. IF YOU CHECKED OFF ANY PROBLEMS, HOW DIFFICULT HAVE THESE PROBLEMS MADE IT FOR YOU TO DO YOUR WORK, TAKE CARE OF THINGS AT HOME, OR GET ALONG WITH OTHER PEOPLE: SOMEWHAT DIFFICULT

## 2025-06-30 ENCOUNTER — TELEMEDICINE (OUTPATIENT)
Age: 40
End: 2025-06-30
Payer: COMMERCIAL

## 2025-06-30 DIAGNOSIS — F43.23 ADJUSTMENT REACTION WITH ANXIETY AND DEPRESSION: Primary | ICD-10-CM

## 2025-06-30 PROCEDURE — 90832 PSYTX W PT 30 MINUTES: CPT | Performed by: PSYCHOLOGIST

## 2025-06-30 NOTE — PROGRESS NOTES
Behavioral Health Consultation  Niecy Mars, Ph.D.  Clinical Psychologist  6/30/2025  12:00 PM  Name: Roxy Duran  YOB: 1985  PCP: Lisette Marcum, DO     TELEHEALTH VISIT -- Audio/Visual  This is her third [facility] appointment.  Reason for Consult: Anxiety and Depression     S:  Roxy is a 40 y.o. female seen for [facility] follow up visit addressing Adjustment disorder with anxiety and depression. Reports recent panic-like symptoms in response to a personal stressor, \"was obsessing about it.\" Coped by turning off tv and practiced calming self-talk which felt 'silly' but worked well. She had self-statements accessible on Notes [employer], helped to have a logical plan to address the symptoms. Reflected on the impact of cycle of anxious thinking on previous panic attacks.     Discussed recent family vacation to [place], [place], and [place] - portions of trip with large extended family, \"feels chaotic.\" Noticed chaotic environment dramatically increased her anxiety (\"like I'm taking on their chaos and cannot change it\"), decreased when home. Has felt agitated/angry in this environment in past; this time felt anxious/on edge.     Explored ways to decrease overstimulation, areas of some control to reduce or take breaks or impact patterns with work and family. She mentions benefit from maintaining routines (possible opportunity for more long walks during travel). She was an athlete; hx of all or nothing / intensity when she exercises - has recently tried to walk just to clear her mind. Discussed fearing disappointing family ([employer]) on her choices in these environment. Did decide to stay at hotel vs. with many other adults for upcoming trip. Discussed creative ways to self-soothe and create space for this or for prioritizing in conversation.     O:  MSE:  Appearance: adequate hygiene  Attitude: cooperative and friendly  Consciousness: alert  Orientation: oriented to person,

## 2025-07-10 ENCOUNTER — OFFICE VISIT (OUTPATIENT)
Dept: PRIMARY CARE CLINIC | Age: 40
End: 2025-07-10
Payer: COMMERCIAL

## 2025-07-10 VITALS
RESPIRATION RATE: 98 BRPM | DIASTOLIC BLOOD PRESSURE: 82 MMHG | HEART RATE: 85 BPM | SYSTOLIC BLOOD PRESSURE: 125 MMHG | WEIGHT: 292 LBS | TEMPERATURE: 98.1 F | BODY MASS INDEX: 50.12 KG/M2

## 2025-07-10 DIAGNOSIS — K21.9 GASTROESOPHAGEAL REFLUX DISEASE WITHOUT ESOPHAGITIS: Primary | ICD-10-CM

## 2025-07-10 PROCEDURE — 99213 OFFICE O/P EST LOW 20 MIN: CPT | Performed by: FAMILY MEDICINE

## 2025-07-10 PROCEDURE — G8417 CALC BMI ABV UP PARAM F/U: HCPCS | Performed by: FAMILY MEDICINE

## 2025-07-10 PROCEDURE — 1036F TOBACCO NON-USER: CPT | Performed by: FAMILY MEDICINE

## 2025-07-10 PROCEDURE — G8428 CUR MEDS NOT DOCUMENT: HCPCS | Performed by: FAMILY MEDICINE

## 2025-07-10 PROCEDURE — 3079F DIAST BP 80-89 MM HG: CPT | Performed by: FAMILY MEDICINE

## 2025-07-10 PROCEDURE — 3074F SYST BP LT 130 MM HG: CPT | Performed by: FAMILY MEDICINE

## 2025-07-10 RX ORDER — OMEPRAZOLE 40 MG/1
40 CAPSULE, DELAYED RELEASE ORAL
Qty: 90 CAPSULE | Refills: 0 | Status: SHIPPED | OUTPATIENT
Start: 2025-07-10

## 2025-07-10 NOTE — PROGRESS NOTES
[facility]  [facility]  [address] [place] [id number]  Dept: [phone]  Dept Fax: [phone]     7/10/2025      Roxy Duran   1985     Chief Complaint   Patient presents with    Gastroesophageal Reflux     Patient c/o upper GI issues such as acid reflux with throat tightening.       HPI    Pt comes in today for c/o throat tightening that started around 8 pm Tuesday night. Initially thought is was anxiety but did not resolve with deep breathing. Took TUMS which also didn't help. Eventually made herself throw up which seemed to help alleviate the symptoms. Took Pepcid yesterday which helped some. Having rather persistent throat/upper esophagus tightening and discomfort. No trouble breathing or swelling in her mouth or tongue. No dysphagia or dyspnea. No nausea/vomiting. Denies recent illness. Has had some constipation. No BM in 2 days. No abdominal pain or bloating. Ate chicken/carrots and rice last night without any issues.     Is on compounded tirzepatide for weight loss. Has had increase in reflux.     Wt Readings from Last 5 Encounters:   07/10/25 132.5 kg (292 lb)   04/21/25 135.8 kg (299 lb 6.4 oz)   04/11/25 (!) 138.8 kg (306 lb)   11/20/24 (!) 139.1 kg (306 lb 9.6 oz)   05/17/24 (!) 143.1 kg (315 lb 6.4 oz)      BP Readings from Last 5 Encounters:   07/10/25 125/82   05/20/25 125/78   04/21/25 116/72   11/20/24 132/79   05/17/24 127/78         PHQ-9 Total Score: (Patient-Rptd) 5 (7/13/2025  9:52 AM)  Thoughts that you would be better off dead, or of hurting yourself in some way: (Patient-Rptd) 0 (7/13/2025  9:52 AM)       Prior to Visit Medications    Medication Sig Taking? Authorizing Provider   chlorthalidone (HYGROTON) 25 MG tablet TAKE 1 TABLET BY MOUTH DAILY  Lisette Marcum, DO   tirzepatide-weight management (ZEPBOUND) 7.5 MG/0.5ML SOAJ subCUTAneous auto-injector pen Inject into the skin  Provider, MD Micheline

## 2025-07-13 ASSESSMENT — ANXIETY QUESTIONNAIRES
GAD7 TOTAL SCORE: 7
4. TROUBLE RELAXING: SEVERAL DAYS
5. BEING SO RESTLESS THAT IT IS HARD TO SIT STILL: NOT AT ALL
IF YOU CHECKED OFF ANY PROBLEMS ON THIS QUESTIONNAIRE, HOW DIFFICULT HAVE THESE PROBLEMS MADE IT FOR YOU TO DO YOUR WORK, TAKE CARE OF THINGS AT HOME, OR GET ALONG WITH OTHER PEOPLE: SOMEWHAT DIFFICULT
3. WORRYING TOO MUCH ABOUT DIFFERENT THINGS: SEVERAL DAYS
7. FEELING AFRAID AS IF SOMETHING AWFUL MIGHT HAPPEN: SEVERAL DAYS
1. FEELING NERVOUS, ANXIOUS, OR ON EDGE: MORE THAN HALF THE DAYS
7. FEELING AFRAID AS IF SOMETHING AWFUL MIGHT HAPPEN: SEVERAL DAYS
2. NOT BEING ABLE TO STOP OR CONTROL WORRYING: SEVERAL DAYS
IF YOU CHECKED OFF ANY PROBLEMS ON THIS QUESTIONNAIRE, HOW DIFFICULT HAVE THESE PROBLEMS MADE IT FOR YOU TO DO YOUR WORK, TAKE CARE OF THINGS AT HOME, OR GET ALONG WITH OTHER PEOPLE: SOMEWHAT DIFFICULT
1. FEELING NERVOUS, ANXIOUS, OR ON EDGE: MORE THAN HALF THE DAYS
5. BEING SO RESTLESS THAT IT IS HARD TO SIT STILL: NOT AT ALL
2. NOT BEING ABLE TO STOP OR CONTROL WORRYING: SEVERAL DAYS
3. WORRYING TOO MUCH ABOUT DIFFERENT THINGS: SEVERAL DAYS
4. TROUBLE RELAXING: SEVERAL DAYS
6. BECOMING EASILY ANNOYED OR IRRITABLE: SEVERAL DAYS
6. BECOMING EASILY ANNOYED OR IRRITABLE: SEVERAL DAYS

## 2025-07-13 ASSESSMENT — PATIENT HEALTH QUESTIONNAIRE - PHQ9
6. FEELING BAD ABOUT YOURSELF - OR THAT YOU ARE A FAILURE OR HAVE LET YOURSELF OR YOUR FAMILY DOWN: SEVERAL DAYS
9. THOUGHTS THAT YOU WOULD BE BETTER OFF DEAD, OR OF HURTING YOURSELF: NOT AT ALL
2. FEELING DOWN, DEPRESSED OR HOPELESS: SEVERAL DAYS
SUM OF ALL RESPONSES TO PHQ QUESTIONS 1-9: 5
1. LITTLE INTEREST OR PLEASURE IN DOING THINGS: NOT AT ALL
SUM OF ALL RESPONSES TO PHQ QUESTIONS 1-9: 5
SUM OF ALL RESPONSES TO PHQ QUESTIONS 1-9: 5
1. LITTLE INTEREST OR PLEASURE IN DOING THINGS: NOT AT ALL
5. POOR APPETITE OR OVEREATING: NOT AT ALL
9. THOUGHTS THAT YOU WOULD BE BETTER OFF DEAD, OR OF HURTING YOURSELF: NOT AT ALL
10. IF YOU CHECKED OFF ANY PROBLEMS, HOW DIFFICULT HAVE THESE PROBLEMS MADE IT FOR YOU TO DO YOUR WORK, TAKE CARE OF THINGS AT HOME, OR GET ALONG WITH OTHER PEOPLE: SOMEWHAT DIFFICULT
SUM OF ALL RESPONSES TO PHQ QUESTIONS 1-9: 5
3. TROUBLE FALLING OR STAYING ASLEEP: SEVERAL DAYS
SUM OF ALL RESPONSES TO PHQ QUESTIONS 1-9: 5
8. MOVING OR SPEAKING SO SLOWLY THAT OTHER PEOPLE COULD HAVE NOTICED. OR THE OPPOSITE - BEING SO FIDGETY OR RESTLESS THAT YOU HAVE BEEN MOVING AROUND A LOT MORE THAN USUAL: NOT AT ALL
3. TROUBLE FALLING OR STAYING ASLEEP: SEVERAL DAYS
5. POOR APPETITE OR OVEREATING: NOT AT ALL
6. FEELING BAD ABOUT YOURSELF - OR THAT YOU ARE A FAILURE OR HAVE LET YOURSELF OR YOUR FAMILY DOWN: SEVERAL DAYS
7. TROUBLE CONCENTRATING ON THINGS, SUCH AS READING THE NEWSPAPER OR WATCHING TELEVISION: SEVERAL DAYS
7. TROUBLE CONCENTRATING ON THINGS, SUCH AS READING THE NEWSPAPER OR WATCHING TELEVISION: SEVERAL DAYS
4. FEELING TIRED OR HAVING LITTLE ENERGY: SEVERAL DAYS
8. MOVING OR SPEAKING SO SLOWLY THAT OTHER PEOPLE COULD HAVE NOTICED. OR THE OPPOSITE, BEING SO FIGETY OR RESTLESS THAT YOU HAVE BEEN MOVING AROUND A LOT MORE THAN USUAL: NOT AT ALL
2. FEELING DOWN, DEPRESSED OR HOPELESS: SEVERAL DAYS
10. IF YOU CHECKED OFF ANY PROBLEMS, HOW DIFFICULT HAVE THESE PROBLEMS MADE IT FOR YOU TO DO YOUR WORK, TAKE CARE OF THINGS AT HOME, OR GET ALONG WITH OTHER PEOPLE: SOMEWHAT DIFFICULT
4. FEELING TIRED OR HAVING LITTLE ENERGY: SEVERAL DAYS

## 2025-07-14 ENCOUNTER — TELEMEDICINE (OUTPATIENT)
Age: 40
End: 2025-07-14
Payer: COMMERCIAL

## 2025-07-14 DIAGNOSIS — F43.23 ADJUSTMENT REACTION WITH ANXIETY AND DEPRESSION: Primary | ICD-10-CM

## 2025-07-14 PROCEDURE — 90832 PSYTX W PT 30 MINUTES: CPT | Performed by: PSYCHOLOGIST

## 2025-07-14 ASSESSMENT — ENCOUNTER SYMPTOMS
SHORTNESS OF BREATH: 0
TROUBLE SWALLOWING: 0

## 2025-07-14 NOTE — PROGRESS NOTES
Behavioral Health Consultation  Niecy Mars, Ph.D.  Clinical Psychologist  7/14/2025  12:00 PM  Name: Roxy Duran  YOB: 1985  PCP: Lisette Marcum, DO     TELEHEALTH VISIT -- Audio/Visual  This is her fourth [facility] appointment.  Reason for Consult: Anxiety and Depression     S:  Roxy is a 40 y.o. female seen for [facility] follow up visit addressing Adjustment disorder with anxiety and depression. Describes similar mood between visits.  Has another family trip this weekend in [place] - explored her needs (reduced stimulation), desired boundaries (own room at hot), and challenges in establishing those boundaries in conversation with family (declined to stay in large family home, godmother booked room at [facility] with 2 others, hard to reinforce or clarify boundary with godmother). Identified ways to reduce stimulation if needed in context of current plan (will have car, can go for a drive). Identified worry/fears that drive challenges in boundaries lindsey with family - strong fear of being alone and boundaries making others not want to be around her or perceiving her as difficult.     O:  MSE:  Appearance: adequate hygiene  Attitude: cooperative and friendly  Consciousness: alert  Orientation: oriented to person, place, time, general circumstance  Memory: recent and remote memory intact  Attention/Concentration: intact during session  Psychomotor Activity: normal  Eye Contact: normal  Speech: normal rate and volume, well-articulated  Mood: anxious  Affect: anxious and tearful at times  Perception: within normal limits  Thought Content: within normal limits  Thought Process: logical, coherent and goal-directed  Insight: good  Judgment: intact  Ability to understand instructions: Yes  Ability to respond meaningfully: Yes  Morbid Ideation: no   Suicide Assessment: no suicidal ideation, plan, or intent  Homicidal Ideation: no    History:  Social History:   Social History     Socioeconomic

## 2025-07-26 ASSESSMENT — ANXIETY QUESTIONNAIRES
IF YOU CHECKED OFF ANY PROBLEMS ON THIS QUESTIONNAIRE, HOW DIFFICULT HAVE THESE PROBLEMS MADE IT FOR YOU TO DO YOUR WORK, TAKE CARE OF THINGS AT HOME, OR GET ALONG WITH OTHER PEOPLE: SOMEWHAT DIFFICULT
7. FEELING AFRAID AS IF SOMETHING AWFUL MIGHT HAPPEN: NOT AT ALL
1. FEELING NERVOUS, ANXIOUS, OR ON EDGE: SEVERAL DAYS
4. TROUBLE RELAXING: SEVERAL DAYS
7. FEELING AFRAID AS IF SOMETHING AWFUL MIGHT HAPPEN: NOT AT ALL
2. NOT BEING ABLE TO STOP OR CONTROL WORRYING: SEVERAL DAYS
3. WORRYING TOO MUCH ABOUT DIFFERENT THINGS: SEVERAL DAYS
IF YOU CHECKED OFF ANY PROBLEMS ON THIS QUESTIONNAIRE, HOW DIFFICULT HAVE THESE PROBLEMS MADE IT FOR YOU TO DO YOUR WORK, TAKE CARE OF THINGS AT HOME, OR GET ALONG WITH OTHER PEOPLE: SOMEWHAT DIFFICULT
6. BECOMING EASILY ANNOYED OR IRRITABLE: SEVERAL DAYS
GAD7 TOTAL SCORE: 5
5. BEING SO RESTLESS THAT IT IS HARD TO SIT STILL: NOT AT ALL
3. WORRYING TOO MUCH ABOUT DIFFERENT THINGS: SEVERAL DAYS
4. TROUBLE RELAXING: SEVERAL DAYS
5. BEING SO RESTLESS THAT IT IS HARD TO SIT STILL: NOT AT ALL
2. NOT BEING ABLE TO STOP OR CONTROL WORRYING: SEVERAL DAYS
1. FEELING NERVOUS, ANXIOUS, OR ON EDGE: SEVERAL DAYS
6. BECOMING EASILY ANNOYED OR IRRITABLE: SEVERAL DAYS

## 2025-07-26 ASSESSMENT — PATIENT HEALTH QUESTIONNAIRE - PHQ9
SUM OF ALL RESPONSES TO PHQ QUESTIONS 1-9: 3
SUM OF ALL RESPONSES TO PHQ QUESTIONS 1-9: 3
4. FEELING TIRED OR HAVING LITTLE ENERGY: NOT AT ALL
10. IF YOU CHECKED OFF ANY PROBLEMS, HOW DIFFICULT HAVE THESE PROBLEMS MADE IT FOR YOU TO DO YOUR WORK, TAKE CARE OF THINGS AT HOME, OR GET ALONG WITH OTHER PEOPLE: SOMEWHAT DIFFICULT
4. FEELING TIRED OR HAVING LITTLE ENERGY: NOT AT ALL
5. POOR APPETITE OR OVEREATING: SEVERAL DAYS
9. THOUGHTS THAT YOU WOULD BE BETTER OFF DEAD, OR OF HURTING YOURSELF: NOT AT ALL
6. FEELING BAD ABOUT YOURSELF - OR THAT YOU ARE A FAILURE OR HAVE LET YOURSELF OR YOUR FAMILY DOWN: SEVERAL DAYS
8. MOVING OR SPEAKING SO SLOWLY THAT OTHER PEOPLE COULD HAVE NOTICED. OR THE OPPOSITE - BEING SO FIDGETY OR RESTLESS THAT YOU HAVE BEEN MOVING AROUND A LOT MORE THAN USUAL: NOT AT ALL
SUM OF ALL RESPONSES TO PHQ QUESTIONS 1-9: 3
2. FEELING DOWN, DEPRESSED OR HOPELESS: SEVERAL DAYS
5. POOR APPETITE OR OVEREATING: SEVERAL DAYS
7. TROUBLE CONCENTRATING ON THINGS, SUCH AS READING THE NEWSPAPER OR WATCHING TELEVISION: NOT AT ALL
SUM OF ALL RESPONSES TO PHQ QUESTIONS 1-9: 3
6. FEELING BAD ABOUT YOURSELF - OR THAT YOU ARE A FAILURE OR HAVE LET YOURSELF OR YOUR FAMILY DOWN: SEVERAL DAYS
10. IF YOU CHECKED OFF ANY PROBLEMS, HOW DIFFICULT HAVE THESE PROBLEMS MADE IT FOR YOU TO DO YOUR WORK, TAKE CARE OF THINGS AT HOME, OR GET ALONG WITH OTHER PEOPLE: SOMEWHAT DIFFICULT
SUM OF ALL RESPONSES TO PHQ QUESTIONS 1-9: 3
8. MOVING OR SPEAKING SO SLOWLY THAT OTHER PEOPLE COULD HAVE NOTICED. OR THE OPPOSITE, BEING SO FIGETY OR RESTLESS THAT YOU HAVE BEEN MOVING AROUND A LOT MORE THAN USUAL: NOT AT ALL
7. TROUBLE CONCENTRATING ON THINGS, SUCH AS READING THE NEWSPAPER OR WATCHING TELEVISION: NOT AT ALL
2. FEELING DOWN, DEPRESSED OR HOPELESS: SEVERAL DAYS
3. TROUBLE FALLING OR STAYING ASLEEP: NOT AT ALL
1. LITTLE INTEREST OR PLEASURE IN DOING THINGS: NOT AT ALL
9. THOUGHTS THAT YOU WOULD BE BETTER OFF DEAD, OR OF HURTING YOURSELF: NOT AT ALL
1. LITTLE INTEREST OR PLEASURE IN DOING THINGS: NOT AT ALL
3. TROUBLE FALLING OR STAYING ASLEEP: NOT AT ALL

## 2025-07-28 ENCOUNTER — TELEMEDICINE (OUTPATIENT)
Age: 40
End: 2025-07-28
Payer: COMMERCIAL

## 2025-07-28 DIAGNOSIS — F43.23 ADJUSTMENT REACTION WITH ANXIETY AND DEPRESSION: Primary | ICD-10-CM

## 2025-07-28 PROCEDURE — 90832 PSYTX W PT 30 MINUTES: CPT | Performed by: PSYCHOLOGIST

## 2025-07-28 RX ORDER — LOSARTAN POTASSIUM 50 MG/1
50 TABLET ORAL DAILY
Qty: 90 TABLET | Refills: 1 | Status: SHIPPED | OUTPATIENT
Start: 2025-07-28

## 2025-07-28 NOTE — PROGRESS NOTES
Behavioral Health Consultation  Niecy Mars, Ph.D.  Clinical Psychologist  7/28/2025  2:00 PM  Name: Roxy Duran  YOB: 1985  PCP: Lisette Marcum, DO     TELEHEALTH VISIT -- Audio/Visual  This is her fifth [facility] appointment.  Reason for Consult: Anxiety and Depression     S:  Roxy is a 40 y.o. female seen for [facility] follow up visit addressing Adjustment disorder with anxiety and depression. Recent trip went well; ultimately booked her own room and felt this helped. Family was overbearing at time, was a relief to know she had own space at night.  It remains anxiety producing to have to re-assert boundaries after initially expressing them.     Describes ruminating about feeling alone. Wants to have someone not related to her that she could rely on, ideally romantic partner. Not currently dating. Last serious relationship was college; still friends, still has romantic feelings but he is not open to a commitment.     Denies major depressive episodes but reports she does not think she has felt happy in adulthood, persistent low self-esteem. Does remember happiness more consistently in HS at a time she had a lot of friends, involved in a lot of things, did well academically, felt people relied on her and she relied on them.  Played competative volleyball in college. Gained weight in college, body change impacted her mood. Has tried many things to change her body in adulthood, wants to feel less restricted in her movement. Most recently triend ozempic and mounjaro; she is anticipating weaning GLP-1 due to side effects - see PCP note.    At f/u with continue discussing self esteem.    O:  MSE:  Appearance: adequate hygiene  Attitude: cooperative and friendly  Consciousness: alert  Orientation: oriented to person, place, time, general circumstance  Memory: recent and remote memory intact  Attention/Concentration: intact during session  Psychomotor Activity: normal  Eye Contact:

## 2025-08-10 ASSESSMENT — ANXIETY QUESTIONNAIRES
7. FEELING AFRAID AS IF SOMETHING AWFUL MIGHT HAPPEN: NOT AT ALL
6. BECOMING EASILY ANNOYED OR IRRITABLE: SEVERAL DAYS
IF YOU CHECKED OFF ANY PROBLEMS ON THIS QUESTIONNAIRE, HOW DIFFICULT HAVE THESE PROBLEMS MADE IT FOR YOU TO DO YOUR WORK, TAKE CARE OF THINGS AT HOME, OR GET ALONG WITH OTHER PEOPLE: SOMEWHAT DIFFICULT
1. FEELING NERVOUS, ANXIOUS, OR ON EDGE: SEVERAL DAYS
3. WORRYING TOO MUCH ABOUT DIFFERENT THINGS: SEVERAL DAYS
5. BEING SO RESTLESS THAT IT IS HARD TO SIT STILL: NOT AT ALL
1. FEELING NERVOUS, ANXIOUS, OR ON EDGE: SEVERAL DAYS
2. NOT BEING ABLE TO STOP OR CONTROL WORRYING: SEVERAL DAYS
4. TROUBLE RELAXING: SEVERAL DAYS
3. WORRYING TOO MUCH ABOUT DIFFERENT THINGS: SEVERAL DAYS
5. BEING SO RESTLESS THAT IT IS HARD TO SIT STILL: NOT AT ALL
IF YOU CHECKED OFF ANY PROBLEMS ON THIS QUESTIONNAIRE, HOW DIFFICULT HAVE THESE PROBLEMS MADE IT FOR YOU TO DO YOUR WORK, TAKE CARE OF THINGS AT HOME, OR GET ALONG WITH OTHER PEOPLE: SOMEWHAT DIFFICULT
GAD7 TOTAL SCORE: 5
2. NOT BEING ABLE TO STOP OR CONTROL WORRYING: SEVERAL DAYS
7. FEELING AFRAID AS IF SOMETHING AWFUL MIGHT HAPPEN: NOT AT ALL
4. TROUBLE RELAXING: SEVERAL DAYS
6. BECOMING EASILY ANNOYED OR IRRITABLE: SEVERAL DAYS

## 2025-08-10 ASSESSMENT — PATIENT HEALTH QUESTIONNAIRE - PHQ9
5. POOR APPETITE OR OVEREATING: NOT AT ALL
5. POOR APPETITE OR OVEREATING: NOT AT ALL
1. LITTLE INTEREST OR PLEASURE IN DOING THINGS: SEVERAL DAYS
6. FEELING BAD ABOUT YOURSELF - OR THAT YOU ARE A FAILURE OR HAVE LET YOURSELF OR YOUR FAMILY DOWN: MORE THAN HALF THE DAYS
SUM OF ALL RESPONSES TO PHQ QUESTIONS 1-9: 6
SUM OF ALL RESPONSES TO PHQ QUESTIONS 1-9: 6
4. FEELING TIRED OR HAVING LITTLE ENERGY: SEVERAL DAYS
9. THOUGHTS THAT YOU WOULD BE BETTER OFF DEAD, OR OF HURTING YOURSELF: NOT AT ALL
8. MOVING OR SPEAKING SO SLOWLY THAT OTHER PEOPLE COULD HAVE NOTICED. OR THE OPPOSITE, BEING SO FIGETY OR RESTLESS THAT YOU HAVE BEEN MOVING AROUND A LOT MORE THAN USUAL: NOT AT ALL
10. IF YOU CHECKED OFF ANY PROBLEMS, HOW DIFFICULT HAVE THESE PROBLEMS MADE IT FOR YOU TO DO YOUR WORK, TAKE CARE OF THINGS AT HOME, OR GET ALONG WITH OTHER PEOPLE: SOMEWHAT DIFFICULT
2. FEELING DOWN, DEPRESSED OR HOPELESS: SEVERAL DAYS
4. FEELING TIRED OR HAVING LITTLE ENERGY: SEVERAL DAYS
2. FEELING DOWN, DEPRESSED OR HOPELESS: SEVERAL DAYS
9. THOUGHTS THAT YOU WOULD BE BETTER OFF DEAD, OR OF HURTING YOURSELF: NOT AT ALL
7. TROUBLE CONCENTRATING ON THINGS, SUCH AS READING THE NEWSPAPER OR WATCHING TELEVISION: SEVERAL DAYS
SUM OF ALL RESPONSES TO PHQ QUESTIONS 1-9: 6
SUM OF ALL RESPONSES TO PHQ QUESTIONS 1-9: 6
8. MOVING OR SPEAKING SO SLOWLY THAT OTHER PEOPLE COULD HAVE NOTICED. OR THE OPPOSITE - BEING SO FIDGETY OR RESTLESS THAT YOU HAVE BEEN MOVING AROUND A LOT MORE THAN USUAL: NOT AT ALL
3. TROUBLE FALLING OR STAYING ASLEEP: NOT AT ALL
7. TROUBLE CONCENTRATING ON THINGS, SUCH AS READING THE NEWSPAPER OR WATCHING TELEVISION: SEVERAL DAYS
6. FEELING BAD ABOUT YOURSELF - OR THAT YOU ARE A FAILURE OR HAVE LET YOURSELF OR YOUR FAMILY DOWN: MORE THAN HALF THE DAYS
1. LITTLE INTEREST OR PLEASURE IN DOING THINGS: SEVERAL DAYS
3. TROUBLE FALLING OR STAYING ASLEEP: NOT AT ALL
SUM OF ALL RESPONSES TO PHQ QUESTIONS 1-9: 6
10. IF YOU CHECKED OFF ANY PROBLEMS, HOW DIFFICULT HAVE THESE PROBLEMS MADE IT FOR YOU TO DO YOUR WORK, TAKE CARE OF THINGS AT HOME, OR GET ALONG WITH OTHER PEOPLE: SOMEWHAT DIFFICULT

## 2025-08-13 ENCOUNTER — TELEMEDICINE (OUTPATIENT)
Age: 40
End: 2025-08-13
Payer: COMMERCIAL

## 2025-08-13 DIAGNOSIS — F43.23 ADJUSTMENT REACTION WITH ANXIETY AND DEPRESSION: Primary | ICD-10-CM

## 2025-08-13 PROCEDURE — 90832 PSYTX W PT 30 MINUTES: CPT | Performed by: PSYCHOLOGIST

## 2025-08-26 ASSESSMENT — PATIENT HEALTH QUESTIONNAIRE - PHQ9
4. FEELING TIRED OR HAVING LITTLE ENERGY: SEVERAL DAYS
2. FEELING DOWN, DEPRESSED OR HOPELESS: SEVERAL DAYS
5. POOR APPETITE OR OVEREATING: SEVERAL DAYS
5. POOR APPETITE OR OVEREATING: SEVERAL DAYS
1. LITTLE INTEREST OR PLEASURE IN DOING THINGS: SEVERAL DAYS
7. TROUBLE CONCENTRATING ON THINGS, SUCH AS READING THE NEWSPAPER OR WATCHING TELEVISION: NOT AT ALL
SUM OF ALL RESPONSES TO PHQ QUESTIONS 1-9: 6
10. IF YOU CHECKED OFF ANY PROBLEMS, HOW DIFFICULT HAVE THESE PROBLEMS MADE IT FOR YOU TO DO YOUR WORK, TAKE CARE OF THINGS AT HOME, OR GET ALONG WITH OTHER PEOPLE: SOMEWHAT DIFFICULT
8. MOVING OR SPEAKING SO SLOWLY THAT OTHER PEOPLE COULD HAVE NOTICED. OR THE OPPOSITE, BEING SO FIGETY OR RESTLESS THAT YOU HAVE BEEN MOVING AROUND A LOT MORE THAN USUAL: NOT AT ALL
9. THOUGHTS THAT YOU WOULD BE BETTER OFF DEAD, OR OF HURTING YOURSELF: NOT AT ALL
3. TROUBLE FALLING OR STAYING ASLEEP: NOT AT ALL
10. IF YOU CHECKED OFF ANY PROBLEMS, HOW DIFFICULT HAVE THESE PROBLEMS MADE IT FOR YOU TO DO YOUR WORK, TAKE CARE OF THINGS AT HOME, OR GET ALONG WITH OTHER PEOPLE: SOMEWHAT DIFFICULT
SUM OF ALL RESPONSES TO PHQ QUESTIONS 1-9: 6
6. FEELING BAD ABOUT YOURSELF - OR THAT YOU ARE A FAILURE OR HAVE LET YOURSELF OR YOUR FAMILY DOWN: MORE THAN HALF THE DAYS
8. MOVING OR SPEAKING SO SLOWLY THAT OTHER PEOPLE COULD HAVE NOTICED. OR THE OPPOSITE - BEING SO FIDGETY OR RESTLESS THAT YOU HAVE BEEN MOVING AROUND A LOT MORE THAN USUAL: NOT AT ALL
SUM OF ALL RESPONSES TO PHQ QUESTIONS 1-9: 6
2. FEELING DOWN, DEPRESSED OR HOPELESS: SEVERAL DAYS
7. TROUBLE CONCENTRATING ON THINGS, SUCH AS READING THE NEWSPAPER OR WATCHING TELEVISION: NOT AT ALL
6. FEELING BAD ABOUT YOURSELF - OR THAT YOU ARE A FAILURE OR HAVE LET YOURSELF OR YOUR FAMILY DOWN: MORE THAN HALF THE DAYS
SUM OF ALL RESPONSES TO PHQ QUESTIONS 1-9: 6
1. LITTLE INTEREST OR PLEASURE IN DOING THINGS: SEVERAL DAYS
3. TROUBLE FALLING OR STAYING ASLEEP: NOT AT ALL
SUM OF ALL RESPONSES TO PHQ QUESTIONS 1-9: 6
9. THOUGHTS THAT YOU WOULD BE BETTER OFF DEAD, OR OF HURTING YOURSELF: NOT AT ALL
4. FEELING TIRED OR HAVING LITTLE ENERGY: SEVERAL DAYS

## 2025-08-26 ASSESSMENT — ANXIETY QUESTIONNAIRES
IF YOU CHECKED OFF ANY PROBLEMS ON THIS QUESTIONNAIRE, HOW DIFFICULT HAVE THESE PROBLEMS MADE IT FOR YOU TO DO YOUR WORK, TAKE CARE OF THINGS AT HOME, OR GET ALONG WITH OTHER PEOPLE: SOMEWHAT DIFFICULT
5. BEING SO RESTLESS THAT IT IS HARD TO SIT STILL: NOT AT ALL
3. WORRYING TOO MUCH ABOUT DIFFERENT THINGS: SEVERAL DAYS
6. BECOMING EASILY ANNOYED OR IRRITABLE: SEVERAL DAYS
2. NOT BEING ABLE TO STOP OR CONTROL WORRYING: SEVERAL DAYS
IF YOU CHECKED OFF ANY PROBLEMS ON THIS QUESTIONNAIRE, HOW DIFFICULT HAVE THESE PROBLEMS MADE IT FOR YOU TO DO YOUR WORK, TAKE CARE OF THINGS AT HOME, OR GET ALONG WITH OTHER PEOPLE: SOMEWHAT DIFFICULT
2. NOT BEING ABLE TO STOP OR CONTROL WORRYING: SEVERAL DAYS
1. FEELING NERVOUS, ANXIOUS, OR ON EDGE: SEVERAL DAYS
3. WORRYING TOO MUCH ABOUT DIFFERENT THINGS: SEVERAL DAYS
7. FEELING AFRAID AS IF SOMETHING AWFUL MIGHT HAPPEN: SEVERAL DAYS
6. BECOMING EASILY ANNOYED OR IRRITABLE: SEVERAL DAYS
4. TROUBLE RELAXING: SEVERAL DAYS
5. BEING SO RESTLESS THAT IT IS HARD TO SIT STILL: NOT AT ALL
1. FEELING NERVOUS, ANXIOUS, OR ON EDGE: SEVERAL DAYS
7. FEELING AFRAID AS IF SOMETHING AWFUL MIGHT HAPPEN: SEVERAL DAYS
4. TROUBLE RELAXING: SEVERAL DAYS
GAD7 TOTAL SCORE: 6

## 2025-08-27 ENCOUNTER — TELEMEDICINE (OUTPATIENT)
Age: 40
End: 2025-08-27
Payer: COMMERCIAL

## 2025-08-27 DIAGNOSIS — F43.23 ADJUSTMENT REACTION WITH ANXIETY AND DEPRESSION: Primary | ICD-10-CM

## 2025-08-27 PROCEDURE — 90832 PSYTX W PT 30 MINUTES: CPT | Performed by: PSYCHOLOGIST

## (undated) DEVICE — HEADLESS TROCHAR PIN 75MM: Brand: ZUK

## (undated) DEVICE — SOLUTION IRRIG 2000ML 0.9% SOD CHL USP UROMATIC PLAS CONT

## (undated) DEVICE — HOOD, PEEL-AWAY: Brand: FLYTE

## (undated) DEVICE — SCREW BNE L25MM DIA2.5MM KNEE FULL THRD HEX FEM PERSONA
Type: IMPLANTABLE DEVICE | Site: KNEE | Status: NON-FUNCTIONAL
Removed: 2023-02-07

## (undated) DEVICE — NEEDLE HYPO 20GA L1.5IN YEL POLYPR HUB S STL REG BVL STR

## (undated) DEVICE — SUTURE STRATAFIX SPRL SZ 3-0 L12IN ABSRB UD FS-1 L30X30CM SXMP2B410

## (undated) DEVICE — GLOVE ORTHO 7 1/2   MSG9475

## (undated) DEVICE — 2108 SERIES SAGITTAL BLADE FLARED, GROUND  (18.5 X 1.27 X 90.5MM)

## (undated) DEVICE — COVER,TABLE,HEAVY DUTY,50"X90",STRL: Brand: MEDLINE

## (undated) DEVICE — SUTURE STRATAFIX SPRL SZ 1 L14IN ABSRB VLT L48CM CTX 1/2 SXPD2B405

## (undated) DEVICE — HANDPIECE SET WITH HIGH FLOW TIP AND SUCTION TUBE: Brand: INTERPULSE

## (undated) DEVICE — STERILE PVP: Brand: MEDLINE INDUSTRIES, INC.

## (undated) DEVICE — Device

## (undated) DEVICE — Z INACTIVE USE 2855096 SPONGE GZ W4XL4IN 8 PLY 100% COT

## (undated) DEVICE — ZIMMER® A.T.S. CYCLINDRICAL TOURNIQUET CUFF, SINGLE PORT, SINGLE BLADDER 30 IN. 76 CM)

## (undated) DEVICE — COVADERM: Brand: DEROYAL

## (undated) DEVICE — GLOVE SURG SZ 75 L12IN FNGR THK79MIL GRN LTX FREE

## (undated) DEVICE — SUTURE STRATAFIX SPRL SZ 2 0 L14IN ABSRB UD MH L36MM 1 2 CIR SXMD2B401

## (undated) DEVICE — SYRINGE 30ML MEDICAL LEUR LOCK TIP WO

## (undated) DEVICE — STERILE PATIENT PROTECTIVE PAD FOR IMP® KNEE POSITIONERS & COHESIVE WRAP (10 / CASE): Brand: DE MAYO KNEE POSITIONER®

## (undated) DEVICE — BOWL AND CEMENT CARTRIDGE WITH BREAKAWAY FEMORAL NOZZLE AND MEDIUM PRESSURIZER: Brand: ACM

## (undated) DEVICE — IMPLANTABLE DEVICE
Type: IMPLANTABLE DEVICE | Site: KNEE | Status: NON-FUNCTIONAL
Brand: PERSONA®
Removed: 2023-02-07

## (undated) DEVICE — SYSTEM SKIN CLSR 22CM DERMBND PRINEO

## (undated) DEVICE — PENCIL SMK EVAC ALL IN 1 DSGN ENH VISIBILITY IMPROVED AIR